# Patient Record
Sex: MALE | Race: WHITE | NOT HISPANIC OR LATINO | Employment: UNEMPLOYED | URBAN - NONMETROPOLITAN AREA
[De-identification: names, ages, dates, MRNs, and addresses within clinical notes are randomized per-mention and may not be internally consistent; named-entity substitution may affect disease eponyms.]

---

## 2020-09-08 ENCOUNTER — HOSPITAL ENCOUNTER (OUTPATIENT)
Dept: BONE DENSITY | Facility: HOSPITAL | Age: 70
Discharge: HOME | End: 2020-09-08
Admitting: INTERNAL MEDICINE

## 2020-09-08 DIAGNOSIS — Z91.89: ICD-10-CM

## 2020-09-08 PROCEDURE — 77080 DXA BONE DENSITY AXIAL: CPT

## 2020-09-08 PROCEDURE — 77080 DXA BONE DENSITY AXIAL: CPT | Performed by: RADIOLOGY

## 2023-02-21 ENCOUNTER — HOSPITAL ENCOUNTER (OUTPATIENT)
Dept: CT IMAGING | Facility: HOSPITAL | Age: 73
Discharge: HOME | End: 2023-02-21
Admitting: PHYSICIAN ASSISTANT
Payer: MEDICARE

## 2023-02-21 ENCOUNTER — TRANSCRIBE ORDERS (OUTPATIENT)
Dept: ADMINISTRATIVE | Facility: HOSPITAL | Age: 73
End: 2023-02-21
Payer: MEDICARE

## 2023-02-21 DIAGNOSIS — R31.0 GROSS HEMATURIA: ICD-10-CM

## 2023-02-21 DIAGNOSIS — R31.0 GROSS HEMATURIA: Primary | ICD-10-CM

## 2023-02-21 PROCEDURE — 74176 CT ABD & PELVIS W/O CONTRAST: CPT

## 2023-02-21 PROCEDURE — 74176 CT ABD & PELVIS W/O CONTRAST: CPT | Performed by: RADIOLOGY

## 2023-03-09 ENCOUNTER — HOSPITAL ENCOUNTER (OUTPATIENT)
Dept: GENERAL RADIOLOGY | Facility: HOSPITAL | Age: 73
Discharge: HOME | End: 2023-03-09
Payer: MEDICARE

## 2023-03-09 ENCOUNTER — OFFICE VISIT (OUTPATIENT)
Dept: UROLOGY | Facility: CLINIC | Age: 73
End: 2023-03-09
Payer: MEDICARE

## 2023-03-09 VITALS — HEIGHT: 66 IN | WEIGHT: 200 LBS | BODY MASS INDEX: 32.14 KG/M2

## 2023-03-09 DIAGNOSIS — N20.0 NEPHROLITHIASIS: ICD-10-CM

## 2023-03-09 DIAGNOSIS — N21.0 BLADDER CALCULI: ICD-10-CM

## 2023-03-09 DIAGNOSIS — K59.03 DRUG-INDUCED CONSTIPATION: ICD-10-CM

## 2023-03-09 DIAGNOSIS — R35.0 FREQUENCY OF URINATION: Primary | ICD-10-CM

## 2023-03-09 PROBLEM — E11.9 TYPE 2 DIABETES MELLITUS WITHOUT COMPLICATION: Status: ACTIVE | Noted: 2023-03-09

## 2023-03-09 PROBLEM — K59.09 CHRONIC CONSTIPATION: Status: ACTIVE | Noted: 2023-03-09

## 2023-03-09 PROBLEM — K80.20 CHOLELITHIASIS WITHOUT OBSTRUCTION: Status: ACTIVE | Noted: 2023-03-09

## 2023-03-09 PROBLEM — N40.0 BENIGN PROSTATIC HYPERPLASIA: Status: ACTIVE | Noted: 2023-03-09

## 2023-03-09 LAB
BILIRUB BLD-MCNC: NEGATIVE MG/DL
CLARITY, POC: CLEAR
COLOR UR: YELLOW
EXPIRATION DATE: ABNORMAL
GLUCOSE UR STRIP-MCNC: ABNORMAL MG/DL
KETONES UR QL: NEGATIVE
LEUKOCYTE EST, POC: NEGATIVE
Lab: ABNORMAL
NITRITE UR-MCNC: NEGATIVE MG/ML
PH UR: 5 [PH] (ref 5–8)
PROT UR STRIP-MCNC: ABNORMAL MG/DL
RBC # UR STRIP: NEGATIVE /UL
SP GR UR: 1.02 (ref 1–1.03)
UROBILINOGEN UR QL: NORMAL

## 2023-03-09 PROCEDURE — 1160F RVW MEDS BY RX/DR IN RCRD: CPT

## 2023-03-09 PROCEDURE — 81003 URINALYSIS AUTO W/O SCOPE: CPT

## 2023-03-09 PROCEDURE — 1159F MED LIST DOCD IN RCRD: CPT

## 2023-03-09 PROCEDURE — 74018 RADEX ABDOMEN 1 VIEW: CPT | Performed by: RADIOLOGY

## 2023-03-09 PROCEDURE — 99203 OFFICE O/P NEW LOW 30 MIN: CPT

## 2023-03-09 PROCEDURE — 74018 RADEX ABDOMEN 1 VIEW: CPT

## 2023-03-09 RX ORDER — MELATONIN
1 DAILY
COMMUNITY
Start: 2022-12-30

## 2023-03-09 RX ORDER — LISINOPRIL 20 MG/1
20 TABLET ORAL
COMMUNITY
Start: 2023-01-20

## 2023-03-09 RX ORDER — ROSUVASTATIN CALCIUM 10 MG/1
1 TABLET, COATED ORAL DAILY
COMMUNITY
Start: 2023-01-18

## 2023-03-09 RX ORDER — CARVEDILOL 3.12 MG/1
3.12 TABLET ORAL 2 TIMES DAILY WITH MEALS
COMMUNITY
Start: 2023-01-18 | End: 2023-03-09 | Stop reason: ALTCHOICE

## 2023-03-09 RX ORDER — LACTULOSE 10 G/15ML
20 SOLUTION ORAL 2 TIMES DAILY PRN
Qty: 237 ML | Refills: 1 | Status: SHIPPED | OUTPATIENT
Start: 2023-03-09

## 2023-03-09 RX ORDER — BUDESONIDE AND FORMOTEROL FUMARATE DIHYDRATE 160; 4.5 UG/1; UG/1
2 AEROSOL RESPIRATORY (INHALATION) 2 TIMES DAILY
COMMUNITY
Start: 2023-01-20

## 2023-03-09 RX ORDER — LATANOPROST 50 UG/ML
SOLUTION/ DROPS OPHTHALMIC
COMMUNITY
Start: 2023-01-20

## 2023-03-09 RX ORDER — MONTELUKAST SODIUM 10 MG/1
TABLET ORAL
COMMUNITY
Start: 2023-01-04

## 2023-03-09 RX ORDER — TAMSULOSIN HYDROCHLORIDE 0.4 MG/1
1 CAPSULE ORAL DAILY
Qty: 90 CAPSULE | Refills: 3 | Status: SHIPPED | OUTPATIENT
Start: 2023-03-09

## 2023-03-09 RX ORDER — MELOXICAM 15 MG/1
TABLET ORAL
COMMUNITY
Start: 2022-12-30

## 2023-03-09 RX ORDER — TIRZEPATIDE 2.5 MG/.5ML
INJECTION, SOLUTION SUBCUTANEOUS
COMMUNITY
Start: 2022-12-30

## 2023-03-09 RX ORDER — OMEPRAZOLE 20 MG/1
CAPSULE, DELAYED RELEASE ORAL
COMMUNITY
Start: 2022-12-05

## 2023-03-09 RX ORDER — LEVOCETIRIZINE DIHYDROCHLORIDE 5 MG/1
5 TABLET, FILM COATED ORAL
COMMUNITY
Start: 2022-12-27

## 2023-03-09 RX ORDER — TAMSULOSIN HYDROCHLORIDE 0.4 MG/1
CAPSULE ORAL
COMMUNITY
Start: 2023-02-21 | End: 2023-03-09 | Stop reason: SDUPTHER

## 2023-03-09 RX ORDER — ASPIRIN 81 MG/1
1 TABLET, COATED ORAL DAILY
COMMUNITY
Start: 2023-01-18

## 2023-03-09 NOTE — PROGRESS NOTES
"Chief Complaint:    Chief Complaint   Patient presents with   • Blood in Urine       Vital Signs:   Ht 167.6 cm (65.98\")   Wt 90.7 kg (200 lb)   BMI 32.30 kg/m²   Body mass index is 32.3 kg/m².      HPI:  Jamin Julio is a 72 y.o. male who presents today for initial evaluation     History of Present Illness  Mr. Julio presents the clinic today for initial evaluation of gross hematuria.  Patient has a past medical history significant for type 2 diabetes mellitus, BPH, hypertension, and nephrolithiasis.  States that he has passed multiple stones previously.  He has never had to have surgery for stone removal. He was really seen by primary care provider who had completed a culture that showed no significant growth.  They then ordered a CT scan that showed A Significant Amount of calcification throughout the urinary tract system.  The patient had multiple nonobstructing stones in the left renal collecting system with the largest measuring roughly 9 mm.  He had multiple nonobstructing stones in the right kidney with the largest measuring roughly 2 to 3 mm.  He did have a proximal right ureteral stone measuring 6 mm that was causing no significant obstruction at the time of the study.  He also had 2 left-sided bladder calculi measuring roughly 4 to 5 mm apiece. Denies any significant pain at this time.  I repeated KUB today in office for further evaluation of his right ureteral stone however it was not well identified on x-ray today.  He has significant right-sided stool burden with calculus identified in the left kidney and lower left pelvis.  UA today showed no blood or signs infection.  He does currently take Flomax daily for BPH.      Past Medical History:  Past Medical History:   Diagnosis Date   • Diabetes mellitus (HCC)    • Hypertension    • Kidney stone        Current Meds:  Current Outpatient Medications   Medication Sig Dispense Refill   • metoprolol tartrate (LOPRESSOR) 25 MG tablet Take  by mouth.     • " tamsulosin (FLOMAX) 0.4 MG capsule 24 hr capsule Take 1 capsule by mouth Daily. 90 capsule 3   • Aspirin Low Dose 81 MG EC tablet Take 1 tablet by mouth Daily.     • budesonide-formoterol (SYMBICORT) 160-4.5 MCG/ACT inhaler Inhale 2 puffs 2 (Two) Times a Day. Rinse mouth after each use     • cholecalciferol (VITAMIN D3) 25 MCG (1000 UT) tablet Take 1 tablet by mouth Daily.     • lactulose (CHRONULAC) 10 GM/15ML solution Take 30 mL by mouth 2 (Two) Times a Day As Needed (for constipation). 237 mL 1   • latanoprost (XALATAN) 0.005 % ophthalmic solution INSTILL 1 DROP IN BOTH EYES EVERY DAY IN THE EVENING     • levocetirizine (XYZAL) 5 MG tablet Take 1 tablet by mouth every night at bedtime.     • lisinopril (PRINIVIL,ZESTRIL) 20 MG tablet Take 1 tablet by mouth every night at bedtime.     • meloxicam (MOBIC) 15 MG tablet      • metFORMIN (GLUCOPHAGE) 500 MG tablet take 1 tablet by mouth twice daily with the morning and evening meal     • montelukast (SINGULAIR) 10 MG tablet      • Mounjaro 2.5 MG/0.5ML solution pen-injector      • omeprazole (priLOSEC) 20 MG capsule TAKE 1 CAPSULE BY MOUTH EVERY DAY 30 MINUTES TO 1 HOUR BEFORE A MEAL     • rosuvastatin (CRESTOR) 10 MG tablet Take 1 tablet by mouth Daily.       No current facility-administered medications for this visit.        Allergies:   No Known Allergies     Past Surgical History:  History reviewed. No pertinent surgical history.    Social History:  Social History     Socioeconomic History   • Marital status:    Tobacco Use   • Smoking status: Never     Passive exposure: Never   • Smokeless tobacco: Current     Types: Snuff   Vaping Use   • Vaping Use: Never used   Substance and Sexual Activity   • Alcohol use: Never   • Drug use: Never   • Sexual activity: Defer       Family History:  Family History   Problem Relation Age of Onset   • Prostate cancer Father        Review of Systems:  Review of Systems   Constitutional: Negative for fatigue, fever and  unexpected weight change.   Respiratory: Negative for chest tightness and shortness of breath.    Cardiovascular: Negative for chest pain.   Gastrointestinal: Negative for abdominal pain, constipation, diarrhea, nausea and vomiting.   Genitourinary: Positive for flank pain and hematuria. Negative for decreased urine volume, difficulty urinating, dysuria, enuresis, frequency, genital sores, penile discharge, penile pain, penile swelling, scrotal swelling, testicular pain and urgency.   Musculoskeletal: Positive for back pain.   Skin: Negative.  Negative for rash.   Psychiatric/Behavioral: Negative for confusion and suicidal ideas.       Physical Exam:  Physical Exam  Constitutional:       General: He is not in acute distress.     Appearance: Normal appearance.   HENT:      Head: Normocephalic and atraumatic.      Nose: Nose normal.      Mouth/Throat:      Mouth: Mucous membranes are moist.   Eyes:      Conjunctiva/sclera: Conjunctivae normal.   Cardiovascular:      Rate and Rhythm: Normal rate and regular rhythm.      Pulses: Normal pulses.      Heart sounds: Normal heart sounds.   Pulmonary:      Effort: Pulmonary effort is normal.      Breath sounds: Normal breath sounds.   Abdominal:      General: Bowel sounds are normal.      Palpations: Abdomen is soft.      Tenderness: There is no right CVA tenderness or left CVA tenderness.   Musculoskeletal:         General: Normal range of motion.      Cervical back: Normal range of motion.   Skin:     General: Skin is warm.   Neurological:      General: No focal deficit present.      Mental Status: He is alert and oriented to person, place, and time.   Psychiatric:         Mood and Affect: Mood normal.         Behavior: Behavior normal.         Thought Content: Thought content normal.         Judgment: Judgment normal.         IPSS Questionnaire (AUA-7):  IPSS Questionnaire (AUA-7):                  IPSS Questionnaire (AUA-7):  Over the past month…    1)  Incomplete  Emptying  How often have you had a sensation of not emptying your bladder?  0 - Not at all   2)  Frequency  How often have you had to urinate less than every two hours? 0 - Not at all   3)  Intermittency  How often have you found you stopped and started again several times when you urinated?  0 - Not at all   4) Urgency  How often have you found it difficult to postpone urination?  0 - Not at all   5) Weak Stream  How often have you had a weak urinary stream?  2 - Less than half the time   6) Straining  How often have you had to push or strain to begin urination?  0 - Not at all   7) Nocturia  How many times did you typically get up at night to urinate?  1 - 1 time   Total Score:  3   The International Prostate Symptom Score (IPSS) is used to screen, diagnose, track symptoms of benign prostatic hyperplasia (BPH).    0-7 pts (Mild Symptoms)  / 8-19 pts (Moderate) / 20-35 (Severe)    Quality of life due to urinary symptoms:  If you were to spend the rest of your life with your urinary condition the way it is now, how would you feel about that? 1-Pleased   Urine Leakage (Incontinence) 0-No Leakage       Recent Image (CT and/or KUB):   CT Abdomen and Pelvis: No results found for this or any previous visit.     CT Stone Protocol: Results for orders placed during the hospital encounter of 02/21/23    CT Abdomen Pelvis Stone Protocol    Narrative  EXAM:  CT Abdomen and Pelvis Without Intravenous Contrast    EXAM DATE:  2/21/2023 11:23 AM    CLINICAL HISTORY:  R31.0; R31.0-Gross hematuria    TECHNIQUE:  Axial computed tomography images of the abdomen and pelvis without  intravenous contrast.  Sagittal and coronal reformatted images were  created and reviewed.  This CT exam was performed using one or more of  the following dose reduction techniques:  automated exposure control,  adjustment of the mA and/or kV according to patient size, and/or use of  iterative reconstruction technique.    COMPARISON:  No relevant prior  studies available.    FINDINGS:  Lung bases:  Lung bases are clear.  Heart:  Cardiomegaly.    ABDOMEN:  Liver:  Unremarkable.  Gallbladder and bile ducts:  Marked distention of the gallbladder with  layering stones. Correlate with ultrasound.  No ductal dilation.  Pancreas:  Unremarkable.  No ductal dilation.  Spleen:  Unremarkable.  No splenomegaly.  Adrenals:  Unremarkable.  No mass.  Kidneys and ureters:  Bilateral nonobstructing left greater than right  kidney stones.  6 mm right UPJ stone causing no significant  hydronephrosis.  No additional ureteral stones identified. No ureteral  dilatation.  Stomach and bowel:  Moderate constipation is noted. No bowel  obstruction identified.  Sigmoid diverticulosis without evidence of  acute diverticulitis.  Duodenal diverticulum incidentally noted in a  characteristic location.    PELVIS:  Appendix:  Normal appendix.  Bladder:  Layering stones within the urinary bladder.  Reproductive:  Prostate enlargement with Central calcifications.    ABDOMEN and PELVIS:  Intraperitoneal space:  No pneumoperitoneum identified.  No ascites or  abscess.  Bones/joints:  Degenerative changes lumbar spine.  No acute bony  findings.  No dislocation.  Soft tissues:  Small FAT only containing umbilical hernia.  Vasculature:  Unremarkable.  No abdominal aortic aneurysm.  Lymph nodes:  Unremarkable.  No enlarged lymph nodes.    Impression  1.  Bilateral nonobstructing kidney stones, left greater than right.  2.  6 mm right UPJ stone causing no significant hydronephrosis.  3.  Layering stones within the urinary bladder dependent portion.  4.  Prostate enlargement.  5.  Marked gallbladder distention with layering stones. Correlate with  ultrasound and clinical history.  6.  Moderate constipation. Diverticulosis. No diverticulitis.  7.  Other incidental and nonacute findings detailed above.    This report was finalized on 2/21/2023 12:13 PM by Dr. Eric Stokes MD.     KUB: No results found for  this or any previous visit.       Labs:  Brief Urine Lab Results  (Last result in the past 365 days)      Color   Clarity   Blood   Leuk Est   Nitrite   Protein   CREAT   Urine HCG        03/09/23 1359 Yellow   Clear   Negative   Negative   Negative   3+               Office Visit on 03/09/2023   Component Date Value Ref Range Status   • Color 03/09/2023 Yellow  Yellow, Straw, Dark Yellow, Jaqueline Final   • Clarity, UA 03/09/2023 Clear  Clear Final   • Specific Gravity  03/09/2023 1.025  1.005 - 1.030 Final   • pH, Urine 03/09/2023 5.0  5.0 - 8.0 Final   • Leukocytes 03/09/2023 Negative  Negative Final   • Nitrite, UA 03/09/2023 Negative  Negative Final   • Protein, POC 03/09/2023 3+ (A)  Negative mg/dL Final   • Glucose, UA 03/09/2023 2+ (A)  Negative mg/dL Final   • Ketones, UA 03/09/2023 Negative  Negative Final   • Urobilinogen, UA 03/09/2023 Normal  Normal, 0.2 E.U./dL Final   • Bilirubin 03/09/2023 Negative  Negative Final   • Blood, UA 03/09/2023 Negative  Negative Final   • Lot Number 03/09/2023 n   Final   • Expiration Date 03/09/2023 n   Final        Procedure: None  Procedures     I have reviewed and agree with the above PMH, PSH, FMH, social history, medications, allergies, and labs.     Assessment/Plan:   Problem List Items Addressed This Visit        Genitourinary and Reproductive     Nephrolithiasis    Relevant Medications    tamsulosin (FLOMAX) 0.4 MG capsule 24 hr capsule    Other Relevant Orders    XR abdomen kub    Bladder calculi    Relevant Medications    tamsulosin (FLOMAX) 0.4 MG capsule 24 hr capsule   Other Visit Diagnoses     Frequency of urination    -  Primary    Relevant Orders    POC Urinalysis Dipstick, Automated (Completed)    Drug-induced constipation        Relevant Medications    lactulose (CHRONULAC) 10 GM/15ML solution          Health Maintenance:   Health Maintenance Due   Topic Date Due   • URINE MICROALBUMIN  Never done   • COLORECTAL CANCER SCREENING  Never done   • COVID-19  Vaccine (1) Never done   • Pneumococcal Vaccine 65+ (1 - PCV) Never done   • ZOSTER VACCINE (1 of 2) Never done   • TDAP/TD VACCINES (2 - Tdap) 10/12/2009   • HEPATITIS C SCREENING  Never done   • DIABETIC FOOT EXAM  Never done        Smoking Counseling: Never smoked.  Current user smokes tobacco.  Counseling at [facility] this time.    Urine Incontinence: Patient reports that he is not currently experiencing any symptoms of urinary incontinence.    Patient was given instructions and counseling regarding his condition or for health maintenance advice. Please see specific information pulled into the AVS if appropriate.    Patient Education:   Renal calculus - It was discussed with the patient the presence of multiple nonobstructing intrarenal calculi and one 6 mm right proximal ureteral stone with no obstruction.  He also had 2 bladder calculi. We discussed the various therapeutic options available including percutaneous nephrostolithotomy, ureteroscopy and extracorporeal shockwave  lithotripsy.  We discussed the risks of lithotripsy including the passage of stones leading to a 3% chance of Steinstrasse or a large string of stones in the distal ureter. In this incidence the patient was informed that a ureteroscopy is indicated for obstructing fragments.  Patient was informed of an extremely rare incidence of renal hematoma and the significance of this.  Patient was educated on percutaneous nephrostolithotomy and its use as well as the risks and benefits such as the need for postoperative hospitalization, and the risk of damage to the kidney and the remote risk of a nephrectomy.  We also discussed the use of ureteroscopy in the upper tracts and its decreased success rate to completely remove the stones likely causing stent placement leading to an additional procedure for removal.  We discussed the absolute relative indicators for intervention including the presence of sepsis and uncontrollable pain leading to need for  urgent intervention.  We discussed placement of a stent if indicated and the management of the stent as well.  Given that the patient has no current symptoms at this time and UA shows no blood I am recommending to increase Flomax to twice daily.  Discussed the risks and side effects of this.  Advised patient to decrease to once daily if he begins to experience lightheadedness, dizziness, blurry vision, shortness of breath, chest pain, or syncope.  Also educated patient to increase water intake to 2 to 3 L/day.  Advised patient to decrease the intake of food and drinks that contribute to stone formation such as but not limited to pop, tea, dairy products, red meat, or supplements.  Chronic constipation -on KUB today patient has significant constipation noted with internal renal and bladder calculi.  This time recommending a trial of lactulose as needed twice daily for constipation.  Advised him to increase water intake to 3 L/day.  Advised patient to take a stool softener over-the-counter daily.  I would like to follow-up with him in 1 month for reevaluation of symptoms and discussion of further care.  Patient verbalized understanding agrees.    Visit Diagnoses:    ICD-10-CM ICD-9-CM   1. Frequency of urination  R35.0 788.41   2. Nephrolithiasis  N20.0 592.0   3. Bladder calculi  N21.0 594.1   4. Drug-induced constipation  K59.03 564.09     E980.5       Meds Ordered During Visit:  New Medications Ordered This Visit   Medications   • lactulose (CHRONULAC) 10 GM/15ML solution     Sig: Take 30 mL by mouth 2 (Two) Times a Day As Needed (for constipation).     Dispense:  237 mL     Refill:  1   • tamsulosin (FLOMAX) 0.4 MG capsule 24 hr capsule     Sig: Take 1 capsule by mouth Daily.     Dispense:  90 capsule     Refill:  3       Follow Up Appointment: 1 month  No follow-ups on file.      This document has been electronically signed by Ag Bautista PA-C   March 9, 2023 18:55 EST    Part of this note may be an  electronic transcription/translation of spoken language to printed text using the Dragon Dictation System.

## 2023-04-13 ENCOUNTER — OFFICE VISIT (OUTPATIENT)
Dept: UROLOGY | Facility: CLINIC | Age: 73
End: 2023-04-13
Payer: MEDICARE

## 2023-04-13 VITALS
WEIGHT: 200 LBS | DIASTOLIC BLOOD PRESSURE: 83 MMHG | HEART RATE: 84 BPM | HEIGHT: 66 IN | SYSTOLIC BLOOD PRESSURE: 168 MMHG | BODY MASS INDEX: 32.14 KG/M2

## 2023-04-13 DIAGNOSIS — N40.1 BENIGN PROSTATIC HYPERPLASIA WITH URINARY FREQUENCY: ICD-10-CM

## 2023-04-13 DIAGNOSIS — K59.09 CHRONIC CONSTIPATION: Primary | ICD-10-CM

## 2023-04-13 DIAGNOSIS — R97.20 ELEVATED PROSTATE SPECIFIC ANTIGEN (PSA): ICD-10-CM

## 2023-04-13 DIAGNOSIS — R35.0 BENIGN PROSTATIC HYPERPLASIA WITH URINARY FREQUENCY: ICD-10-CM

## 2023-04-13 PROCEDURE — 51798 US URINE CAPACITY MEASURE: CPT

## 2023-04-13 PROCEDURE — 1159F MED LIST DOCD IN RCRD: CPT

## 2023-04-13 PROCEDURE — 99214 OFFICE O/P EST MOD 30 MIN: CPT

## 2023-04-13 PROCEDURE — 1160F RVW MEDS BY RX/DR IN RCRD: CPT

## 2023-04-13 NOTE — PROGRESS NOTES
"Chief Complaint:    Chief Complaint   Patient presents with   • Frequency of urination     1 month follow up       Vital Signs:   /83 (BP Location: Right arm, Patient Position: Sitting)   Pulse 84   Ht 167.6 cm (65.98\")   Wt 90.7 kg (200 lb)   BMI 32.30 kg/m²   Body mass index is 32.3 kg/m².      HPI:  Jamin Julio is a 72 y.o. male who presents today for follow up    History of Present Illness  Mr. Julio presents to the clinic for follow-up for nephrolithiasis and urinary frequency.  Patient has had a CT and KUB completed recently that showed significant stone burden throughout his renal collecting system.  He had also stones located inside the bladder.  Patient has passed multiple stones since last office visit.  States that he is doing much better.  I started the patient on trial of Flomax and Myrbetriq 25 mg once nightly.  On bladder scan today he only has roughly 55 mL in his bladder.  I did do a bladder ultrasound as well and I did not see any evidence of bladder stones.  He has tried over-the-counter medications for constipation with no improvement in symptoms.  He also had a recent PSA completed on 3/31/2023 by his primary care provider that came back high at roughly 5.5.  He denies any history of elevated PSA in the past.  Still has some frequency and urgency.  He gets up only roughly 2 times throughout the night.  Digital rectal exam reveals slight enlargement of prostate with no other abnormalities.      Past Medical History:  Past Medical History:   Diagnosis Date   • Diabetes mellitus    • Hypertension    • Kidney stone        Current Meds:  Current Outpatient Medications   Medication Sig Dispense Refill   • Aspirin Low Dose 81 MG EC tablet Take 1 tablet by mouth Daily.     • budesonide-formoterol (SYMBICORT) 160-4.5 MCG/ACT inhaler Inhale 2 puffs 2 (Two) Times a Day. Rinse mouth after each use     • cholecalciferol (VITAMIN D3) 25 MCG (1000 UT) tablet Take 1 tablet by mouth Daily.     • " lactulose (CHRONULAC) 10 GM/15ML solution Take 30 mL by mouth 2 (Two) Times a Day As Needed (for constipation). 237 mL 1   • latanoprost (XALATAN) 0.005 % ophthalmic solution INSTILL 1 DROP IN BOTH EYES EVERY DAY IN THE EVENING     • levocetirizine (XYZAL) 5 MG tablet Take 1 tablet by mouth every night at bedtime.     • lisinopril (PRINIVIL,ZESTRIL) 20 MG tablet Take 1 tablet by mouth every night at bedtime.     • meloxicam (MOBIC) 15 MG tablet      • metFORMIN (GLUCOPHAGE) 500 MG tablet take 1 tablet by mouth twice daily with the morning and evening meal     • metoprolol tartrate (LOPRESSOR) 25 MG tablet Take  by mouth.     • montelukast (SINGULAIR) 10 MG tablet      • Mounjaro 2.5 MG/0.5ML solution pen-injector      • omeprazole (priLOSEC) 20 MG capsule TAKE 1 CAPSULE BY MOUTH EVERY DAY 30 MINUTES TO 1 HOUR BEFORE A MEAL     • rosuvastatin (CRESTOR) 10 MG tablet Take 1 tablet by mouth Daily.     • tamsulosin (FLOMAX) 0.4 MG capsule 24 hr capsule Take 1 capsule by mouth Daily. 90 capsule 3   • linaclotide (LINZESS) 145 MCG capsule capsule Take 1 capsule by mouth Every Morning Before Breakfast. 30 capsule 2   • Mirabegron ER (Myrbetriq) 25 MG tablet sustained-release 24 hour 24 hr tablet Take 1 tablet by mouth Daily. 30 tablet 1     No current facility-administered medications for this visit.        Allergies:   No Known Allergies     Past Surgical History:  History reviewed. No pertinent surgical history.    Social History:  Social History     Socioeconomic History   • Marital status:    Tobacco Use   • Smoking status: Never     Passive exposure: Never   • Smokeless tobacco: Current     Types: Snuff   Vaping Use   • Vaping Use: Never used   Substance and Sexual Activity   • Alcohol use: Never   • Drug use: Never   • Sexual activity: Defer       Family History:  Family History   Problem Relation Age of Onset   • Prostate cancer Father        Review of Systems:  Review of Systems   Constitutional: Negative for  fatigue, fever and unexpected weight change.   Respiratory: Negative for chest tightness and shortness of breath.    Cardiovascular: Negative for chest pain.   Gastrointestinal: Negative for abdominal pain, constipation, diarrhea, nausea and vomiting.   Genitourinary: Positive for flank pain, frequency and urgency. Negative for decreased urine volume, difficulty urinating, dysuria, enuresis, genital sores, hematuria, penile discharge, penile pain, penile swelling, scrotal swelling and testicular pain.   Musculoskeletal: Positive for back pain.   Skin: Negative.  Negative for rash.   Psychiatric/Behavioral: Negative for confusion and suicidal ideas.       Physical Exam:  Physical Exam  Constitutional:       General: He is not in acute distress.     Appearance: Normal appearance.   HENT:      Head: Normocephalic and atraumatic.      Nose: Nose normal.      Mouth/Throat:      Mouth: Mucous membranes are moist.   Eyes:      Conjunctiva/sclera: Conjunctivae normal.   Cardiovascular:      Rate and Rhythm: Normal rate and regular rhythm.      Pulses: Normal pulses.      Heart sounds: Normal heart sounds.   Pulmonary:      Effort: Pulmonary effort is normal.      Breath sounds: Normal breath sounds.   Abdominal:      General: Bowel sounds are normal.      Palpations: Abdomen is soft.      Tenderness: There is no right CVA tenderness or left CVA tenderness.   Genitourinary:     Prostate: Normal.      Comments: Enlargement of prostate  Musculoskeletal:         General: Normal range of motion.      Cervical back: Normal range of motion.   Skin:     General: Skin is warm.   Neurological:      General: No focal deficit present.      Mental Status: He is alert and oriented to person, place, and time.   Psychiatric:         Mood and Affect: Mood normal.         Behavior: Behavior normal.         Thought Content: Thought content normal.         Judgment: Judgment normal.       Recent Image (CT and/or KUB):   CT Abdomen and Pelvis:  No results found for this or any previous visit.     CT Stone Protocol: Results for orders placed during the hospital encounter of 02/21/23    CT Abdomen Pelvis Stone Protocol    Narrative  EXAM:  CT Abdomen and Pelvis Without Intravenous Contrast    EXAM DATE:  2/21/2023 11:23 AM    CLINICAL HISTORY:  R31.0; R31.0-Gross hematuria    TECHNIQUE:  Axial computed tomography images of the abdomen and pelvis without  intravenous contrast.  Sagittal and coronal reformatted images were  created and reviewed.  This CT exam was performed using one or more of  the following dose reduction techniques:  automated exposure control,  adjustment of the mA and/or kV according to patient size, and/or use of  iterative reconstruction technique.    COMPARISON:  No relevant prior studies available.    FINDINGS:  Lung bases:  Lung bases are clear.  Heart:  Cardiomegaly.    ABDOMEN:  Liver:  Unremarkable.  Gallbladder and bile ducts:  Marked distention of the gallbladder with  layering stones. Correlate with ultrasound.  No ductal dilation.  Pancreas:  Unremarkable.  No ductal dilation.  Spleen:  Unremarkable.  No splenomegaly.  Adrenals:  Unremarkable.  No mass.  Kidneys and ureters:  Bilateral nonobstructing left greater than right  kidney stones.  6 mm right UPJ stone causing no significant  hydronephrosis.  No additional ureteral stones identified. No ureteral  dilatation.  Stomach and bowel:  Moderate constipation is noted. No bowel  obstruction identified.  Sigmoid diverticulosis without evidence of  acute diverticulitis.  Duodenal diverticulum incidentally noted in a  characteristic location.    PELVIS:  Appendix:  Normal appendix.  Bladder:  Layering stones within the urinary bladder.  Reproductive:  Prostate enlargement with Central calcifications.    ABDOMEN and PELVIS:  Intraperitoneal space:  No pneumoperitoneum identified.  No ascites or  abscess.  Bones/joints:  Degenerative changes lumbar spine.  No acute bony  findings.   No dislocation.  Soft tissues:  Small FAT only containing umbilical hernia.  Vasculature:  Unremarkable.  No abdominal aortic aneurysm.  Lymph nodes:  Unremarkable.  No enlarged lymph nodes.    Impression  1.  Bilateral nonobstructing kidney stones, left greater than right.  2.  6 mm right UPJ stone causing no significant hydronephrosis.  3.  Layering stones within the urinary bladder dependent portion.  4.  Prostate enlargement.  5.  Marked gallbladder distention with layering stones. Correlate with  ultrasound and clinical history.  6.  Moderate constipation. Diverticulosis. No diverticulitis.  7.  Other incidental and nonacute findings detailed above.    This report was finalized on 2/21/2023 12:13 PM by Dr. Eric Stokes MD.     KUB: Results for orders placed in visit on 03/09/23    XR abdomen kub    Narrative  EXAM:  XR Abdomen, 1 View    EXAM DATE:  3/9/2023 2:22 PM    CLINICAL HISTORY:  nephrolithiasis; N20.0-Calculus of kidney    TECHNIQUE:  Frontal supine view of the abdomen/pelvis.    COMPARISON:  CT performed 02/21/2023    FINDINGS:  GASTROINTESTINAL TRACT:  Abundant stool throughout the colon.  No  dilation.  ORGANS:  Known left renal calculi are again noted.  BONES/JOINTS:  Unremarkable.  VASCULATURE:  Pelvic calcifications, some of which are vascular and  others may be within the urinary bladder are again noted.    Impression  1.  Known left renal calculi are again noted.  2.  Pelvic calcifications, some of which are vascular and others may be  within the urinary bladder are again noted.  3.  Abundant stool throughout the colon.    This report was finalized on 3/10/2023 8:35 AM by Dr. Fernando Wise MD.       Labs:  Brief Urine Lab Results  (Last result in the past 365 days)      Color   Clarity   Blood   Leuk Est   Nitrite   Protein   CREAT   Urine HCG        03/09/23 1359 Yellow   Clear   Negative   Negative   Negative   3+               Office Visit on 03/09/2023   Component Date Value Ref Range  Status   • Color 03/09/2023 Yellow  Yellow, Straw, Dark Yellow, Jaqueline Final   • Clarity, UA 03/09/2023 Clear  Clear Final   • Specific Gravity  03/09/2023 1.025  1.005 - 1.030 Final   • pH, Urine 03/09/2023 5.0  5.0 - 8.0 Final   • Leukocytes 03/09/2023 Negative  Negative Final   • Nitrite, UA 03/09/2023 Negative  Negative Final   • Protein, POC 03/09/2023 3+ (A)  Negative mg/dL Final   • Glucose, UA 03/09/2023 2+ (A)  Negative mg/dL Final   • Ketones, UA 03/09/2023 Negative  Negative Final   • Urobilinogen, UA 03/09/2023 Normal  Normal, 0.2 E.U./dL Final   • Bilirubin 03/09/2023 Negative  Negative Final   • Blood, UA 03/09/2023 Negative  Negative Final   • Lot Number 03/09/2023 n   Final   • Expiration Date 03/09/2023 n   Final        Procedure: None  Procedures     I have reviewed and agree with the above PMH, PSH, FMH, social history, medications, allergies, and labs.      Assessment/Plan:   Problem List Items Addressed This Visit        Gastrointestinal Abdominal     Chronic constipation - Primary    Relevant Medications    linaclotide (LINZESS) 145 MCG capsule capsule       Genitourinary and Reproductive     Benign prostatic hyperplasia    Relevant Medications    Mirabegron ER (Myrbetriq) 25 MG tablet sustained-release 24 hour 24 hr tablet    Other Relevant Orders    PSA Total+% Free (Serial)    Elevated prostate specific antigen (PSA)    Relevant Orders    PSA Total+% Free (Serial)       Health Maintenance:   Health Maintenance Due   Topic Date Due   • URINE MICROALBUMIN  Never done   • COLORECTAL CANCER SCREENING  Never done   • COVID-19 Vaccine (1) Never done   • Pneumococcal Vaccine 65+ (1 - PCV) Never done   • ZOSTER VACCINE (1 of 2) Never done   • TDAP/TD VACCINES (2 - Tdap) 10/12/2009   • HEPATITIS C SCREENING  Never done   • DIABETIC FOOT EXAM  Never done        Smoking Counseling: Never smoked.  Current user smokeless tobacco.  Counseling given however not ready to quit at this time.    Urine  Incontinence: Patient reports that he is not currently experiencing any symptoms of urinary incontinence.    Patient was given instructions and counseling regarding his condition or for health maintenance advice. Please see specific information pulled into the AVS if appropriate.    Patient Education:   Chronic constipation -patient has a history of chronic constipation has tried over-the-counter medications with no improvement of symptoms.  I also sent in lactulose for him and he states that has not helped.  At this time I am recommending to start Linzess 145 mg once daily.  I will send this into his pharmacy to .  Advised him to continue to increase water intake to 2 to 3 L/day.  Nephrolithiasis/bladder stones -patient has multiple kidney stones throughout both renal collecting systems as well as bladder stones that was identified on CT and KUB.  Patient states he has passed several stones since last office visit.  On bladder scan today he has only roughly 55 mL in his bladder.  There are no stones identified on ultrasound of his bladder.  Recommending to continue Flomax.  Elevated PSA/Benign Prostate Hypertrophy (BPH): I discussed the pathophysiology of BPH and obstruction.  We discussed the static and dynamic effects of BPH as well as using 5 alpha reductase inhibitors versus alpha blockade.  We discussed the indications for transurethral surgery as well and/ or other therapeutic options available including all of the newer techniques.  Patient has had improvement in urinary symptoms with Flomax.  We will continue this once nightly.  I also like to continue Myrbetriq 25 mg nightly.  Advised the risk and benefits of this medication.  PSA testing -patient's most recent PSA taken at the end of March came back high at 5.5.  I recommended repeat PSA free and total in office. I discussed the pathophysiology of PSA testing indicating its use in the diagnosis and management of prostate cancer.  I discussed the  normal range being 0 to 4, but more appropriately being much closer to 0 to 2 in a normal male.  I discussed the fact that after a certain age it is against recommendation to use PSA testing especially in view of numerous comorbidities.  I discussed many of the things that can artificially raise PSA including put not limited to a recent infection, urinary tract infection, recent sexual intercourse, or even the type of movement such as manipulation of the prostate from riding a bicycle.  It was discussed that the most important use of PSA is the velocity measurement.  This refers to the change of PSA with time. I discussed that we look for greater than 20% rise over a year to help us make the prediction of prostate cancer.  I also discussed that in the case of prostate cancer indicating a radical prostatectomy, the PSA should be 0 and any rise indicates an early biochemical recurrence.  I will call patient with PSA results.  Otherwise I will see him back in 3 months for reevaluation.  Patient verbalized understanding agrees to plan of care.    Visit Diagnoses:    ICD-10-CM ICD-9-CM   1. Chronic constipation  K59.09 564.00   2. Elevated prostate specific antigen (PSA)  R97.20 790.93   3. Benign prostatic hyperplasia with urinary frequency  N40.1 600.01    R35.0 788.41       Meds Ordered During Visit:  New Medications Ordered This Visit   Medications   • linaclotide (LINZESS) 145 MCG capsule capsule     Sig: Take 1 capsule by mouth Every Morning Before Breakfast.     Dispense:  30 capsule     Refill:  2   • Mirabegron ER (Myrbetriq) 25 MG tablet sustained-release 24 hour 24 hr tablet     Sig: Take 1 tablet by mouth Daily.     Dispense:  30 tablet     Refill:  1       Follow Up Appointment: 3 months  No follow-ups on file.      This document has been electronically signed by Ag Bautista PA-C   April 14, 2023 09:50 EDT    Part of this note may be an electronic transcription/translation of spoken language to printed  text using the Dragon Dictation System.

## 2023-04-14 PROBLEM — R97.20 ELEVATED PROSTATE SPECIFIC ANTIGEN (PSA): Status: ACTIVE | Noted: 2023-04-14

## 2023-04-14 LAB
PSA FREE MFR SERPL: 11.3 %
PSA FREE SERPL-MCNC: 0.71 NG/ML
PSA SERPL-MCNC: 6.3 NG/ML (ref 0–4)

## 2023-04-17 ENCOUNTER — TELEPHONE (OUTPATIENT)
Dept: UROLOGY | Facility: CLINIC | Age: 73
End: 2023-04-17
Payer: MEDICARE

## 2023-04-17 NOTE — TELEPHONE ENCOUNTER
Called patient back about PSA results. I discuss MRI vs. Biopsy. Patient would like to carefully watch at this time. I will see him for her follow up visit in 3 months.

## 2024-01-19 ENCOUNTER — OFFICE VISIT (OUTPATIENT)
Dept: UROLOGY | Facility: CLINIC | Age: 74
End: 2024-01-19
Payer: MEDICARE

## 2024-01-19 VITALS
DIASTOLIC BLOOD PRESSURE: 78 MMHG | HEIGHT: 66 IN | SYSTOLIC BLOOD PRESSURE: 184 MMHG | BODY MASS INDEX: 25.3 KG/M2 | WEIGHT: 157.4 LBS | HEART RATE: 73 BPM

## 2024-01-19 DIAGNOSIS — N40.1 BENIGN PROSTATIC HYPERPLASIA WITH NOCTURIA: ICD-10-CM

## 2024-01-19 DIAGNOSIS — N20.0 NEPHROLITHIASIS: ICD-10-CM

## 2024-01-19 DIAGNOSIS — R97.20 ELEVATED PROSTATE SPECIFIC ANTIGEN (PSA): Primary | ICD-10-CM

## 2024-01-19 DIAGNOSIS — R35.1 BENIGN PROSTATIC HYPERPLASIA WITH NOCTURIA: ICD-10-CM

## 2024-01-19 PROCEDURE — 84154 ASSAY OF PSA FREE: CPT

## 2024-01-19 PROCEDURE — 84153 ASSAY OF PSA TOTAL: CPT

## 2024-01-19 RX ORDER — ALBUTEROL SULFATE 90 UG/1
2 AEROSOL, METERED RESPIRATORY (INHALATION) EVERY 4 HOURS PRN
COMMUNITY
Start: 2023-12-06

## 2024-01-19 RX ORDER — GLIMEPIRIDE 1 MG/1
1 TABLET ORAL
COMMUNITY
Start: 2023-12-28

## 2024-01-19 NOTE — PROGRESS NOTES
"Chief Complaint:    Chief Complaint   Patient presents with    Urinary Frequency       Vital Signs:   BP (!) 184/78 (BP Location: Right arm, Patient Position: Sitting)   Pulse 73   Ht 167.6 cm (65.98\")   Wt 71.4 kg (157 lb 6.4 oz)   BMI 25.42 kg/m²   Body mass index is 25.42 kg/m².      HPI:  Jamin Julio is a 73 y.o. male who presents today for follow up    History of Present Illness  Mr. Julio presents to the clinic for a 6-month follow-up for nephrolithiasis, BPH with nocturia, and elevated PSA.  Patient was started Myrbetriq 25 mg at last office visit has been doing well.  States he did pass 1 kidney stone since his last office visit but denies any problems since.  He currently has an IPSS score of 2.  He states he gets up roughly 1-2 times throughout the night but denies any straining to begin with urination, weak stream, urgency, intermittency, frequency, or sensation of incomplete bladder emptying.  He continues on Flomax once daily.  Did have a PSA taken roughly 10 months ago that came back high at roughly 6.3 with 11% free percentage.  He had a PSA followed up by his primary care provider a few months later that decreased to 5.9.  I recommend a repeat PSA in office today.  Will give the patient some more samples of Myrbetriq.      Past Medical History:  Past Medical History:   Diagnosis Date    Diabetes mellitus     Hypertension     Kidney stone        Current Meds:  Current Outpatient Medications   Medication Sig Dispense Refill    albuterol sulfate  (90 Base) MCG/ACT inhaler Inhale 2 puffs Every 4 (Four) Hours As Needed for Shortness of Air.      Aspirin Low Dose 81 MG EC tablet Take 1 tablet by mouth Daily.      budesonide-formoterol (SYMBICORT) 160-4.5 MCG/ACT inhaler Inhale 2 puffs 2 (Two) Times a Day. Rinse mouth after each use      carvedilol (COREG) 3.125 MG tablet Take 1 tablet by mouth 2 (Two) Times a Day With Meals.      cholecalciferol (VITAMIN D3) 25 MCG (1000 UT) tablet Take 1 " tablet by mouth Daily.      glimepiride (AMARYL) 1 MG tablet 1 tablet.      Januvia 100 MG tablet 1 tablet.      lactulose (CHRONULAC) 10 GM/15ML solution Take 30 mL by mouth 2 (Two) Times a Day As Needed (for constipation). 237 mL 1    latanoprost (XALATAN) 0.005 % ophthalmic solution INSTILL 1 DROP IN BOTH EYES EVERY DAY IN THE EVENING      levocetirizine (XYZAL) 5 MG tablet Take 1 tablet by mouth every night at bedtime.      linaclotide (LINZESS) 145 MCG capsule capsule Take 1 capsule by mouth Every Morning Before Breakfast. 30 capsule 2    lisinopril (PRINIVIL,ZESTRIL) 20 MG tablet Take 1 tablet by mouth every night at bedtime.      meloxicam (MOBIC) 15 MG tablet       metFORMIN (GLUCOPHAGE) 500 MG tablet take 1 tablet by mouth twice daily with the morning and evening meal      metoprolol tartrate (LOPRESSOR) 25 MG tablet Take  by mouth.      montelukast (SINGULAIR) 10 MG tablet       Mounjaro 2.5 MG/0.5ML solution pen-injector       omeprazole (priLOSEC) 20 MG capsule TAKE 1 CAPSULE BY MOUTH EVERY DAY 30 MINUTES TO 1 HOUR BEFORE A MEAL      rosuvastatin (CRESTOR) 10 MG tablet Take 1 tablet by mouth Daily.      tamsulosin (FLOMAX) 0.4 MG capsule 24 hr capsule Take 1 capsule by mouth Daily. 90 capsule 3    Mirabegron ER (MYRBETRIQ) 25 MG tablet sustained-release 24 hour 24 hr tablet Take 1 tablet by mouth Daily. 30 tablet 2     No current facility-administered medications for this visit.        Allergies:   No Known Allergies     Past Surgical History:  History reviewed. No pertinent surgical history.    Social History:  Social History     Socioeconomic History    Marital status:    Tobacco Use    Smoking status: Never     Passive exposure: Never    Smokeless tobacco: Current     Types: Snuff   Vaping Use    Vaping Use: Never used   Substance and Sexual Activity    Alcohol use: Never    Drug use: Never    Sexual activity: Defer       Family History:  Family History   Problem Relation Age of Onset     Prostate cancer Father        Review of Systems:  Review of Systems   Constitutional:  Negative for chills, fatigue and fever.   HENT: Negative.     Eyes: Negative.    Respiratory:  Positive for shortness of breath. Negative for cough and wheezing.    Cardiovascular:  Negative for leg swelling.   Gastrointestinal:  Negative for abdominal pain, nausea and vomiting.   Endocrine: Negative.    Genitourinary:  Negative for difficulty urinating, dysuria, frequency, penile discharge, penile pain, scrotal swelling, testicular pain and urgency.   Musculoskeletal:  Positive for back pain. Negative for joint swelling.   Skin: Negative.    Allergic/Immunologic: Negative.    Neurological:  Negative for dizziness, facial asymmetry and headaches.   Hematological: Negative.    Psychiatric/Behavioral:  Negative for confusion.        Physical Exam:  Physical Exam    IPSS Questionnaire (AUA-7):  IPSS Questionnaire (AUA-7):                  IPSS Questionnaire (AUA-7):  Over the past month…    1)  Incomplete Emptying  How often have you had a sensation of not emptying your bladder?  0 - Not at all   2)  Frequency  How often have you had to urinate less than every two hours? 0 - Not at all   3)  Intermittency  How often have you found you stopped and started again several times when you urinated?  0 - Not at all   4) Urgency  How often have you found it difficult to postpone urination?  0 - Not at all   5) Weak Stream  How often have you had a weak urinary stream?  0 - Not at all   6) Straining  How often have you had to push or strain to begin urination?  0 - Not at all   7) Nocturia  How many times did you typically get up at night to urinate?  2 - 2 times   Total Score:  2   The International Prostate Symptom Score (IPSS) is used to screen, diagnose, track symptoms of benign prostatic hyperplasia (BPH).    0-7 pts (Mild Symptoms)  / 8-19 pts (Moderate) / 20-35 (Severe)    Quality of life due to urinary symptoms:  If you were to spend  the rest of your life with your urinary condition the way it is now, how would you feel about that? 1-Pleased   Urine Leakage (Incontinence) 1-Mild (A few drops a day, no pad use)       Recent Image (CT and/or KUB):   CT Abdomen and Pelvis: No results found for this or any previous visit.     CT Stone Protocol: Results for orders placed during the hospital encounter of 02/21/23    CT Abdomen Pelvis Stone Protocol    Narrative  EXAM:  CT Abdomen and Pelvis Without Intravenous Contrast    EXAM DATE:  2/21/2023 11:23 AM    CLINICAL HISTORY:  R31.0; R31.0-Gross hematuria    TECHNIQUE:  Axial computed tomography images of the abdomen and pelvis without  intravenous contrast.  Sagittal and coronal reformatted images were  created and reviewed.  This CT exam was performed using one or more of  the following dose reduction techniques:  automated exposure control,  adjustment of the mA and/or kV according to patient size, and/or use of  iterative reconstruction technique.    COMPARISON:  No relevant prior studies available.    FINDINGS:  Lung bases:  Lung bases are clear.  Heart:  Cardiomegaly.    ABDOMEN:  Liver:  Unremarkable.  Gallbladder and bile ducts:  Marked distention of the gallbladder with  layering stones. Correlate with ultrasound.  No ductal dilation.  Pancreas:  Unremarkable.  No ductal dilation.  Spleen:  Unremarkable.  No splenomegaly.  Adrenals:  Unremarkable.  No mass.  Kidneys and ureters:  Bilateral nonobstructing left greater than right  kidney stones.  6 mm right UPJ stone causing no significant  hydronephrosis.  No additional ureteral stones identified. No ureteral  dilatation.  Stomach and bowel:  Moderate constipation is noted. No bowel  obstruction identified.  Sigmoid diverticulosis without evidence of  acute diverticulitis.  Duodenal diverticulum incidentally noted in a  characteristic location.    PELVIS:  Appendix:  Normal appendix.  Bladder:  Layering stones within the urinary  bladder.  Reproductive:  Prostate enlargement with Central calcifications.    ABDOMEN and PELVIS:  Intraperitoneal space:  No pneumoperitoneum identified.  No ascites or  abscess.  Bones/joints:  Degenerative changes lumbar spine.  No acute bony  findings.  No dislocation.  Soft tissues:  Small FAT only containing umbilical hernia.  Vasculature:  Unremarkable.  No abdominal aortic aneurysm.  Lymph nodes:  Unremarkable.  No enlarged lymph nodes.    Impression  1.  Bilateral nonobstructing kidney stones, left greater than right.  2.  6 mm right UPJ stone causing no significant hydronephrosis.  3.  Layering stones within the urinary bladder dependent portion.  4.  Prostate enlargement.  5.  Marked gallbladder distention with layering stones. Correlate with  ultrasound and clinical history.  6.  Moderate constipation. Diverticulosis. No diverticulitis.  7.  Other incidental and nonacute findings detailed above.    This report was finalized on 2/21/2023 12:13 PM by Dr. Eric Stokes MD.     KUB: Results for orders placed in visit on 03/09/23    XR abdomen kub    Narrative  EXAM:  XR Abdomen, 1 View    EXAM DATE:  3/9/2023 2:22 PM    CLINICAL HISTORY:  nephrolithiasis; N20.0-Calculus of kidney    TECHNIQUE:  Frontal supine view of the abdomen/pelvis.    COMPARISON:  CT performed 02/21/2023    FINDINGS:  GASTROINTESTINAL TRACT:  Abundant stool throughout the colon.  No  dilation.  ORGANS:  Known left renal calculi are again noted.  BONES/JOINTS:  Unremarkable.  VASCULATURE:  Pelvic calcifications, some of which are vascular and  others may be within the urinary bladder are again noted.    Impression  1.  Known left renal calculi are again noted.  2.  Pelvic calcifications, some of which are vascular and others may be  within the urinary bladder are again noted.  3.  Abundant stool throughout the colon.    This report was finalized on 3/10/2023 8:35 AM by Dr. Fernando Wise MD.       Labs:  Brief Urine Lab Results  (Last  result in the past 365 days)        Color   Clarity   Blood   Leuk Est   Nitrite   Protein   CREAT   Urine HCG        03/09/23 1359 Yellow   Clear   Negative   Negative   Negative   3+                 No visits with results within 3 Month(s) from this visit.   Latest known visit with results is:   Office Visit on 04/13/2023   Component Date Value Ref Range Status    PSA 04/13/2023 6.3 (H)  0.0 - 4.0 ng/mL Final    Comment: Roche ECLIA methodology.  According to the American Urological Association, Serum PSA should  decrease and remain at undetectable levels after radical  prostatectomy. The AUA defines biochemical recurrence as an initial  PSA value 0.2 ng/mL or greater followed by a subsequent confirmatory  PSA value 0.2 ng/mL or greater.  Values obtained with different assay methods or kits cannot be used  interchangeably. Results cannot be interpreted as absolute evidence  of the presence or absence of malignant disease.      PSA, Free 04/13/2023 0.71  N/A ng/mL Final    Roche ECLIA methodology.    PSA, Free % 04/13/2023 11.3  % Final    Comment: The table below lists the probability of prostate cancer for  men with non-suspicious SOM results and total PSA between  4 and 10 ng/mL, by patient age (Gosia et al, ABHINAV 1998,  279:1542).                    % Free PSA       50-64 yr        65-75 yr                    0.00-10.00%        56%             55%                   10.01-15.00%        24%             35%                   15.01-20.00%        17%             23%                   20.01-25.00%        10%             20%                        >25.00%         5%              9%  Please note:  Gosia et al did not make specific                recommendations regarding the use of                percent free PSA for any other population                of men.          Procedure: None  Procedures     I have reviewed and agree with the above PMH, PSH, FMH, social history, medications, allergies, and labs.      Assessment/Plan:   Problem List Items Addressed This Visit          Genitourinary and Reproductive     Nephrolithiasis    Benign prostatic hyperplasia    Relevant Medications    Mirabegron ER (MYRBETRIQ) 25 MG tablet sustained-release 24 hour 24 hr tablet    Elevated prostate specific antigen (PSA) - Primary    Relevant Orders    PSA Total+% Free (Serial)       Health Maintenance:   Health Maintenance Due   Topic Date Due    URINE MICROALBUMIN  Never done    BMI FOLLOWUP  Never done    COLORECTAL CANCER SCREENING  Never done    COVID-19 Vaccine (1) Never done    Pneumococcal Vaccine 65+ (1 of 2 - PCV) Never done    ZOSTER VACCINE (1 of 2) Never done    TDAP/TD VACCINES (2 - Tdap) 10/12/2009    HEPATITIS C SCREENING  Never done    DIABETIC FOOT EXAM  Never done    INFLUENZA VACCINE  08/01/2023    HEMOGLOBIN A1C  12/20/2023        Smoking Counseling:Never smoked.  Current user of smokeless tobacco.  Counseling given however not ready to quit at this time    Urine Incontinence: Patient reports that he is not currently experiencing any symptoms of urinary incontinence.    Patient was given instructions and counseling regarding his condition or for health maintenance advice. Please see specific information pulled into the AVS if appropriate.    Patient Education:   Elevated PSA/enlarged prostate: Discussed the pathophysiology of enlarged prostate and obstruction.  We discussed the static and dynamic effects of enlarged prostate as well as using 5 alpha reductase inhibitors versus alpha blockade.  We discussed the indications for transurethral surgery as well and/ or other therapeutic options available including all of the newer techniques.  Patient has been on Flomax and Myrbetriq with significant improvement in symptoms.  He is well pleased and wishes to continue both medications.  Will give the patient a few more samples of Myrbetriq in office today.  PSA testing - I am recommending a prostate specific antigen  blood test or PSA.  I discussed the pathophysiology of PSA testing indicating its use in the diagnosis and management of prostate cancer.  I discussed the normal range being 0 to 4, but more appropriately being much closer to 0 to 2 in a normal male.  I discussed the fact that after a certain age it is against recommendation to use PSA testing especially in view of numerous comorbidities.  I discussed many of the things that can artificially raise PSA including put not limited to a recent infection, urinary tract infection, recent sexual intercourse, or even the type of movement such as manipulation of the prostate from riding a bicycle.  It was discussed that the most important use of PSA is the velocity measurement.  This refers to the change of PSA with time. I discussed that we look for greater than 20% rise over a year to help us make the prediction of prostate cancer.  I also discussed that in the case of prostate cancer indicating a radical prostatectomy, the PSA should be 0 and any rise indicates an early biochemical recurrence.  I will call patient with PSA results once obtained.  Patient states that if PSA remains high he will proceed forward with an MRI of the abdomen pelvis with and without contrast.  Discussed the risk and benefits of this as well.  Patient is unable to form MRI we will proceed forward with a biopsy.  Discussed the risk and benefits of this procedure as well.  Patient verbalized understanding.  Nephrolithiasis -patient has bilateral nonobstructing kidney stones but is doing well.  He states he has passed 1 stone and denies any back or flank pain.  Will have him continue with Flomax daily.  Will have the patient continue to increase water intake 2 to 3 L/day.  Otherwise he is doing well and we will see him back pending PSA    Visit Diagnoses:    ICD-10-CM ICD-9-CM   1. Elevated prostate specific antigen (PSA)  R97.20 790.93   2. Nephrolithiasis  N20.0 592.0   3. Benign prostatic  hyperplasia with nocturia  N40.1 600.01    R35.1 788.43       Meds Ordered During Visit:  New Medications Ordered This Visit   Medications    Mirabegron ER (MYRBETRIQ) 25 MG tablet sustained-release 24 hour 24 hr tablet     Sig: Take 1 tablet by mouth Daily.     Dispense:  30 tablet     Refill:  2       Follow Up Appointment: Pending PSA  No follow-ups on file.      This document has been electronically signed by Ag Bautista PA-C   January 19, 2024 15:05 EST    Part of this note may be an electronic transcription/translation of spoken language to printed text using the Dragon Dictation System.

## 2024-01-22 LAB
PSA FREE MFR SERPL: 14.5 %
PSA FREE SERPL-MCNC: 0.9 NG/ML
PSA SERPL-MCNC: 6.2 NG/ML (ref 0–4)

## 2024-01-23 ENCOUNTER — TELEPHONE (OUTPATIENT)
Dept: UROLOGY | Facility: CLINIC | Age: 74
End: 2024-01-23
Payer: MEDICARE

## 2024-01-23 DIAGNOSIS — R97.20 ELEVATED PROSTATE SPECIFIC ANTIGEN (PSA): Primary | ICD-10-CM

## 2024-01-23 NOTE — TELEPHONE ENCOUNTER
Called patient to discuss PSA results.  Advised him PSA remains stable at this time.  I am recommending proceed forward with an MRI to rule out prostatic cancer.  Patient verbalized understanding.  Will schedule him appropriately

## 2024-01-31 ENCOUNTER — HOSPITAL ENCOUNTER (OUTPATIENT)
Dept: MRI IMAGING | Facility: HOSPITAL | Age: 74
Discharge: HOME | End: 2024-01-31
Payer: MEDICARE

## 2024-01-31 DIAGNOSIS — R97.20 ELEVATED PROSTATE SPECIFIC ANTIGEN (PSA): ICD-10-CM

## 2024-01-31 LAB — CREAT BLDA-MCNC: 1.2 MG/DL (ref 0.6–1.3)

## 2024-01-31 PROCEDURE — A9577 INJ MULTIHANCE: HCPCS

## 2024-01-31 PROCEDURE — 72197 MRI PELVIS W/O & W/DYE: CPT | Performed by: RADIOLOGY

## 2024-01-31 PROCEDURE — 0 GADOBENATE DIMEGLUMINE 529 MG/ML SOLUTION

## 2024-01-31 PROCEDURE — 72197 MRI PELVIS W/O & W/DYE: CPT

## 2024-01-31 PROCEDURE — 82565 ASSAY OF CREATININE: CPT

## 2024-01-31 RX ADMIN — GADOBENATE DIMEGLUMINE 14 ML: 529 INJECTION, SOLUTION INTRAVENOUS at 09:45

## 2024-02-01 ENCOUNTER — TELEPHONE (OUTPATIENT)
Dept: UROLOGY | Facility: CLINIC | Age: 74
End: 2024-02-01
Payer: MEDICARE

## 2024-02-01 NOTE — TELEPHONE ENCOUNTER
Called patient to discuss MRI results.  Advised him there was concerns of bladder calculi on MRI and recommended cystoscopy for evaluation.  Also advised him he did have a 8.3 mm transitional PI-RADS 4 lesion.  Educated patient this is high risk for prostate carcinoma and recommended to undergo biopsy.  Patient would like to think about undergoing a biopsy before being scheduled.  Did discuss the risks of delayed diagnosis for prostate cancer.  He verbalized understanding.  He like to follow-up in roughly 3 to 6 months.  Advised him return to clinic sooner or call with any questions or concerns.  He verbalized understanding

## 2024-02-23 ENCOUNTER — TELEPHONE (OUTPATIENT)
Dept: UROLOGY | Facility: CLINIC | Age: 74
End: 2024-02-23
Payer: MEDICARE

## 2024-02-23 DIAGNOSIS — N20.0 NEPHROLITHIASIS: ICD-10-CM

## 2024-02-23 RX ORDER — TAMSULOSIN HYDROCHLORIDE 0.4 MG/1
1 CAPSULE ORAL DAILY
Qty: 90 CAPSULE | Refills: 3 | Status: SHIPPED | OUTPATIENT
Start: 2024-02-23

## 2024-02-23 NOTE — TELEPHONE ENCOUNTER
[facility] pharmacy called requesting a refill for the pt's tamsulosin. I sent in for them.

## 2024-04-19 ENCOUNTER — OFFICE VISIT (OUTPATIENT)
Dept: UROLOGY | Facility: CLINIC | Age: 74
End: 2024-04-19
Payer: MEDICARE

## 2024-04-19 ENCOUNTER — HOSPITAL ENCOUNTER (OUTPATIENT)
Dept: GENERAL RADIOLOGY | Facility: HOSPITAL | Age: 74
Discharge: HOME | End: 2024-04-19
Payer: MEDICARE

## 2024-04-19 VITALS
WEIGHT: 159 LBS | HEIGHT: 66 IN | HEART RATE: 78 BPM | DIASTOLIC BLOOD PRESSURE: 70 MMHG | SYSTOLIC BLOOD PRESSURE: 161 MMHG | BODY MASS INDEX: 25.55 KG/M2

## 2024-04-19 DIAGNOSIS — N21.0 BLADDER CALCULI: ICD-10-CM

## 2024-04-19 DIAGNOSIS — N20.0 NEPHROLITHIASIS: Primary | ICD-10-CM

## 2024-04-19 DIAGNOSIS — R97.20 ELEVATED PROSTATE SPECIFIC ANTIGEN (PSA): ICD-10-CM

## 2024-04-19 DIAGNOSIS — N20.0 NEPHROLITHIASIS: ICD-10-CM

## 2024-04-19 PROCEDURE — 74018 RADEX ABDOMEN 1 VIEW: CPT

## 2024-04-19 RX ORDER — AMOXICILLIN AND CLAVULANATE POTASSIUM 875; 125 MG/1; MG/1
1 TABLET, FILM COATED ORAL EVERY 12 HOURS SCHEDULED
COMMUNITY
Start: 2024-04-09

## 2024-04-19 NOTE — PROGRESS NOTES
"Chief Complaint:    Chief Complaint   Patient presents with    Elevated PSA    bladder stone       Vital Signs:   /70 (BP Location: Right arm, Patient Position: Sitting)   Pulse 78   Ht 167.6 cm (65.98\")   Wt 72.1 kg (159 lb)   BMI 25.68 kg/m²   Body mass index is 25.68 kg/m².      HPI:  Jamin Julio is a 73 y.o. male who presents today for follow up    History of Present Illness  Mr. Julio presents to the clinic for follow-up for nephrolithiasis as well as elevated PSA.  He was last seen in office in January 2024 and had a PSA that came back elevated at roughly 6.3.  His free percentage was roughly 14% and correlated to a 17% risk for prostate carcinoma.  Patient has had previous MRI of the prostate with and without contrast that revealed a PI-RADS 4 lesion.  He was given appropriate counseling in the past however still does not wish to undergo biopsy at this time.  Patient reports that he has had intermittent left-sided back and flank pain over the past week.  He does endorse passing some kidney stones recently.  He reports that pain has improved but still colicky at times.  He also reports some intermittent gross hematuria.  Did repeat a KUB in office today that revealed bilateral kidney stones greater on left than right.  There was also concerns of a distal left ureteral calculus in the mid pelvic area.  There was known phleboliths that remained unchanged.      Past Medical History:  Past Medical History:   Diagnosis Date    Diabetes mellitus     Hypertension     Kidney stone        Current Meds:  Current Outpatient Medications   Medication Sig Dispense Refill    albuterol sulfate  (90 Base) MCG/ACT inhaler Inhale 2 puffs Every 4 (Four) Hours As Needed for Shortness of Air.      amoxicillin-clavulanate (AUGMENTIN) 875-125 MG per tablet Take 1 tablet by mouth Every 12 (Twelve) Hours.      Aspirin Low Dose 81 MG EC tablet Take 1 tablet by mouth Daily.      budesonide-formoterol (SYMBICORT) 160-4.5 " MCG/ACT inhaler Inhale 2 puffs 2 (Two) Times a Day. Rinse mouth after each use      carvedilol (COREG) 3.125 MG tablet Take 1 tablet by mouth 2 (Two) Times a Day With Meals.      cholecalciferol (VITAMIN D3) 25 MCG (1000 UT) tablet Take 1 tablet by mouth Daily.      glimepiride (AMARYL) 1 MG tablet 1 tablet.      Januvia 100 MG tablet 1 tablet.      lactulose (CHRONULAC) 10 GM/15ML solution Take 30 mL by mouth 2 (Two) Times a Day As Needed (for constipation). 237 mL 1    latanoprost (XALATAN) 0.005 % ophthalmic solution INSTILL 1 DROP IN BOTH EYES EVERY DAY IN THE EVENING      levocetirizine (XYZAL) 5 MG tablet Take 1 tablet by mouth every night at bedtime.      linaclotide (LINZESS) 145 MCG capsule capsule Take 1 capsule by mouth Every Morning Before Breakfast. 30 capsule 2    lisinopril (PRINIVIL,ZESTRIL) 20 MG tablet Take 1 tablet by mouth every night at bedtime.      meloxicam (MOBIC) 15 MG tablet       metFORMIN (GLUCOPHAGE) 500 MG tablet take 1 tablet by mouth twice daily with the morning and evening meal      metoprolol tartrate (LOPRESSOR) 25 MG tablet Take  by mouth.      Mirabegron ER (MYRBETRIQ) 25 MG tablet sustained-release 24 hour 24 hr tablet Take 1 tablet by mouth Daily. 30 tablet 2    montelukast (SINGULAIR) 10 MG tablet       Mounjaro 2.5 MG/0.5ML solution pen-injector       omeprazole (priLOSEC) 20 MG capsule TAKE 1 CAPSULE BY MOUTH EVERY DAY 30 MINUTES TO 1 HOUR BEFORE A MEAL      rosuvastatin (CRESTOR) 10 MG tablet Take 1 tablet by mouth Daily.      tamsulosin (FLOMAX) 0.4 MG capsule 24 hr capsule Take 1 capsule by mouth Daily. 90 capsule 3     No current facility-administered medications for this visit.        Allergies:   No Known Allergies     Past Surgical History:  History reviewed. No pertinent surgical history.    Social History:  Social History     Socioeconomic History    Marital status:    Tobacco Use    Smoking status: Never     Passive exposure: Never    Smokeless tobacco:  Current     Types: Snuff   Vaping Use    Vaping status: Never Used   Substance and Sexual Activity    Alcohol use: Never    Drug use: Never    Sexual activity: Defer       Family History:  Family History   Problem Relation Age of Onset    Prostate cancer Father        Review of Systems:  Review of Systems   Constitutional:  Negative for chills, fatigue and fever.   Respiratory:  Negative for cough, shortness of breath and wheezing.    Cardiovascular:  Negative for leg swelling.   Gastrointestinal:  Negative for abdominal pain, nausea and vomiting.   Genitourinary:  Positive for frequency, hematuria and urgency. Negative for difficulty urinating and dysuria.   Musculoskeletal:  Positive for back pain. Negative for joint swelling.   Neurological:  Negative for dizziness and headaches.   Psychiatric/Behavioral:  Negative for confusion.        Physical Exam:  Physical Exam  Constitutional:       General: He is not in acute distress.     Appearance: Normal appearance.   HENT:      Head: Normocephalic and atraumatic.      Nose: Nose normal.      Mouth/Throat:      Mouth: Mucous membranes are moist.   Eyes:      Conjunctiva/sclera: Conjunctivae normal.   Cardiovascular:      Rate and Rhythm: Normal rate and regular rhythm.      Pulses: Normal pulses.      Heart sounds: Normal heart sounds.   Pulmonary:      Effort: Pulmonary effort is normal.      Breath sounds: Normal breath sounds.   Abdominal:      General: Bowel sounds are normal.      Palpations: Abdomen is soft.   Musculoskeletal:         General: Normal range of motion.      Cervical back: Normal range of motion.   Skin:     General: Skin is warm.   Neurological:      General: No focal deficit present.      Mental Status: He is alert and oriented to person, place, and time.   Psychiatric:         Mood and Affect: Mood normal.         Behavior: Behavior normal.         Thought Content: Thought content normal.         Judgment: Judgment normal.                  Recent  Image (CT and/or KUB):   CT Abdomen and Pelvis: No results found for this or any previous visit.     CT Stone Protocol: Results for orders placed during the hospital encounter of 02/21/23    CT Abdomen Pelvis Stone Protocol    Narrative  EXAM:  CT Abdomen and Pelvis Without Intravenous Contrast    EXAM DATE:  2/21/2023 11:23 AM    CLINICAL HISTORY:  R31.0; R31.0-Gross hematuria    TECHNIQUE:  Axial computed tomography images of the abdomen and pelvis without  intravenous contrast.  Sagittal and coronal reformatted images were  created and reviewed.  This CT exam was performed using one or more of  the following dose reduction techniques:  automated exposure control,  adjustment of the mA and/or kV according to patient size, and/or use of  iterative reconstruction technique.    COMPARISON:  No relevant prior studies available.    FINDINGS:  Lung bases:  Lung bases are clear.  Heart:  Cardiomegaly.    ABDOMEN:  Liver:  Unremarkable.  Gallbladder and bile ducts:  Marked distention of the gallbladder with  layering stones. Correlate with ultrasound.  No ductal dilation.  Pancreas:  Unremarkable.  No ductal dilation.  Spleen:  Unremarkable.  No splenomegaly.  Adrenals:  Unremarkable.  No mass.  Kidneys and ureters:  Bilateral nonobstructing left greater than right  kidney stones.  6 mm right UPJ stone causing no significant  hydronephrosis.  No additional ureteral stones identified. No ureteral  dilatation.  Stomach and bowel:  Moderate constipation is noted. No bowel  obstruction identified.  Sigmoid diverticulosis without evidence of  acute diverticulitis.  Duodenal diverticulum incidentally noted in a  characteristic location.    PELVIS:  Appendix:  Normal appendix.  Bladder:  Layering stones within the urinary bladder.  Reproductive:  Prostate enlargement with Central calcifications.    ABDOMEN and PELVIS:  Intraperitoneal space:  No pneumoperitoneum identified.  No ascites or  abscess.  Bones/joints:  Degenerative  changes lumbar spine.  No acute bony  findings.  No dislocation.  Soft tissues:  Small FAT only containing umbilical hernia.  Vasculature:  Unremarkable.  No abdominal aortic aneurysm.  Lymph nodes:  Unremarkable.  No enlarged lymph nodes.    Impression  1.  Bilateral nonobstructing kidney stones, left greater than right.  2.  6 mm right UPJ stone causing no significant hydronephrosis.  3.  Layering stones within the urinary bladder dependent portion.  4.  Prostate enlargement.  5.  Marked gallbladder distention with layering stones. Correlate with  ultrasound and clinical history.  6.  Moderate constipation. Diverticulosis. No diverticulitis.  7.  Other incidental and nonacute findings detailed above.    This report was finalized on 2/21/2023 12:13 PM by Dr. Eric Stokes MD.     KUB: Results for orders placed in visit on 03/09/23    XR abdomen kub    Narrative  EXAM:  XR Abdomen, 1 View    EXAM DATE:  3/9/2023 2:22 PM    CLINICAL HISTORY:  nephrolithiasis; N20.0-Calculus of kidney    TECHNIQUE:  Frontal supine view of the abdomen/pelvis.    COMPARISON:  CT performed 02/21/2023    FINDINGS:  GASTROINTESTINAL TRACT:  Abundant stool throughout the colon.  No  dilation.  ORGANS:  Known left renal calculi are again noted.  BONES/JOINTS:  Unremarkable.  VASCULATURE:  Pelvic calcifications, some of which are vascular and  others may be within the urinary bladder are again noted.    Impression  1.  Known left renal calculi are again noted.  2.  Pelvic calcifications, some of which are vascular and others may be  within the urinary bladder are again noted.  3.  Abundant stool throughout the colon.    This report was finalized on 3/10/2023 8:35 AM by Dr. Fernando Wise MD.       Labs:  Brief Urine Lab Results       None          Hospital Outpatient Visit on 01/31/2024   Component Date Value Ref Range Status    Creatinine 01/31/2024 1.20  0.60 - 1.30 mg/dL Final    Serial Number: [id number]:  [id number]        Procedure:  None  Procedures     I have reviewed and agree with the above PMH, PSH, FMH, social history, medications, allergies, and labs.     Assessment/Plan:   Problem List Items Addressed This Visit       Nephrolithiasis - Primary    Relevant Orders    XR Abdomen KUB (Completed)    Case Request (Completed)    Basic Metabolic Panel    CBC (No Diff)    Bladder calculi    Elevated prostate specific antigen (PSA)       Health Maintenance:   Health Maintenance Due   Topic Date Due    URINE MICROALBUMIN  Never done    BMI FOLLOWUP  Never done    COLORECTAL CANCER SCREENING  Never done    Pneumococcal Vaccine 65+ (1 of 2 - PCV) Never done    ZOSTER VACCINE (1 of 2) Never done    RSV Vaccine - Adults (1 - 1-dose 60+ series) Never done    TDAP/TD VACCINES (2 - Tdap) 10/12/2009    HEPATITIS C SCREENING  Never done    DIABETIC FOOT EXAM  Never done    COVID-19 Vaccine (1 - 2023-24 season) Never done    HEMOGLOBIN A1C  12/20/2023    DIABETIC EYE EXAM  01/20/2024    ANNUAL WELLNESS VISIT  05/24/2024        Smoking Counseling: Never smoked.  Current user of smokeless tobacco.  Counseling given however not ready quit at this time.    Urine Incontinence: Patient reports that he is not currently experiencing any symptoms of urinary incontinence.    Patient was given instructions and counseling regarding his condition or for health maintenance advice. Please see specific information pulled into the AVS if appropriate.    Patient Education:   Nephrolithiasis/bladder calculi -discussed with the patient his most recent x-ray revealing bilateral intrarenal stones greater on left than right.  Also discussed the concerns for possible distal mid pelvic left ureteral calcification on x-ray today.  Patient also has a history of bladder calculi on MRI which could also be bladder stone as well.  Did discuss with the patient forms of surgical treatments which can include but are not limited to extracorporal shockwave lithotripsy, percutaneous  nephrolithotomy, and uteroscopy with holmium laser lithotripsy/bladder litholapaxy bladder stone extraction.  Discussed the risk and benefits of these procedures in detail with the patient.  Given current symptomology patient does wish to undergo a left ESWL and we will get him scheduled with Dr. Gauthier on 4/26/2024.  Also given concerns of a possible left ureteral calculus will have the patient undergo a uteroscopy at the same time for evaluation.  If any bladder stones are identified advised him we will complete a litholapaxy with bladder stone injection as well.  Discussed the risk and benefits of these procedures and he verbalized understanding.  Did advise him to continue with Flomax daily.  Educated patient to discontinue Myrbetriq roughly 72 hours prior to procedure.  Elevated PSA -patient has a past medical history for elevated PSA at roughly 6.2 with a PI-RADS 4 on MRI.  Did read discussed with the patient the use of prostate biopsy for diagnosis of prostate carcinoma.  Discussed the risk of delayed diagnosis which could lead to unwanted sequelae and metastatic disease.  Patient still wishes to hold off on an office biopsy at this time.  Advised him to call anytime and schedule an appointment for biopsy if he wishes.  Otherwise we will see him back post surgery by Dr. Gauthier this coming Friday.    Visit Diagnoses:    ICD-10-CM ICD-9-CM   1. Nephrolithiasis  N20.0 592.0   2. Bladder calculi  N21.0 594.1   3. Elevated prostate specific antigen (PSA)  R97.20 790.93       Meds Ordered During Visit:  No orders of the defined types were placed in this encounter.      Follow Up Appointment: Left ESWL and U scope on 4/26/2024  No follow-ups on file.      This document has been electronically signed by Ag Bautista PA-C   April 19, 2024 12:59 EDT    Part of this note may be an electronic transcription/translation of spoken language to printed text using the Dragon Dictation System.

## 2024-04-19 NOTE — H&P (VIEW-ONLY)
"Chief Complaint:    Chief Complaint   Patient presents with    Elevated PSA    bladder stone       Vital Signs:   /70 (BP Location: Right arm, Patient Position: Sitting)   Pulse 78   Ht 167.6 cm (65.98\")   Wt 72.1 kg (159 lb)   BMI 25.68 kg/m²   Body mass index is 25.68 kg/m².      HPI:  Jamin Julio is a 73 y.o. male who presents today for follow up    History of Present Illness  Mr. Julio presents to the clinic for follow-up for nephrolithiasis as well as elevated PSA.  He was last seen in office in January 2024 and had a PSA that came back elevated at roughly 6.3.  His free percentage was roughly 14% and correlated to a 17% risk for prostate carcinoma.  Patient has had previous MRI of the prostate with and without contrast that revealed a PI-RADS 4 lesion.  He was given appropriate counseling in the past however still does not wish to undergo biopsy at this time.  Patient reports that he has had intermittent left-sided back and flank pain over the past week.  He does endorse passing some kidney stones recently.  He reports that pain has improved but still colicky at times.  He also reports some intermittent gross hematuria.  Did repeat a KUB in office today that revealed bilateral kidney stones greater on left than right.  There was also concerns of a distal left ureteral calculus in the mid pelvic area.  There was known phleboliths that remained unchanged.      Past Medical History:  Past Medical History:   Diagnosis Date    Diabetes mellitus     Hypertension     Kidney stone        Current Meds:  Current Outpatient Medications   Medication Sig Dispense Refill    albuterol sulfate  (90 Base) MCG/ACT inhaler Inhale 2 puffs Every 4 (Four) Hours As Needed for Shortness of Air.      amoxicillin-clavulanate (AUGMENTIN) 875-125 MG per tablet Take 1 tablet by mouth Every 12 (Twelve) Hours.      Aspirin Low Dose 81 MG EC tablet Take 1 tablet by mouth Daily.      budesonide-formoterol (SYMBICORT) 160-4.5 " MCG/ACT inhaler Inhale 2 puffs 2 (Two) Times a Day. Rinse mouth after each use      carvedilol (COREG) 3.125 MG tablet Take 1 tablet by mouth 2 (Two) Times a Day With Meals.      cholecalciferol (VITAMIN D3) 25 MCG (1000 UT) tablet Take 1 tablet by mouth Daily.      glimepiride (AMARYL) 1 MG tablet 1 tablet.      Januvia 100 MG tablet 1 tablet.      lactulose (CHRONULAC) 10 GM/15ML solution Take 30 mL by mouth 2 (Two) Times a Day As Needed (for constipation). 237 mL 1    latanoprost (XALATAN) 0.005 % ophthalmic solution INSTILL 1 DROP IN BOTH EYES EVERY DAY IN THE EVENING      levocetirizine (XYZAL) 5 MG tablet Take 1 tablet by mouth every night at bedtime.      linaclotide (LINZESS) 145 MCG capsule capsule Take 1 capsule by mouth Every Morning Before Breakfast. 30 capsule 2    lisinopril (PRINIVIL,ZESTRIL) 20 MG tablet Take 1 tablet by mouth every night at bedtime.      meloxicam (MOBIC) 15 MG tablet       metFORMIN (GLUCOPHAGE) 500 MG tablet take 1 tablet by mouth twice daily with the morning and evening meal      metoprolol tartrate (LOPRESSOR) 25 MG tablet Take  by mouth.      Mirabegron ER (MYRBETRIQ) 25 MG tablet sustained-release 24 hour 24 hr tablet Take 1 tablet by mouth Daily. 30 tablet 2    montelukast (SINGULAIR) 10 MG tablet       Mounjaro 2.5 MG/0.5ML solution pen-injector       omeprazole (priLOSEC) 20 MG capsule TAKE 1 CAPSULE BY MOUTH EVERY DAY 30 MINUTES TO 1 HOUR BEFORE A MEAL      rosuvastatin (CRESTOR) 10 MG tablet Take 1 tablet by mouth Daily.      tamsulosin (FLOMAX) 0.4 MG capsule 24 hr capsule Take 1 capsule by mouth Daily. 90 capsule 3     No current facility-administered medications for this visit.        Allergies:   No Known Allergies     Past Surgical History:  History reviewed. No pertinent surgical history.    Social History:  Social History     Socioeconomic History    Marital status:    Tobacco Use    Smoking status: Never     Passive exposure: Never    Smokeless tobacco:  Current     Types: Snuff   Vaping Use    Vaping status: Never Used   Substance and Sexual Activity    Alcohol use: Never    Drug use: Never    Sexual activity: Defer       Family History:  Family History   Problem Relation Age of Onset    Prostate cancer Father        Review of Systems:  Review of Systems   Constitutional:  Negative for chills, fatigue and fever.   Respiratory:  Negative for cough, shortness of breath and wheezing.    Cardiovascular:  Negative for leg swelling.   Gastrointestinal:  Negative for abdominal pain, nausea and vomiting.   Genitourinary:  Positive for frequency, hematuria and urgency. Negative for difficulty urinating and dysuria.   Musculoskeletal:  Positive for back pain. Negative for joint swelling.   Neurological:  Negative for dizziness and headaches.   Psychiatric/Behavioral:  Negative for confusion.        Physical Exam:  Physical Exam  Constitutional:       General: He is not in acute distress.     Appearance: Normal appearance.   HENT:      Head: Normocephalic and atraumatic.      Nose: Nose normal.      Mouth/Throat:      Mouth: Mucous membranes are moist.   Eyes:      Conjunctiva/sclera: Conjunctivae normal.   Cardiovascular:      Rate and Rhythm: Normal rate and regular rhythm.      Pulses: Normal pulses.      Heart sounds: Normal heart sounds.   Pulmonary:      Effort: Pulmonary effort is normal.      Breath sounds: Normal breath sounds.   Abdominal:      General: Bowel sounds are normal.      Palpations: Abdomen is soft.   Musculoskeletal:         General: Normal range of motion.      Cervical back: Normal range of motion.   Skin:     General: Skin is warm.   Neurological:      General: No focal deficit present.      Mental Status: He is alert and oriented to person, place, and time.   Psychiatric:         Mood and Affect: Mood normal.         Behavior: Behavior normal.         Thought Content: Thought content normal.         Judgment: Judgment normal.                  Recent  Image (CT and/or KUB):   CT Abdomen and Pelvis: No results found for this or any previous visit.     CT Stone Protocol: Results for orders placed during the hospital encounter of 02/21/23    CT Abdomen Pelvis Stone Protocol    Narrative  EXAM:  CT Abdomen and Pelvis Without Intravenous Contrast    EXAM DATE:  2/21/2023 11:23 AM    CLINICAL HISTORY:  R31.0; R31.0-Gross hematuria    TECHNIQUE:  Axial computed tomography images of the abdomen and pelvis without  intravenous contrast.  Sagittal and coronal reformatted images were  created and reviewed.  This CT exam was performed using one or more of  the following dose reduction techniques:  automated exposure control,  adjustment of the mA and/or kV according to patient size, and/or use of  iterative reconstruction technique.    COMPARISON:  No relevant prior studies available.    FINDINGS:  Lung bases:  Lung bases are clear.  Heart:  Cardiomegaly.    ABDOMEN:  Liver:  Unremarkable.  Gallbladder and bile ducts:  Marked distention of the gallbladder with  layering stones. Correlate with ultrasound.  No ductal dilation.  Pancreas:  Unremarkable.  No ductal dilation.  Spleen:  Unremarkable.  No splenomegaly.  Adrenals:  Unremarkable.  No mass.  Kidneys and ureters:  Bilateral nonobstructing left greater than right  kidney stones.  6 mm right UPJ stone causing no significant  hydronephrosis.  No additional ureteral stones identified. No ureteral  dilatation.  Stomach and bowel:  Moderate constipation is noted. No bowel  obstruction identified.  Sigmoid diverticulosis without evidence of  acute diverticulitis.  Duodenal diverticulum incidentally noted in a  characteristic location.    PELVIS:  Appendix:  Normal appendix.  Bladder:  Layering stones within the urinary bladder.  Reproductive:  Prostate enlargement with Central calcifications.    ABDOMEN and PELVIS:  Intraperitoneal space:  No pneumoperitoneum identified.  No ascites or  abscess.  Bones/joints:  Degenerative  changes lumbar spine.  No acute bony  findings.  No dislocation.  Soft tissues:  Small FAT only containing umbilical hernia.  Vasculature:  Unremarkable.  No abdominal aortic aneurysm.  Lymph nodes:  Unremarkable.  No enlarged lymph nodes.    Impression  1.  Bilateral nonobstructing kidney stones, left greater than right.  2.  6 mm right UPJ stone causing no significant hydronephrosis.  3.  Layering stones within the urinary bladder dependent portion.  4.  Prostate enlargement.  5.  Marked gallbladder distention with layering stones. Correlate with  ultrasound and clinical history.  6.  Moderate constipation. Diverticulosis. No diverticulitis.  7.  Other incidental and nonacute findings detailed above.    This report was finalized on 2/21/2023 12:13 PM by Dr. Eric Stokes MD.     KUB: Results for orders placed in visit on 03/09/23    XR abdomen kub    Narrative  EXAM:  XR Abdomen, 1 View    EXAM DATE:  3/9/2023 2:22 PM    CLINICAL HISTORY:  nephrolithiasis; N20.0-Calculus of kidney    TECHNIQUE:  Frontal supine view of the abdomen/pelvis.    COMPARISON:  CT performed 02/21/2023    FINDINGS:  GASTROINTESTINAL TRACT:  Abundant stool throughout the colon.  No  dilation.  ORGANS:  Known left renal calculi are again noted.  BONES/JOINTS:  Unremarkable.  VASCULATURE:  Pelvic calcifications, some of which are vascular and  others may be within the urinary bladder are again noted.    Impression  1.  Known left renal calculi are again noted.  2.  Pelvic calcifications, some of which are vascular and others may be  within the urinary bladder are again noted.  3.  Abundant stool throughout the colon.    This report was finalized on 3/10/2023 8:35 AM by Dr. Fernando Wise MD.       Labs:  Brief Urine Lab Results       None          Hospital Outpatient Visit on 01/31/2024   Component Date Value Ref Range Status    Creatinine 01/31/2024 1.20  0.60 - 1.30 mg/dL Final    Serial Number: [id number]:  [id number]        Procedure:  None  Procedures     I have reviewed and agree with the above PMH, PSH, FMH, social history, medications, allergies, and labs.     Assessment/Plan:   Problem List Items Addressed This Visit       Nephrolithiasis - Primary    Relevant Orders    XR Abdomen KUB (Completed)    Case Request (Completed)    Basic Metabolic Panel    CBC (No Diff)    Bladder calculi    Elevated prostate specific antigen (PSA)       Health Maintenance:   Health Maintenance Due   Topic Date Due    URINE MICROALBUMIN  Never done    BMI FOLLOWUP  Never done    COLORECTAL CANCER SCREENING  Never done    Pneumococcal Vaccine 65+ (1 of 2 - PCV) Never done    ZOSTER VACCINE (1 of 2) Never done    RSV Vaccine - Adults (1 - 1-dose 60+ series) Never done    TDAP/TD VACCINES (2 - Tdap) 10/12/2009    HEPATITIS C SCREENING  Never done    DIABETIC FOOT EXAM  Never done    COVID-19 Vaccine (1 - 2023-24 season) Never done    HEMOGLOBIN A1C  12/20/2023    DIABETIC EYE EXAM  01/20/2024    ANNUAL WELLNESS VISIT  05/24/2024        Smoking Counseling: Never smoked.  Current user of smokeless tobacco.  Counseling given however not ready quit at this time.    Urine Incontinence: Patient reports that he is not currently experiencing any symptoms of urinary incontinence.    Patient was given instructions and counseling regarding his condition or for health maintenance advice. Please see specific information pulled into the AVS if appropriate.    Patient Education:   Nephrolithiasis/bladder calculi -discussed with the patient his most recent x-ray revealing bilateral intrarenal stones greater on left than right.  Also discussed the concerns for possible distal mid pelvic left ureteral calcification on x-ray today.  Patient also has a history of bladder calculi on MRI which could also be bladder stone as well.  Did discuss with the patient forms of surgical treatments which can include but are not limited to extracorporal shockwave lithotripsy, percutaneous  nephrolithotomy, and uteroscopy with holmium laser lithotripsy/bladder litholapaxy bladder stone extraction.  Discussed the risk and benefits of these procedures in detail with the patient.  Given current symptomology patient does wish to undergo a left ESWL and we will get him scheduled with Dr. Gauthier on 4/26/2024.  Also given concerns of a possible left ureteral calculus will have the patient undergo a uteroscopy at the same time for evaluation.  If any bladder stones are identified advised him we will complete a litholapaxy with bladder stone injection as well.  Discussed the risk and benefits of these procedures and he verbalized understanding.  Did advise him to continue with Flomax daily.  Educated patient to discontinue Myrbetriq roughly 72 hours prior to procedure.  Elevated PSA -patient has a past medical history for elevated PSA at roughly 6.2 with a PI-RADS 4 on MRI.  Did read discussed with the patient the use of prostate biopsy for diagnosis of prostate carcinoma.  Discussed the risk of delayed diagnosis which could lead to unwanted sequelae and metastatic disease.  Patient still wishes to hold off on an office biopsy at this time.  Advised him to call anytime and schedule an appointment for biopsy if he wishes.  Otherwise we will see him back post surgery by Dr. Gauthier this coming Friday.    Visit Diagnoses:    ICD-10-CM ICD-9-CM   1. Nephrolithiasis  N20.0 592.0   2. Bladder calculi  N21.0 594.1   3. Elevated prostate specific antigen (PSA)  R97.20 790.93       Meds Ordered During Visit:  No orders of the defined types were placed in this encounter.      Follow Up Appointment: Left ESWL and U scope on 4/26/2024  No follow-ups on file.      This document has been electronically signed by Ag Bautista PA-C   April 19, 2024 12:59 EDT    Part of this note may be an electronic transcription/translation of spoken language to printed text using the Dragon Dictation System.

## 2024-04-22 DIAGNOSIS — N21.0 BLADDER CALCULI: Primary | ICD-10-CM

## 2024-04-23 NOTE — DISCHARGE INSTRUCTIONS
Please discontinue all blood thinners and anticoagulants (except aspirin) prior to surgery as per your surgeon and cardiologist instructions.  Aspirin may be continued up to the day prior to surgery.    HOLD all diabetic medications the morning of surgery as order by physician.    Please follow instructions on use of prep cloths provided by nurse. Return instruction sheet to pre-op nurse on day of surgery.    Arrival time for surgery on 4/26/24  will be given to you by Dr. Gauthier's Office.    A RESPONSIBLE PERSON MUST REMAIN IN THE WAITING ROOM DURING YOUR PROCEDURE AND A RESPONSIBLE  MUST BE AVAILABLE UPON YOUR DISCHARGE.    General Instructions:  Do NOT eat or drink after midnight 4/25/24 which includes water, mints, or gum.  You may brush your teeth. Dental appliances that are removable must be taken out day of surgery.  Do NOT smoke, chew tobacco, or drink alcohol within 24 hours prior to surgery.  Bring medications in original bottles, any inhalers and if applicable your C-PAP/BI-PAP machine  Bring any papers given to you in the doctor’s office  Wear clean, comfortable clothes and socks  Do NOT wear contact lenses or make-up or dark nail polish.  Bring a case for your glasses if applicable.  Bring crutches or walker if applicable  Leave all other valuables and jewelry at home  If you were given a blood bank armband, continue to wear it until discharged.    Preventing a Surgical Site Infection:  Shower the night before surgery (unless instructed otherwise) using a fresh bar of anti-bacterial soap (such as Dial) and clean washcloth.  Dry with a clean towel and dress in clean clothing.  For 2 to 3 days before surgery, avoid shaving with a razor near where you will have surgery because the razor can irritate skin and make it easier to develop an infection.  Ask your surgeon if you will be receiving antibiotics prior to surgery.  Make sure you, your family, and all healthcare providers clean their hands with  soap and water or an alcohol-based hand  before caring for you or your wound.  If at all possible, quit smoking as many days before surgery as you can.    Day of Surgery:  Upon arrival, a pre-op nurse and anesthesiologist will review your health history, obtain vital signs, and answer questions you may have.  The only belongings needed at this time will be your home medications and if applicable you C-PAP/BI-PAP machine.  If you are staying overnight, your family can leave the rest of your belongings in the car and bring them to your room later.  A pre-op nurse will start an IV and you may receive medication in preparation for surgery.  Due to patient privacy and limited space, only one member of your family will be able to accompany you in the pre-op area.  While you are in surgery your family should notify the waiting room  if they leave the waiting room area and provide a contact number.  Please be aware that surgery does come with discomfort.  We want to make every effort to control your discomfort so please discuss any uncontrolled symptoms with your nurse.  Your doctor will most likely have prescribed pain medications.  If you are going home after surgery you will receive individualized written care instructions before being discharged.  A responsible adult must drive you to and from the hospital on the day of surgery and stay with you for 24 hours.  If you are staying overnight following surgery, you will be transported to your hospital room following the recovery period.

## 2024-04-24 ENCOUNTER — PRE-ADMISSION TESTING (OUTPATIENT)
Dept: PREADMISSION TESTING | Facility: HOSPITAL | Age: 74
DRG: 854 | End: 2024-04-24
Payer: MEDICARE

## 2024-04-24 LAB
QT INTERVAL: 396 MS
QTC INTERVAL: 418 MS

## 2024-04-24 PROCEDURE — 85027 COMPLETE CBC AUTOMATED: CPT

## 2024-04-24 PROCEDURE — 93010 ELECTROCARDIOGRAM REPORT: CPT | Performed by: INTERNAL MEDICINE

## 2024-04-24 PROCEDURE — 80048 BASIC METABOLIC PNL TOTAL CA: CPT

## 2024-04-24 PROCEDURE — 93005 ELECTROCARDIOGRAM TRACING: CPT

## 2024-04-26 ENCOUNTER — ANESTHESIA EVENT (OUTPATIENT)
Dept: PERIOP | Facility: HOSPITAL | Age: 74
End: 2024-04-26
Payer: MEDICARE

## 2024-04-26 ENCOUNTER — HOSPITAL ENCOUNTER (OUTPATIENT)
Facility: HOSPITAL | Age: 74
Setting detail: HOSPITAL OUTPATIENT SURGERY
Discharge: HOME | DRG: 854 | End: 2024-04-26
Attending: UROLOGY | Admitting: UROLOGY
Payer: MEDICARE

## 2024-04-26 ENCOUNTER — ANESTHESIA (OUTPATIENT)
Dept: PERIOP | Facility: HOSPITAL | Age: 74
End: 2024-04-26
Payer: MEDICARE

## 2024-04-26 ENCOUNTER — APPOINTMENT (OUTPATIENT)
Dept: CT IMAGING | Facility: HOSPITAL | Age: 74
DRG: 854 | End: 2024-04-26
Payer: MEDICARE

## 2024-04-26 ENCOUNTER — APPOINTMENT (OUTPATIENT)
Dept: GENERAL RADIOLOGY | Facility: HOSPITAL | Age: 74
DRG: 854 | End: 2024-04-26
Payer: MEDICARE

## 2024-04-26 VITALS
TEMPERATURE: 97.8 F | RESPIRATION RATE: 18 BRPM | HEIGHT: 66 IN | BODY MASS INDEX: 25.39 KG/M2 | HEART RATE: 81 BPM | OXYGEN SATURATION: 95 % | SYSTOLIC BLOOD PRESSURE: 167 MMHG | DIASTOLIC BLOOD PRESSURE: 87 MMHG | WEIGHT: 158 LBS

## 2024-04-26 DIAGNOSIS — N20.0 NEPHROLITHIASIS: ICD-10-CM

## 2024-04-26 LAB — GLUCOSE BLDC GLUCOMTR-MCNC: 151 MG/DL (ref 70–130)

## 2024-04-26 PROCEDURE — 25010000002 FENTANYL CITRATE (PF) 50 MCG/ML SOLUTION: Performed by: NURSE ANESTHETIST, CERTIFIED REGISTERED

## 2024-04-26 PROCEDURE — 74176 CT ABD & PELVIS W/O CONTRAST: CPT | Performed by: RADIOLOGY

## 2024-04-26 PROCEDURE — 25010000002 GLYCOPYRROLATE 0.4 MG/2ML SOLUTION: Performed by: NURSE ANESTHETIST, CERTIFIED REGISTERED

## 2024-04-26 PROCEDURE — 0TC78ZZ EXTIRPATION OF MATTER FROM LEFT URETER, VIA NATURAL OR ARTIFICIAL OPENING ENDOSCOPIC: ICD-10-PCS | Performed by: UROLOGY

## 2024-04-26 PROCEDURE — 82948 REAGENT STRIP/BLOOD GLUCOSE: CPT

## 2024-04-26 PROCEDURE — 0T778DZ DILATION OF LEFT URETER WITH INTRALUMINAL DEVICE, VIA NATURAL OR ARTIFICIAL OPENING ENDOSCOPIC: ICD-10-PCS | Performed by: UROLOGY

## 2024-04-26 PROCEDURE — C2617 STENT, NON-COR, TEM W/O DEL: HCPCS | Performed by: UROLOGY

## 2024-04-26 PROCEDURE — 25810000003 LACTATED RINGERS PER 1000 ML: Performed by: ANESTHESIOLOGY

## 2024-04-26 PROCEDURE — 82365 CALCULUS SPECTROSCOPY: CPT | Performed by: UROLOGY

## 2024-04-26 PROCEDURE — C1769 GUIDE WIRE: HCPCS | Performed by: UROLOGY

## 2024-04-26 PROCEDURE — 52352 CYSTOURETERO W/STONE REMOVE: CPT | Performed by: UROLOGY

## 2024-04-26 PROCEDURE — 25010000002 ONDANSETRON PER 1 MG: Performed by: NURSE ANESTHETIST, CERTIFIED REGISTERED

## 2024-04-26 PROCEDURE — 52317 REMOVE BLADDER STONE: CPT | Performed by: UROLOGY

## 2024-04-26 PROCEDURE — 25010000002 PROPOFOL 200 MG/20ML EMULSION: Performed by: NURSE ANESTHETIST, CERTIFIED REGISTERED

## 2024-04-26 PROCEDURE — 25810000003 LACTATED RINGERS PER 1000 ML: Performed by: NURSE ANESTHETIST, CERTIFIED REGISTERED

## 2024-04-26 PROCEDURE — 50590 FRAGMENTING OF KIDNEY STONE: CPT | Performed by: UROLOGY

## 2024-04-26 PROCEDURE — 25010000002 GENTAMICIN PER 80 MG: Performed by: UROLOGY

## 2024-04-26 PROCEDURE — 74176 CT ABD & PELVIS W/O CONTRAST: CPT

## 2024-04-26 PROCEDURE — 74420 UROGRAPHY RTRGR +-KUB: CPT | Performed by: UROLOGY

## 2024-04-26 DEVICE — URETERAL STENT
Type: IMPLANTABLE DEVICE | Site: URETER | Status: FUNCTIONAL
Brand: POLARIS™ ULTRA

## 2024-04-26 RX ORDER — FENTANYL CITRATE 50 UG/ML
INJECTION, SOLUTION INTRAMUSCULAR; INTRAVENOUS AS NEEDED
Status: DISCONTINUED | OUTPATIENT
Start: 2024-04-26 | End: 2024-04-26 | Stop reason: SURG

## 2024-04-26 RX ORDER — SODIUM CHLORIDE 0.9 % (FLUSH) 0.9 %
10 SYRINGE (ML) INJECTION AS NEEDED
Status: DISCONTINUED | OUTPATIENT
Start: 2024-04-26 | End: 2024-04-26 | Stop reason: HOSPADM

## 2024-04-26 RX ORDER — PROPOFOL 10 MG/ML
INJECTION, EMULSION INTRAVENOUS AS NEEDED
Status: DISCONTINUED | OUTPATIENT
Start: 2024-04-26 | End: 2024-04-26 | Stop reason: SURG

## 2024-04-26 RX ORDER — FENTANYL CITRATE 50 UG/ML
50 INJECTION, SOLUTION INTRAMUSCULAR; INTRAVENOUS
Status: DISCONTINUED | OUTPATIENT
Start: 2024-04-26 | End: 2024-04-26 | Stop reason: HOSPADM

## 2024-04-26 RX ORDER — LIDOCAINE HYDROCHLORIDE 20 MG/ML
JELLY TOPICAL AS NEEDED
Status: DISCONTINUED | OUTPATIENT
Start: 2024-04-26 | End: 2024-04-26 | Stop reason: HOSPADM

## 2024-04-26 RX ORDER — HYDROCODONE BITARTRATE AND ACETAMINOPHEN 10; 325 MG/1; MG/1
1 TABLET ORAL EVERY 6 HOURS PRN
Qty: 20 TABLET | Refills: 0 | Status: SHIPPED | OUTPATIENT
Start: 2024-04-26

## 2024-04-26 RX ORDER — SODIUM CHLORIDE, SODIUM LACTATE, POTASSIUM CHLORIDE, CALCIUM CHLORIDE 600; 310; 30; 20 MG/100ML; MG/100ML; MG/100ML; MG/100ML
100 INJECTION, SOLUTION INTRAVENOUS ONCE AS NEEDED
Status: DISCONTINUED | OUTPATIENT
Start: 2024-04-26 | End: 2024-04-26 | Stop reason: HOSPADM

## 2024-04-26 RX ORDER — SODIUM CHLORIDE, SODIUM LACTATE, POTASSIUM CHLORIDE, CALCIUM CHLORIDE 600; 310; 30; 20 MG/100ML; MG/100ML; MG/100ML; MG/100ML
125 INJECTION, SOLUTION INTRAVENOUS ONCE
Status: COMPLETED | OUTPATIENT
Start: 2024-04-26 | End: 2024-04-26

## 2024-04-26 RX ORDER — MIDAZOLAM HYDROCHLORIDE 1 MG/ML
0.5 INJECTION INTRAMUSCULAR; INTRAVENOUS
Status: DISCONTINUED | OUTPATIENT
Start: 2024-04-26 | End: 2024-04-26 | Stop reason: HOSPADM

## 2024-04-26 RX ORDER — MAGNESIUM HYDROXIDE 1200 MG/15ML
LIQUID ORAL AS NEEDED
Status: DISCONTINUED | OUTPATIENT
Start: 2024-04-26 | End: 2024-04-26 | Stop reason: HOSPADM

## 2024-04-26 RX ORDER — GLYCOPYRROLATE 0.2 MG/ML
INJECTION INTRAMUSCULAR; INTRAVENOUS AS NEEDED
Status: DISCONTINUED | OUTPATIENT
Start: 2024-04-26 | End: 2024-04-26 | Stop reason: SURG

## 2024-04-26 RX ORDER — ONDANSETRON 2 MG/ML
4 INJECTION INTRAMUSCULAR; INTRAVENOUS AS NEEDED
Status: DISCONTINUED | OUTPATIENT
Start: 2024-04-26 | End: 2024-04-26 | Stop reason: HOSPADM

## 2024-04-26 RX ORDER — PHENYLEPHRINE HCL IN 0.9% NACL 1 MG/10 ML
SYRINGE (ML) INTRAVENOUS AS NEEDED
Status: DISCONTINUED | OUTPATIENT
Start: 2024-04-26 | End: 2024-04-26 | Stop reason: SURG

## 2024-04-26 RX ORDER — SODIUM CHLORIDE 0.9 % (FLUSH) 0.9 %
10 SYRINGE (ML) INJECTION EVERY 12 HOURS SCHEDULED
Status: DISCONTINUED | OUTPATIENT
Start: 2024-04-26 | End: 2024-04-26 | Stop reason: HOSPADM

## 2024-04-26 RX ORDER — SODIUM CHLORIDE 9 MG/ML
40 INJECTION, SOLUTION INTRAVENOUS AS NEEDED
Status: DISCONTINUED | OUTPATIENT
Start: 2024-04-26 | End: 2024-04-26 | Stop reason: HOSPADM

## 2024-04-26 RX ORDER — FAMOTIDINE 10 MG/ML
INJECTION, SOLUTION INTRAVENOUS AS NEEDED
Status: DISCONTINUED | OUTPATIENT
Start: 2024-04-26 | End: 2024-04-26 | Stop reason: SURG

## 2024-04-26 RX ORDER — LIDOCAINE HYDROCHLORIDE 20 MG/ML
INJECTION, SOLUTION EPIDURAL; INFILTRATION; INTRACAUDAL; PERINEURAL AS NEEDED
Status: DISCONTINUED | OUTPATIENT
Start: 2024-04-26 | End: 2024-04-26 | Stop reason: SURG

## 2024-04-26 RX ORDER — SODIUM CHLORIDE, SODIUM LACTATE, POTASSIUM CHLORIDE, CALCIUM CHLORIDE 600; 310; 30; 20 MG/100ML; MG/100ML; MG/100ML; MG/100ML
INJECTION, SOLUTION INTRAVENOUS CONTINUOUS PRN
Status: DISCONTINUED | OUTPATIENT
Start: 2024-04-26 | End: 2024-04-26 | Stop reason: SURG

## 2024-04-26 RX ORDER — GENTAMICIN SULFATE 80 MG/100ML
80 INJECTION, SOLUTION INTRAVENOUS ONCE
Status: COMPLETED | OUTPATIENT
Start: 2024-04-26 | End: 2024-04-26

## 2024-04-26 RX ORDER — IPRATROPIUM BROMIDE AND ALBUTEROL SULFATE 2.5; .5 MG/3ML; MG/3ML
3 SOLUTION RESPIRATORY (INHALATION) ONCE AS NEEDED
Status: DISCONTINUED | OUTPATIENT
Start: 2024-04-26 | End: 2024-04-26 | Stop reason: HOSPADM

## 2024-04-26 RX ORDER — OXYCODONE HYDROCHLORIDE AND ACETAMINOPHEN 5; 325 MG/1; MG/1
1 TABLET ORAL ONCE AS NEEDED
Status: COMPLETED | OUTPATIENT
Start: 2024-04-26 | End: 2024-04-26

## 2024-04-26 RX ORDER — ONDANSETRON 2 MG/ML
INJECTION INTRAMUSCULAR; INTRAVENOUS AS NEEDED
Status: DISCONTINUED | OUTPATIENT
Start: 2024-04-26 | End: 2024-04-26 | Stop reason: SURG

## 2024-04-26 RX ADMIN — PROPOFOL 75 MG: 10 INJECTION, EMULSION INTRAVENOUS at 10:52

## 2024-04-26 RX ADMIN — Medication 100 MCG: at 11:10

## 2024-04-26 RX ADMIN — GENTAMICIN SULFATE 80 MG: 80 INJECTION, SOLUTION INTRAVENOUS at 10:47

## 2024-04-26 RX ADMIN — FENTANYL CITRATE 50 MCG: 50 INJECTION INTRAMUSCULAR; INTRAVENOUS at 10:52

## 2024-04-26 RX ADMIN — Medication 100 MCG: at 11:12

## 2024-04-26 RX ADMIN — FENTANYL CITRATE 50 MCG: 50 INJECTION, SOLUTION INTRAMUSCULAR; INTRAVENOUS at 11:52

## 2024-04-26 RX ADMIN — SODIUM CHLORIDE, POTASSIUM CHLORIDE, SODIUM LACTATE AND CALCIUM CHLORIDE: 600; 310; 30; 20 INJECTION, SOLUTION INTRAVENOUS at 10:47

## 2024-04-26 RX ADMIN — Medication 100 MCG: at 11:06

## 2024-04-26 RX ADMIN — LIDOCAINE HYDROCHLORIDE 60 MG: 20 INJECTION, SOLUTION EPIDURAL; INFILTRATION; INTRACAUDAL; PERINEURAL at 10:52

## 2024-04-26 RX ADMIN — Medication 100 MCG: at 11:01

## 2024-04-26 RX ADMIN — ONDANSETRON 4 MG: 2 INJECTION INTRAMUSCULAR; INTRAVENOUS at 10:53

## 2024-04-26 RX ADMIN — FENTANYL CITRATE 50 MCG: 50 INJECTION INTRAMUSCULAR; INTRAVENOUS at 11:05

## 2024-04-26 RX ADMIN — FAMOTIDINE 20 MG: 10 INJECTION, SOLUTION INTRAVENOUS at 10:47

## 2024-04-26 RX ADMIN — FENTANYL CITRATE 50 MCG: 50 INJECTION, SOLUTION INTRAMUSCULAR; INTRAVENOUS at 11:47

## 2024-04-26 RX ADMIN — SODIUM CHLORIDE, POTASSIUM CHLORIDE, SODIUM LACTATE AND CALCIUM CHLORIDE 125 ML/HR: 600; 310; 30; 20 INJECTION, SOLUTION INTRAVENOUS at 09:38

## 2024-04-26 RX ADMIN — OXYCODONE HYDROCHLORIDE AND ACETAMINOPHEN 1 TABLET: 5; 325 TABLET ORAL at 12:17

## 2024-04-26 RX ADMIN — Medication 100 MCG: at 11:04

## 2024-04-26 RX ADMIN — GLYCOPYRROLATE 0.2 MG: 0.4 INJECTION INTRAMUSCULAR; INTRAVENOUS at 10:55

## 2024-04-26 NOTE — ANESTHESIA PROCEDURE NOTES
Airway  Urgency: elective    Date/Time: 4/26/2024 10:52 AM  Airway not difficult    General Information and Staff    Patient location during procedure: OR  Anesthesiologist: Silviano Blake MD  CRNA/CAA: Evelia Berry CRNA    Indications and Patient Condition  Indications for airway management: airway protection    Preoxygenated: yes  Mask difficulty assessment: 0 - not attempted    Final Airway Details  Final airway type: supraglottic airway      Successful airway: classic  Size 4     Number of attempts at approach: 1  Assessment: lips, teeth, and gum same as pre-op    Additional Comments  LMA placed with no trauma noted. Patient tolerated well. Good seal. Secured.

## 2024-04-26 NOTE — OP NOTE
Jamin Julio  4/26/2024    Pre-op Diagnosis:   Nephrolithiasis [N20.0]  Bladder calculus  Distal ureteral calculus  Post-op Diagnosis:     Post-Op Diagnosis Codes:     * Nephrolithiasis [N20.0]    Procedure/CPT® Codes:  23-year-old white male who has a PI-RADS 4 prostate lesion and who was been recommended to have an ultrasound and biopsy presented with a left-sided painful renal stone massive hydronephrosis secondary to distal left ureteral stone stone and a bladder calculus he now presents for treatment following an informed consent brought the procedure suite.  Regards to the left renal stone ESWL-the patient is a candidate for extracorporeal shockwave lithotripsy.  We discussed the type of stone and the complications associated with the procedure including, but not limited to, pain in the flank, hematoma, spontaneous renal hemorrhage, inadequate fragmentation of stones, the need for passage of the stones, the need for concomitant additional procedures in the range of 24%, the risk of a distal fragment in the range of 3% requiring ureteroscopic removal, and the fact that sometimes a stent is indicated based on the size and the density of the stone as determined on the CAT scan.  Additionally, we discussed percutaneous nephrostolithotomy.  Including the mini PERC.  With its attendant risks of anesthesia bleeding infection and the fact that is a invasive procedure with the remote possibility of a nephrectomy.  We also discussed the use of ureteroscopy which is a rigid or flexible instrument placed up into the kidney to break up stones with the laser beam and very likely a postop stent and a high likelihood of additional concomitant procedures.  Following an informed consent, he was brought to the operative suite and underwent induction of general endotracheal anesthetic.  The stone was localized at F2 and a total of 3000 shockwaves was administered without complication.  Then did a careful cystoscopy with a 21  Portuguese cystoscope identified the 1.5 cm bladder stone used the 1000 µm laser fiber broke at numerous pieces removing all pieces I then used the Joseph 4.5 ureteroscope advanced up the left orifice identified a large greater than 10 mm stone broke it numerous small pieces and extracted each piece sequentially no complications were encountered I placed a 5 x 26 double-J stent in perfect position visually and fluoroscopically and I will plan to leave it for about 10 days.    Procedure(s):  LEFT ESWL  LEFT URETEROSCOPY   LASER LITHOTRIPSY  Stone basket extraction of fragments  Cystolitholopaxy less than 2.5 cm  Ureteral stent placement  Fluoroscopic monitoring less than 30 minutes    Surgeon(s):  Clayton Gauthier MD    Anesthesia: see anesthesia record    Staff:   Circulator: La Nena Garcia RN  Scrub Person: Katerina Martínez  Assistant: Tigist Lugo LPN    Estimated Blood Loss: none  Urine Voided: * No values recorded between 4/26/2024 10:47 AM and 4/26/2024 11:36 AM *    Specimens:                ID Type Source Tests Collected by Time   1 :  Calculus Urinary Bladder STONE ANALYSIS Clayton Gauthier MD 4/26/2024 1109   2 :  Calculus Kidney, Left STONE ANALYSIS Clayton Gauthier MD 4/26/2024 1122         Drains: 5 x 26 double-J stent    Findings: Excellent fragmentation of kidney stone total removal of ureteral stone and bladder stone    Blood: N/A    Complications: None    Grafts and Implants: None    Clayton Gauthier MD     Date: 4/26/2024  Time: 11:39 EDT

## 2024-04-26 NOTE — ANESTHESIA PREPROCEDURE EVALUATION
Anesthesia Evaluation     Patient summary reviewed and Nursing notes reviewed   no history of anesthetic complications:   NPO Solid Status: > 8 hours             Airway   Mallampati: II  TM distance: >3 FB  Neck ROM: full  Dental - normal exam     Pulmonary     breath sounds clear to auscultation  (+) ,sleep apnea  Cardiovascular   Exercise tolerance: good (4-7 METS)    ECG reviewed  Rhythm: regular  Rate: normal    (+) hypertension, hyperlipidemia      Neuro/Psych- negative ROS  GI/Hepatic/Renal/Endo    (+) GERD, renal disease- stones, diabetes mellitus    Musculoskeletal (-) negative ROS    Abdominal     Abdomen: soft.   Substance History - negative use     OB/GYN          Other - negative ROS       ROS/Med Hx Other:   Normal sinus rhythm with sinus arrhythmia  Septal infarct , age undetermined  Abnormal ECG  No previous ECGs available  Confirmed by Antonio Holley ([id number]) on 4/24/2024 8:07:36 PM                     Anesthesia Plan    ASA 3     general     intravenous induction     Anesthetic plan, risks, benefits, and alternatives have been provided, discussed and informed consent has been obtained with: patient.  Pre-procedure education provided  Use of blood products discussed with  Consented to blood products.    Plan discussed with CRNA.    CODE STATUS:

## 2024-04-26 NOTE — ANESTHESIA POSTPROCEDURE EVALUATION
Patient: Jamin Julio    Procedure Summary       Date: 04/26/24 Room / Location: [facility] OR 09 /  [facility] OR    Anesthesia Start: 1047 Anesthesia Stop: 1136    Procedures:       LEFT ESWL (Left)      LEFT URETEROSCOPY LASER LITHOTRIPSY (Left)      CYSTOSCOPY LITHOLAPAXY BLADDER STONE EXTRACTION Diagnosis:       Nephrolithiasis      (Nephrolithiasis [N20.0])    Surgeons: Clayton Gauthier MD Provider: Silviano Blake MD    Anesthesia Type: general ASA Status: 3            Anesthesia Type: general    Vitals  Vitals Value Taken Time   /89 04/26/24 1208   Temp 97 °F (36.1 °C) 04/26/24 1138   Pulse 84 04/26/24 1208   Resp 16 04/26/24 1208   SpO2 94 % 04/26/24 1208           Post Anesthesia Care and Evaluation    Patient location during evaluation: PACU  Patient participation: complete - patient participated  Level of consciousness: awake  Pain score: 2  Pain management: adequate    Airway patency: patent  Anesthetic complications: No anesthetic complications  PONV Status: controlled  Cardiovascular status: acceptable and blood pressure returned to baseline  Respiratory status: acceptable and room air  Hydration status: acceptable    Comments: Patient comfortable with discharge at this time.

## 2024-04-27 ENCOUNTER — HOSPITAL ENCOUNTER (INPATIENT)
Facility: HOSPITAL | Age: 74
LOS: 4 days | Discharge: HOME HEALTH | DRG: 854 | End: 2024-05-01
Attending: EMERGENCY MEDICINE | Admitting: HOSPITALIST
Payer: MEDICARE

## 2024-04-27 ENCOUNTER — APPOINTMENT (OUTPATIENT)
Dept: CT IMAGING | Facility: HOSPITAL | Age: 74
DRG: 854 | End: 2024-04-27
Payer: MEDICARE

## 2024-04-27 ENCOUNTER — APPOINTMENT (OUTPATIENT)
Dept: GENERAL RADIOLOGY | Facility: HOSPITAL | Age: 74
DRG: 854 | End: 2024-04-27
Payer: MEDICARE

## 2024-04-27 DIAGNOSIS — E11.9 TYPE 2 DIABETES MELLITUS WITHOUT COMPLICATION, WITHOUT LONG-TERM CURRENT USE OF INSULIN: ICD-10-CM

## 2024-04-27 DIAGNOSIS — N12 PYELONEPHRITIS: Primary | ICD-10-CM

## 2024-04-27 LAB
ABO GROUP BLD: NORMAL
ABO GROUP BLD: NORMAL
ALBUMIN SERPL-MCNC: 4 G/DL (ref 3.5–5.2)
ALBUMIN/GLOB SERPL: 1.2 G/DL
ALP SERPL-CCNC: 85 U/L (ref 39–117)
ALT SERPL W P-5'-P-CCNC: 23 U/L (ref 1–41)
ANION GAP SERPL CALCULATED.3IONS-SCNC: 13.9 MMOL/L (ref 5–15)
APTT PPP: 35.2 SECONDS (ref 26.5–34.5)
AST SERPL-CCNC: 22 U/L (ref 1–40)
BACTERIA UR QL AUTO: ABNORMAL /HPF
BASOPHILS # BLD AUTO: 0.02 10*3/MM3 (ref 0–0.2)
BASOPHILS NFR BLD AUTO: 0.1 % (ref 0–1.5)
BILIRUB SERPL-MCNC: 0.3 MG/DL (ref 0–1.2)
BILIRUB UR QL STRIP: NEGATIVE
BLD GP AB SCN SERPL QL: NEGATIVE
BUN SERPL-MCNC: 25 MG/DL (ref 8–23)
BUN/CREAT SERPL: 15.9 (ref 7–25)
CALCIUM SPEC-SCNC: 9.3 MG/DL (ref 8.6–10.5)
CHLORIDE SERPL-SCNC: 100 MMOL/L (ref 98–107)
CLARITY UR: ABNORMAL
CO2 SERPL-SCNC: 22.1 MMOL/L (ref 22–29)
COLOR UR: YELLOW
CREAT SERPL-MCNC: 1.57 MG/DL (ref 0.76–1.27)
CRP SERPL-MCNC: 5.14 MG/DL (ref 0–0.5)
D-LACTATE SERPL-SCNC: 1.4 MMOL/L (ref 0.5–2)
D-LACTATE SERPL-SCNC: 2.8 MMOL/L (ref 0.5–2)
DEPRECATED RDW RBC AUTO: 43.9 FL (ref 37–54)
EGFRCR SERPLBLD CKD-EPI 2021: 46 ML/MIN/1.73
EOSINOPHIL # BLD AUTO: 0.08 10*3/MM3 (ref 0–0.4)
EOSINOPHIL NFR BLD AUTO: 0.5 % (ref 0.3–6.2)
ERYTHROCYTE [DISTWIDTH] IN BLOOD BY AUTOMATED COUNT: 13.6 % (ref 12.3–15.4)
ERYTHROCYTE [SEDIMENTATION RATE] IN BLOOD: 32 MM/HR (ref 0–20)
FLUAV RNA RESP QL NAA+PROBE: NOT DETECTED
FLUBV RNA RESP QL NAA+PROBE: NOT DETECTED
GEN 5 2HR TROPONIN T REFLEX: 24 NG/L
GLOBULIN UR ELPH-MCNC: 3.3 GM/DL
GLUCOSE BLDC GLUCOMTR-MCNC: 208 MG/DL (ref 70–130)
GLUCOSE BLDC GLUCOMTR-MCNC: 284 MG/DL (ref 70–130)
GLUCOSE SERPL-MCNC: 299 MG/DL (ref 65–99)
GLUCOSE UR STRIP-MCNC: ABNORMAL MG/DL
HBA1C MFR BLD: 7.1 % (ref 4.8–5.6)
HCT VFR BLD AUTO: 35.7 % (ref 37.5–51)
HGB BLD-MCNC: 11.7 G/DL (ref 13–17.7)
HGB UR QL STRIP.AUTO: ABNORMAL
HOLD SPECIMEN: NORMAL
HOLD SPECIMEN: NORMAL
HYALINE CASTS UR QL AUTO: ABNORMAL /LPF
IMM GRANULOCYTES # BLD AUTO: 0.06 10*3/MM3 (ref 0–0.05)
IMM GRANULOCYTES NFR BLD AUTO: 0.4 % (ref 0–0.5)
INR PPP: 1 (ref 0.9–1.1)
KETONES UR QL STRIP: NEGATIVE
LEUKOCYTE ESTERASE UR QL STRIP.AUTO: ABNORMAL
LYMPHOCYTES # BLD AUTO: 0.54 10*3/MM3 (ref 0.7–3.1)
LYMPHOCYTES NFR BLD AUTO: 3.5 % (ref 19.6–45.3)
MAGNESIUM SERPL-MCNC: 1.7 MG/DL (ref 1.6–2.4)
MCH RBC QN AUTO: 28.8 PG (ref 26.6–33)
MCHC RBC AUTO-ENTMCNC: 32.8 G/DL (ref 31.5–35.7)
MCV RBC AUTO: 87.9 FL (ref 79–97)
MONOCYTES # BLD AUTO: 1.4 10*3/MM3 (ref 0.1–0.9)
MONOCYTES NFR BLD AUTO: 9 % (ref 5–12)
NEUTROPHILS NFR BLD AUTO: 13.44 10*3/MM3 (ref 1.7–7)
NEUTROPHILS NFR BLD AUTO: 86.5 % (ref 42.7–76)
NITRITE UR QL STRIP: POSITIVE
NRBC BLD AUTO-RTO: 0 /100 WBC (ref 0–0.2)
NT-PROBNP SERPL-MCNC: 291.2 PG/ML (ref 0–900)
PH UR STRIP.AUTO: 6.5 [PH] (ref 5–8)
PLATELET # BLD AUTO: 146 10*3/MM3 (ref 140–450)
PMV BLD AUTO: 11.4 FL (ref 6–12)
POTASSIUM SERPL-SCNC: 4.2 MMOL/L (ref 3.5–5.2)
PROCALCITONIN SERPL-MCNC: 0.58 NG/ML (ref 0–0.25)
PROT SERPL-MCNC: 7.3 G/DL (ref 6–8.5)
PROT UR QL STRIP: ABNORMAL
PROTHROMBIN TIME: 13.7 SECONDS (ref 12.1–14.7)
RBC # BLD AUTO: 4.06 10*6/MM3 (ref 4.14–5.8)
RBC # UR STRIP: ABNORMAL /HPF
REF LAB TEST METHOD: ABNORMAL
RH BLD: POSITIVE
RH BLD: POSITIVE
SARS-COV-2 RNA RESP QL NAA+PROBE: NOT DETECTED
SODIUM SERPL-SCNC: 136 MMOL/L (ref 136–145)
SP GR UR STRIP: >1.03 (ref 1–1.03)
SQUAMOUS #/AREA URNS HPF: ABNORMAL /HPF
T&S EXPIRATION DATE: NORMAL
T4 FREE SERPL-MCNC: 1.37 NG/DL (ref 0.93–1.7)
TROPONIN T DELTA: -2 NG/L
TROPONIN T SERPL HS-MCNC: 25 NG/L
TROPONIN T SERPL HS-MCNC: 26 NG/L
TSH SERPL DL<=0.05 MIU/L-ACNC: 0.92 UIU/ML (ref 0.27–4.2)
UROBILINOGEN UR QL STRIP: ABNORMAL
WBC # UR STRIP: ABNORMAL /HPF
WBC NRBC COR # BLD AUTO: 15.54 10*3/MM3 (ref 3.4–10.8)
WHOLE BLOOD HOLD COAG: NORMAL
WHOLE BLOOD HOLD SPECIMEN: NORMAL

## 2024-04-27 PROCEDURE — 93010 ELECTROCARDIOGRAM REPORT: CPT | Performed by: INTERNAL MEDICINE

## 2024-04-27 PROCEDURE — 70498 CT ANGIOGRAPHY NECK: CPT

## 2024-04-27 PROCEDURE — 93005 ELECTROCARDIOGRAM TRACING: CPT | Performed by: EMERGENCY MEDICINE

## 2024-04-27 PROCEDURE — 83735 ASSAY OF MAGNESIUM: CPT | Performed by: EMERGENCY MEDICINE

## 2024-04-27 PROCEDURE — 0042T CT CEREBRAL PERFUSION W WO CONTRAST: CPT | Performed by: RADIOLOGY

## 2024-04-27 PROCEDURE — 74176 CT ABD & PELVIS W/O CONTRAST: CPT | Performed by: RADIOLOGY

## 2024-04-27 PROCEDURE — 85652 RBC SED RATE AUTOMATED: CPT | Performed by: EMERGENCY MEDICINE

## 2024-04-27 PROCEDURE — 71250 CT THORAX DX C-: CPT

## 2024-04-27 PROCEDURE — 87040 BLOOD CULTURE FOR BACTERIA: CPT | Performed by: EMERGENCY MEDICINE

## 2024-04-27 PROCEDURE — 70450 CT HEAD/BRAIN W/O DYE: CPT

## 2024-04-27 PROCEDURE — 25010000002 PIPERACILLIN SOD-TAZOBACTAM PER 1 G: Performed by: EMERGENCY MEDICINE

## 2024-04-27 PROCEDURE — 25010000002 VANCOMYCIN 5 G RECONSTITUTED SOLUTION: Performed by: EMERGENCY MEDICINE

## 2024-04-27 PROCEDURE — 84439 ASSAY OF FREE THYROXINE: CPT | Performed by: EMERGENCY MEDICINE

## 2024-04-27 PROCEDURE — 84443 ASSAY THYROID STIM HORMONE: CPT | Performed by: EMERGENCY MEDICINE

## 2024-04-27 PROCEDURE — 25510000001 IOPAMIDOL PER 1 ML: Performed by: EMERGENCY MEDICINE

## 2024-04-27 PROCEDURE — 25010000002 PIPERACILLIN SOD-TAZOBACTAM PER 1 G: Performed by: HOSPITALIST

## 2024-04-27 PROCEDURE — 70496 CT ANGIOGRAPHY HEAD: CPT

## 2024-04-27 PROCEDURE — 86901 BLOOD TYPING SEROLOGIC RH(D): CPT

## 2024-04-27 PROCEDURE — 74176 CT ABD & PELVIS W/O CONTRAST: CPT

## 2024-04-27 PROCEDURE — 0042T HC CT CEREBRAL PERFUSION W/WO CONTRAST: CPT

## 2024-04-27 PROCEDURE — 25810000003 SODIUM CHLORIDE 0.9 % SOLUTION: Performed by: EMERGENCY MEDICINE

## 2024-04-27 PROCEDURE — 86901 BLOOD TYPING SEROLOGIC RH(D): CPT | Performed by: EMERGENCY MEDICINE

## 2024-04-27 PROCEDURE — 25810000003 SODIUM CHLORIDE 0.9 % SOLUTION: Performed by: HOSPITALIST

## 2024-04-27 PROCEDURE — 86140 C-REACTIVE PROTEIN: CPT | Performed by: EMERGENCY MEDICINE

## 2024-04-27 PROCEDURE — 82565 ASSAY OF CREATININE: CPT

## 2024-04-27 PROCEDURE — 85730 THROMBOPLASTIN TIME PARTIAL: CPT | Performed by: EMERGENCY MEDICINE

## 2024-04-27 PROCEDURE — 83605 ASSAY OF LACTIC ACID: CPT | Performed by: EMERGENCY MEDICINE

## 2024-04-27 PROCEDURE — 87636 SARSCOV2 & INF A&B AMP PRB: CPT | Performed by: EMERGENCY MEDICINE

## 2024-04-27 PROCEDURE — 70498 CT ANGIOGRAPHY NECK: CPT | Performed by: RADIOLOGY

## 2024-04-27 PROCEDURE — 83880 ASSAY OF NATRIURETIC PEPTIDE: CPT | Performed by: EMERGENCY MEDICINE

## 2024-04-27 PROCEDURE — 87077 CULTURE AEROBIC IDENTIFY: CPT | Performed by: EMERGENCY MEDICINE

## 2024-04-27 PROCEDURE — 86900 BLOOD TYPING SEROLOGIC ABO: CPT

## 2024-04-27 PROCEDURE — 63710000001 INSULIN LISPRO (HUMAN) PER 5 UNITS: Performed by: HOSPITALIST

## 2024-04-27 PROCEDURE — 82948 REAGENT STRIP/BLOOD GLUCOSE: CPT

## 2024-04-27 PROCEDURE — 86900 BLOOD TYPING SEROLOGIC ABO: CPT | Performed by: EMERGENCY MEDICINE

## 2024-04-27 PROCEDURE — 71250 CT THORAX DX C-: CPT | Performed by: RADIOLOGY

## 2024-04-27 PROCEDURE — 84484 ASSAY OF TROPONIN QUANT: CPT | Performed by: EMERGENCY MEDICINE

## 2024-04-27 PROCEDURE — 99285 EMERGENCY DEPT VISIT HI MDM: CPT

## 2024-04-27 PROCEDURE — 87186 SC STD MICRODIL/AGAR DIL: CPT | Performed by: EMERGENCY MEDICINE

## 2024-04-27 PROCEDURE — 87086 URINE CULTURE/COLONY COUNT: CPT | Performed by: EMERGENCY MEDICINE

## 2024-04-27 PROCEDURE — 36415 COLL VENOUS BLD VENIPUNCTURE: CPT | Performed by: EMERGENCY MEDICINE

## 2024-04-27 PROCEDURE — 99223 1ST HOSP IP/OBS HIGH 75: CPT | Performed by: HOSPITALIST

## 2024-04-27 PROCEDURE — 84145 PROCALCITONIN (PCT): CPT | Performed by: EMERGENCY MEDICINE

## 2024-04-27 PROCEDURE — 85025 COMPLETE CBC W/AUTO DIFF WBC: CPT | Performed by: EMERGENCY MEDICINE

## 2024-04-27 PROCEDURE — 86850 RBC ANTIBODY SCREEN: CPT | Performed by: EMERGENCY MEDICINE

## 2024-04-27 PROCEDURE — 71045 X-RAY EXAM CHEST 1 VIEW: CPT

## 2024-04-27 PROCEDURE — 81001 URINALYSIS AUTO W/SCOPE: CPT | Performed by: EMERGENCY MEDICINE

## 2024-04-27 PROCEDURE — 99222 1ST HOSP IP/OBS MODERATE 55: CPT | Performed by: STUDENT IN AN ORGANIZED HEALTH CARE EDUCATION/TRAINING PROGRAM

## 2024-04-27 PROCEDURE — 87154 CUL TYP ID BLD PTHGN 6+ TRGT: CPT | Performed by: EMERGENCY MEDICINE

## 2024-04-27 PROCEDURE — 83036 HEMOGLOBIN GLYCOSYLATED A1C: CPT | Performed by: HOSPITALIST

## 2024-04-27 PROCEDURE — 85610 PROTHROMBIN TIME: CPT | Performed by: EMERGENCY MEDICINE

## 2024-04-27 PROCEDURE — 71045 X-RAY EXAM CHEST 1 VIEW: CPT | Performed by: RADIOLOGY

## 2024-04-27 PROCEDURE — 80053 COMPREHEN METABOLIC PANEL: CPT | Performed by: EMERGENCY MEDICINE

## 2024-04-27 RX ORDER — BISACODYL 5 MG/1
5 TABLET, DELAYED RELEASE ORAL DAILY PRN
Status: DISCONTINUED | OUTPATIENT
Start: 2024-04-27 | End: 2024-05-01 | Stop reason: HOSPADM

## 2024-04-27 RX ORDER — SODIUM CHLORIDE 9 MG/ML
40 INJECTION, SOLUTION INTRAVENOUS AS NEEDED
Status: DISCONTINUED | OUTPATIENT
Start: 2024-04-27 | End: 2024-05-01 | Stop reason: HOSPADM

## 2024-04-27 RX ORDER — BISACODYL 10 MG
10 SUPPOSITORY, RECTAL RECTAL DAILY PRN
Status: DISCONTINUED | OUTPATIENT
Start: 2024-04-27 | End: 2024-05-01 | Stop reason: HOSPADM

## 2024-04-27 RX ORDER — SODIUM CHLORIDE 9 MG/ML
100 INJECTION, SOLUTION INTRAVENOUS CONTINUOUS
Status: DISCONTINUED | OUTPATIENT
Start: 2024-04-27 | End: 2024-05-01

## 2024-04-27 RX ORDER — AMOXICILLIN 250 MG
2 CAPSULE ORAL 2 TIMES DAILY PRN
Status: DISCONTINUED | OUTPATIENT
Start: 2024-04-27 | End: 2024-05-01 | Stop reason: HOSPADM

## 2024-04-27 RX ORDER — INSULIN LISPRO 100 [IU]/ML
2-9 INJECTION, SOLUTION INTRAVENOUS; SUBCUTANEOUS
Status: DISCONTINUED | OUTPATIENT
Start: 2024-04-27 | End: 2024-05-01 | Stop reason: HOSPADM

## 2024-04-27 RX ORDER — DEXTROSE MONOHYDRATE 25 G/50ML
25 INJECTION, SOLUTION INTRAVENOUS
Status: DISCONTINUED | OUTPATIENT
Start: 2024-04-27 | End: 2024-05-01 | Stop reason: HOSPADM

## 2024-04-27 RX ORDER — SODIUM CHLORIDE 0.9 % (FLUSH) 0.9 %
10 SYRINGE (ML) INJECTION AS NEEDED
Status: DISCONTINUED | OUTPATIENT
Start: 2024-04-27 | End: 2024-05-01 | Stop reason: HOSPADM

## 2024-04-27 RX ORDER — GLUCAGON 1 MG/ML
1 KIT INJECTION
Status: DISCONTINUED | OUTPATIENT
Start: 2024-04-27 | End: 2024-05-01 | Stop reason: HOSPADM

## 2024-04-27 RX ORDER — VANCOMYCIN/0.9 % SOD CHLORIDE 1.5G/250ML
20 PLASTIC BAG, INJECTION (ML) INTRAVENOUS ONCE
Qty: 500 ML | Refills: 0 | Status: COMPLETED | OUTPATIENT
Start: 2024-04-27 | End: 2024-04-27

## 2024-04-27 RX ORDER — SODIUM CHLORIDE 0.9 % (FLUSH) 0.9 %
10 SYRINGE (ML) INJECTION EVERY 12 HOURS SCHEDULED
Status: DISCONTINUED | OUTPATIENT
Start: 2024-04-27 | End: 2024-05-01 | Stop reason: HOSPADM

## 2024-04-27 RX ORDER — POLYETHYLENE GLYCOL 3350 17 G/17G
17 POWDER, FOR SOLUTION ORAL DAILY PRN
Status: DISCONTINUED | OUTPATIENT
Start: 2024-04-27 | End: 2024-05-01 | Stop reason: HOSPADM

## 2024-04-27 RX ORDER — ACETAMINOPHEN 500 MG
1000 TABLET ORAL ONCE
Status: COMPLETED | OUTPATIENT
Start: 2024-04-27 | End: 2024-04-27

## 2024-04-27 RX ORDER — NICOTINE POLACRILEX 4 MG
15 LOZENGE BUCCAL
Status: DISCONTINUED | OUTPATIENT
Start: 2024-04-27 | End: 2024-05-01 | Stop reason: HOSPADM

## 2024-04-27 RX ADMIN — SODIUM CHLORIDE 100 ML/HR: 9 INJECTION, SOLUTION INTRAVENOUS at 23:35

## 2024-04-27 RX ADMIN — PIPERACILLIN SODIUM AND TAZOBACTAM SODIUM 4.5 G: 4; .5 INJECTION, POWDER, LYOPHILIZED, FOR SOLUTION INTRAVENOUS at 17:52

## 2024-04-27 RX ADMIN — INSULIN LISPRO 4 UNITS: 100 INJECTION, SOLUTION INTRAVENOUS; SUBCUTANEOUS at 23:36

## 2024-04-27 RX ADMIN — SODIUM CHLORIDE 1000 ML: 9 INJECTION, SOLUTION INTRAVENOUS at 17:52

## 2024-04-27 RX ADMIN — PIPERACILLIN AND TAZOBACTAM 3.38 G: 3; .375 INJECTION, POWDER, LYOPHILIZED, FOR SOLUTION INTRAVENOUS at 23:35

## 2024-04-27 RX ADMIN — VANCOMYCIN HYDROCHLORIDE 1500 MG: 5 INJECTION, POWDER, LYOPHILIZED, FOR SOLUTION INTRAVENOUS at 19:43

## 2024-04-27 RX ADMIN — Medication 10 ML: at 23:35

## 2024-04-27 RX ADMIN — IOPAMIDOL 70 ML: 755 INJECTION, SOLUTION INTRAVENOUS at 16:30

## 2024-04-27 RX ADMIN — IOPAMIDOL 70 ML: 755 INJECTION, SOLUTION INTRAVENOUS at 16:29

## 2024-04-27 RX ADMIN — ACETAMINOPHEN 1000 MG: 500 TABLET ORAL at 20:37

## 2024-04-27 NOTE — CONSULTS
TeleNeurology Progress Note     Subjective     This patient was seen in follow-up for: encephalopathy   Present for the encounter were: self, patient, patient's wife, patient's son, Televideo  RN    HPI:     Jamin Julio is a 73-year-old male with a past medical history of diabetes mellitus type 2, hypertension, kidney stones s/p recent lithotripsy 2024, and black lung who presented to [facility] Emergency Department on 2024 with acute encephalopathy.  The patient's wife and son are present for the encounter and provide the history since the patient is encephalopathic.  Per report, the patient was in his usual state of health until he underwent lithotripsy on 2024.  Per report, there was no complication in the procedure.  Following the procedure, he had vomiting and chills.  Patient's wife denies sweats.  The patient did have a recent sinus infection last week.  He complained of urinary frequency and urgency.  No report of neck pain or headache.  CT head personally reviewed and negative for acute infarct.  CT angiogram head and neck personally reviewed negative for large vessel occlusion.  Temperature 102.9.  WBC 15.5.  Creatinine 1.57.  Heart rate 98.    PMH: DMT2, HTN, kidney stones, black lung  PSH: Lithotripsy (2024), cyst removal  FH: Mother  due to emphysema from, father  due to older age  SH: Patient uses tobacco dip, no alcohol use, no drug use    Objective      Temp:  [102.9 °F (39.4 °C)] 102.9 °F (39.4 °C)  Heart Rate:  [110] 110  Resp:  [14] 14  BP: (145)/(62) 145/62            Objective    Limited due to Televideo encounter  Physical Exam:  General Appearance: Alert, appears confused     Neurological Examination:   Mental status: Alert.  Oriented to person only.  Follows intermittent commands.   Cranial Nerves: Visual fields intact. Extraocular movements intact with no nystagmus. Midline gaze. Face symmetric.    Sensory: Normal sensory exam to light  touch.  Motor:  Absent pronator drift.  Strength: Antigravity in all extremities      Labs:    Lab Results   Component Value Date    WBC 15.54 (H) 04/27/2024    HGB 11.7 (L) 04/27/2024    HCT 35.7 (L) 04/27/2024    MCV 87.9 04/27/2024     04/27/2024     Lab Results   Component Value Date    GLUCOSE 299 (H) 04/27/2024    BUN 25 (H) 04/27/2024    CREATININE 1.57 (H) 04/27/2024    BCR 15.9 04/27/2024    CO2 22.1 04/27/2024    CALCIUM 9.3 04/27/2024    ALBUMIN 4.0 04/27/2024    AST 22 04/27/2024    ALT 23 04/27/2024       Results from last 7 days   Lab Units 04/27/24  1617 04/24/24  1403   SODIUM mmol/L 136 141   POTASSIUM mmol/L 4.2 4.6   CHLORIDE mmol/L 100 104   CO2 mmol/L 22.1 28.1   BUN mg/dL 25* 24*   CREATININE mg/dL 1.57* 1.77*   CALCIUM mg/dL 9.3 9.7   BILIRUBIN mg/dL 0.3  --    ALK PHOS U/L 85  --    ALT (SGPT) U/L 23  --    AST (SGOT) U/L 22  --    GLUCOSE mg/dL 299* 143*         Results Review:      All images and reports were personally reviewed and I agree with the interpretations except as noted below.      CT Perfusion 4/27/2024  Impression: Artifact versus ischemia parameters in the left frontal lobe. No core infarct identified.     I personally reviewed the images and I see left frontal lobe mismatch.  I also reviewed the CT angiogram head and neck and I do not see a left KATHIE occlusion, therefore I favor artifact.    CT Angiogram Head and Neck 4/27/2024  Impression: Essentially unremarkable CTA of the brain with no large vessel occlusion.       CT Head Without Contrast Stroke Protocol 4/27/2024  Impression: No CT evidence of acute intracranial abnormality. Moderate chronic small vessel ischemic disease and mild age-related cerebral atrophy noted. CT Abdomen Pelvis Stone Protocol            Assessment/Plan     Assessment:    Jamin Julio is a 73-year-old male with a past medical history of diabetes mellitus type 2, hypertension, kidney stones s/p recent lithotripsy 4/26/2024, and black lung who  presented to [facility] Emergency Department on 4/27/2024 with acute encephalopathy.  CT head personally reviewed and negative for acute infarct.  Patient presented outside window for IV thrombolytics and also nonfocal exam.  CT angiogram head and neck personally reviewed negative for large vessel occlusion.  Temperature 102.9.  WBC 15.5.  Creatinine 1.57.  Heart rate 98.    # Acute encephalopathy suspect secondary to infection  -Recommend full infectious workup to include chest x-ray, blood culture, and urinalysis  -If the above infectious workup is negative, would recommend lumbar puncture  -Recommend contact patient's treating surgeon as able  -Recommend MRI brain without contrast to rule out underlying stroke  -If blood cultures are positive, would recommend TTE to rule out endocarditis  -Antibiotics per primary team  -Will defer further workup to primary team  -Please do not hesitate to reconsult TeleNeurology if MRI brain abnormal        Patient education: call 911 or present to emergency department with any stroke symptom, including unilateral face, arm, or leg weakness, numbness, or paresthesias, unilateral facial droop, speech deficits, dizziness with nausea, vomiting, nystagmus, and incoordination, visual deficits, or severe onset headache.    Thank you for this consult. TeleNeurology will be available as needed. Please call with questions.     Noemi Mcdaniels MD  [facility]  04/27/24  16:56 EDT

## 2024-04-27 NOTE — ED NOTES
Waiting for IV Zosyn to finish due to only having one line and placed the antibiotics on pump.  Will hang vancomycin once zosyn is finished.

## 2024-04-28 ENCOUNTER — APPOINTMENT (OUTPATIENT)
Dept: CARDIOLOGY | Facility: HOSPITAL | Age: 74
DRG: 854 | End: 2024-04-28
Payer: MEDICARE

## 2024-04-28 LAB
ALBUMIN SERPL-MCNC: 3.9 G/DL (ref 3.5–5.2)
ALBUMIN/GLOB SERPL: 1.3 G/DL
ALP SERPL-CCNC: 82 U/L (ref 39–117)
ALT SERPL W P-5'-P-CCNC: 20 U/L (ref 1–41)
ANION GAP SERPL CALCULATED.3IONS-SCNC: 12.3 MMOL/L (ref 5–15)
AST SERPL-CCNC: 19 U/L (ref 1–40)
BACTERIA BLD CULT: ABNORMAL
BACTERIA ID TEST ISLT QL CULT: ABNORMAL
BASOPHILS # BLD AUTO: 0.02 10*3/MM3 (ref 0–0.2)
BASOPHILS NFR BLD AUTO: 0.1 % (ref 0–1.5)
BH CV ECHO MEAS - AO MAX PG: 7.3 MMHG
BH CV ECHO MEAS - AO MEAN PG: 3 MMHG
BH CV ECHO MEAS - AO ROOT DIAM: 3.2 CM
BH CV ECHO MEAS - AO V2 MAX: 135 CM/SEC
BH CV ECHO MEAS - AO V2 VTI: 28.7 CM
BH CV ECHO MEAS - AVA(I,D): 2.5 CM2
BH CV ECHO MEAS - EDV(CUBED): 89.9 ML
BH CV ECHO MEAS - EDV(MOD-SP2): 48 ML
BH CV ECHO MEAS - EDV(MOD-SP4): 70.8 ML
BH CV ECHO MEAS - EF(MOD-BP): 60.9 %
BH CV ECHO MEAS - EF(MOD-SP2): 56 %
BH CV ECHO MEAS - EF(MOD-SP4): 63.8 %
BH CV ECHO MEAS - EF_3D-VOL: 61 %
BH CV ECHO MEAS - ESV(CUBED): 14.5 ML
BH CV ECHO MEAS - ESV(MOD-SP2): 21.1 ML
BH CV ECHO MEAS - ESV(MOD-SP4): 25.6 ML
BH CV ECHO MEAS - FS: 45.5 %
BH CV ECHO MEAS - IVS/LVPW: 0.82 CM
BH CV ECHO MEAS - IVSD: 0.73 CM
BH CV ECHO MEAS - LA DIMENSION: 3.8 CM
BH CV ECHO MEAS - LAT PEAK E' VEL: 8.5 CM/SEC
BH CV ECHO MEAS - LV DIASTOLIC VOL/BSA (35-75): 39 CM2
BH CV ECHO MEAS - LV MASS(C)D: 114.6 GRAMS
BH CV ECHO MEAS - LV MAX PG: 2.6 MMHG
BH CV ECHO MEAS - LV MEAN PG: 1 MMHG
BH CV ECHO MEAS - LV SYSTOLIC VOL/BSA (12-30): 14.1 CM2
BH CV ECHO MEAS - LV V1 MAX: 80.5 CM/SEC
BH CV ECHO MEAS - LV V1 VTI: 17.3 CM
BH CV ECHO MEAS - LVIDD: 4.5 CM
BH CV ECHO MEAS - LVIDS: 2.44 CM
BH CV ECHO MEAS - LVOT AREA: 4.2 CM2
BH CV ECHO MEAS - LVOT DIAM: 2.3 CM
BH CV ECHO MEAS - LVPWD: 0.89 CM
BH CV ECHO MEAS - MED PEAK E' VEL: 6.7 CM/SEC
BH CV ECHO MEAS - MV A MAX VEL: 68.9 CM/SEC
BH CV ECHO MEAS - MV DEC SLOPE: 199 CM/SEC2
BH CV ECHO MEAS - MV DEC TIME: 0.25 SEC
BH CV ECHO MEAS - MV E MAX VEL: 49.2 CM/SEC
BH CV ECHO MEAS - MV E/A: 0.71
BH CV ECHO MEAS - PA ACC TIME: 0.08 SEC
BH CV ECHO MEAS - SV(LVOT): 71.9 ML
BH CV ECHO MEAS - SV(MOD-SP2): 26.9 ML
BH CV ECHO MEAS - SV(MOD-SP4): 45.2 ML
BH CV ECHO MEAS - SVI(LVOT): 39.6 ML/M2
BH CV ECHO MEAS - SVI(MOD-SP2): 14.8 ML/M2
BH CV ECHO MEAS - SVI(MOD-SP4): 24.9 ML/M2
BH CV ECHO MEAS - TAPSE (>1.6): 1.71 CM
BH CV ECHO MEAS - TR MAX PG: 26.4 MMHG
BH CV ECHO MEAS - TR MAX VEL: 257 CM/SEC
BH CV ECHO MEASUREMENTS AVERAGE E/E' RATIO: 6.47
BH CV XLRA - RV BASE: 3.5 CM
BH CV XLRA - RV LENGTH: 5.7 CM
BH CV XLRA - RV MID: 2.7 CM
BH CV XLRA - TDI S': 9.9 CM/SEC
BILIRUB SERPL-MCNC: 0.5 MG/DL (ref 0–1.2)
BOTTLE TYPE: ABNORMAL
BUN SERPL-MCNC: 19 MG/DL (ref 8–23)
BUN/CREAT SERPL: 11.7 (ref 7–25)
CALCIUM SPEC-SCNC: 9 MG/DL (ref 8.6–10.5)
CHLORIDE SERPL-SCNC: 105 MMOL/L (ref 98–107)
CO2 SERPL-SCNC: 25.7 MMOL/L (ref 22–29)
CREAT SERPL-MCNC: 1.62 MG/DL (ref 0.76–1.27)
CRP SERPL-MCNC: 15 MG/DL (ref 0–0.5)
DEPRECATED RDW RBC AUTO: 44.8 FL (ref 37–54)
EGFRCR SERPLBLD CKD-EPI 2021: 44.3 ML/MIN/1.73
EOSINOPHIL # BLD AUTO: 0.03 10*3/MM3 (ref 0–0.4)
EOSINOPHIL NFR BLD AUTO: 0.2 % (ref 0.3–6.2)
ERYTHROCYTE [DISTWIDTH] IN BLOOD BY AUTOMATED COUNT: 14 % (ref 12.3–15.4)
GLOBULIN UR ELPH-MCNC: 3 GM/DL
GLUCOSE BLDC GLUCOMTR-MCNC: 120 MG/DL (ref 70–130)
GLUCOSE BLDC GLUCOMTR-MCNC: 121 MG/DL (ref 70–130)
GLUCOSE BLDC GLUCOMTR-MCNC: 190 MG/DL (ref 70–130)
GLUCOSE BLDC GLUCOMTR-MCNC: 205 MG/DL (ref 70–130)
GLUCOSE SERPL-MCNC: 127 MG/DL (ref 65–99)
HCT VFR BLD AUTO: 34.7 % (ref 37.5–51)
HGB BLD-MCNC: 11 G/DL (ref 13–17.7)
IMM GRANULOCYTES # BLD AUTO: 0.08 10*3/MM3 (ref 0–0.05)
IMM GRANULOCYTES NFR BLD AUTO: 0.5 % (ref 0–0.5)
LEFT ATRIUM VOLUME INDEX: 29.1 ML/M2
LYMPHOCYTES # BLD AUTO: 0.93 10*3/MM3 (ref 0.7–3.1)
LYMPHOCYTES NFR BLD AUTO: 5.9 % (ref 19.6–45.3)
MCH RBC QN AUTO: 27.8 PG (ref 26.6–33)
MCHC RBC AUTO-ENTMCNC: 31.7 G/DL (ref 31.5–35.7)
MCV RBC AUTO: 87.8 FL (ref 79–97)
MONOCYTES # BLD AUTO: 1.35 10*3/MM3 (ref 0.1–0.9)
MONOCYTES NFR BLD AUTO: 8.5 % (ref 5–12)
NEUTROPHILS NFR BLD AUTO: 13.46 10*3/MM3 (ref 1.7–7)
NEUTROPHILS NFR BLD AUTO: 84.8 % (ref 42.7–76)
NRBC BLD AUTO-RTO: 0 /100 WBC (ref 0–0.2)
PLATELET # BLD AUTO: 143 10*3/MM3 (ref 140–450)
PMV BLD AUTO: 11.5 FL (ref 6–12)
POTASSIUM SERPL-SCNC: 4.1 MMOL/L (ref 3.5–5.2)
PROT SERPL-MCNC: 6.9 G/DL (ref 6–8.5)
QT INTERVAL: 344 MS
QTC INTERVAL: 463 MS
RBC # BLD AUTO: 3.95 10*6/MM3 (ref 4.14–5.8)
SODIUM SERPL-SCNC: 143 MMOL/L (ref 136–145)
STJ: 2.6 CM
WBC NRBC COR # BLD AUTO: 15.87 10*3/MM3 (ref 3.4–10.8)

## 2024-04-28 PROCEDURE — 93356 MYOCRD STRAIN IMG SPCKL TRCK: CPT | Performed by: INTERNAL MEDICINE

## 2024-04-28 PROCEDURE — 85025 COMPLETE CBC W/AUTO DIFF WBC: CPT | Performed by: HOSPITALIST

## 2024-04-28 PROCEDURE — 25010000002 PIPERACILLIN SOD-TAZOBACTAM PER 1 G: Performed by: HOSPITALIST

## 2024-04-28 PROCEDURE — 86140 C-REACTIVE PROTEIN: CPT | Performed by: HOSPITALIST

## 2024-04-28 PROCEDURE — 82948 REAGENT STRIP/BLOOD GLUCOSE: CPT

## 2024-04-28 PROCEDURE — 99222 1ST HOSP IP/OBS MODERATE 55: CPT | Performed by: NURSE PRACTITIONER

## 2024-04-28 PROCEDURE — 93306 TTE W/DOPPLER COMPLETE: CPT | Performed by: INTERNAL MEDICINE

## 2024-04-28 PROCEDURE — 87040 BLOOD CULTURE FOR BACTERIA: CPT | Performed by: NURSE PRACTITIONER

## 2024-04-28 PROCEDURE — 80053 COMPREHEN METABOLIC PANEL: CPT | Performed by: HOSPITALIST

## 2024-04-28 PROCEDURE — 25810000003 SODIUM CHLORIDE 0.9 % SOLUTION: Performed by: HOSPITALIST

## 2024-04-28 PROCEDURE — 93306 TTE W/DOPPLER COMPLETE: CPT

## 2024-04-28 PROCEDURE — 63710000001 INSULIN LISPRO (HUMAN) PER 5 UNITS: Performed by: HOSPITALIST

## 2024-04-28 PROCEDURE — 25010000002 MEROPENEM PER 100 MG

## 2024-04-28 PROCEDURE — 93356 MYOCRD STRAIN IMG SPCKL TRCK: CPT

## 2024-04-28 PROCEDURE — 99233 SBSQ HOSP IP/OBS HIGH 50: CPT | Performed by: STUDENT IN AN ORGANIZED HEALTH CARE EDUCATION/TRAINING PROGRAM

## 2024-04-28 RX ORDER — FINASTERIDE 5 MG/1
5 TABLET, FILM COATED ORAL DAILY
COMMUNITY

## 2024-04-28 RX ORDER — MELOXICAM 7.5 MG/1
15 TABLET ORAL DAILY
Status: CANCELLED | OUTPATIENT
Start: 2024-04-28

## 2024-04-28 RX ORDER — ACETAMINOPHEN 650 MG/1
650 SUPPOSITORY RECTAL EVERY 4 HOURS PRN
Status: DISCONTINUED | OUTPATIENT
Start: 2024-04-28 | End: 2024-05-01 | Stop reason: HOSPADM

## 2024-04-28 RX ORDER — ACETAMINOPHEN 325 MG/1
650 TABLET ORAL EVERY 6 HOURS PRN
Status: DISCONTINUED | OUTPATIENT
Start: 2024-04-28 | End: 2024-05-01 | Stop reason: HOSPADM

## 2024-04-28 RX ORDER — HYDROCODONE BITARTRATE AND ACETAMINOPHEN 10; 325 MG/1; MG/1
1 TABLET ORAL EVERY 6 HOURS PRN
Status: CANCELLED | OUTPATIENT
Start: 2024-04-28

## 2024-04-28 RX ORDER — LISINOPRIL 10 MG/1
20 TABLET ORAL DAILY
Status: CANCELLED | OUTPATIENT
Start: 2024-04-28

## 2024-04-28 RX ORDER — OXYCODONE HYDROCHLORIDE 5 MG/1
5 TABLET ORAL ONCE
Status: COMPLETED | OUTPATIENT
Start: 2024-04-28 | End: 2024-04-28

## 2024-04-28 RX ORDER — MELATONIN
1000 DAILY
COMMUNITY

## 2024-04-28 RX ORDER — GLIPIZIDE 5 MG/1
2.5 TABLET ORAL
Status: CANCELLED | OUTPATIENT
Start: 2024-04-29

## 2024-04-28 RX ADMIN — ACETAMINOPHEN 650 MG: 650 SUPPOSITORY RECTAL at 04:36

## 2024-04-28 RX ADMIN — MEROPENEM 1000 MG: 1 INJECTION, POWDER, FOR SOLUTION INTRAVENOUS at 17:26

## 2024-04-28 RX ADMIN — SODIUM CHLORIDE 100 ML/HR: 9 INJECTION, SOLUTION INTRAVENOUS at 09:37

## 2024-04-28 RX ADMIN — INSULIN LISPRO 2 UNITS: 100 INJECTION, SOLUTION INTRAVENOUS; SUBCUTANEOUS at 09:31

## 2024-04-28 RX ADMIN — OXYCODONE 5 MG: 5 TABLET ORAL at 21:18

## 2024-04-28 RX ADMIN — MEROPENEM 1000 MG: 1 INJECTION, POWDER, FOR SOLUTION INTRAVENOUS at 12:02

## 2024-04-28 RX ADMIN — ACETAMINOPHEN 650 MG: 325 TABLET ORAL at 20:39

## 2024-04-28 RX ADMIN — ACETAMINOPHEN 650 MG: 325 TABLET ORAL at 13:04

## 2024-04-28 RX ADMIN — INSULIN LISPRO 2 UNITS: 100 INJECTION, SOLUTION INTRAVENOUS; SUBCUTANEOUS at 17:26

## 2024-04-28 RX ADMIN — PIPERACILLIN AND TAZOBACTAM 3.38 G: 3; .375 INJECTION, POWDER, LYOPHILIZED, FOR SOLUTION INTRAVENOUS at 09:32

## 2024-04-28 RX ADMIN — Medication 10 ML: at 20:45

## 2024-04-28 RX ADMIN — SODIUM CHLORIDE 100 ML/HR: 9 INJECTION, SOLUTION INTRAVENOUS at 19:29

## 2024-04-28 NOTE — PLAN OF CARE
Goal Outcome Evaluation:              Outcome Evaluation: Pt resting in bed at this time. No s/s of distress noted. Family at bedside. No complaints of pain. Cont IV fluids and IV abx. Will continue POC.

## 2024-04-28 NOTE — PROGRESS NOTES
[facility]     Patient Identification:  Name:  Jamin Julio  Age:  73 y.o.  Sex:  male  :  1950  MRN:  [id number]  Visit Number:  [id number]  ROOM: [address] [place] [address]     Primary Care Provider:  Christophe Jamil PA-C     Date of Admission: 2024    Length of stay in inpatient status:  1    Subjective     Chief Compliant:    Chief Complaint   Patient presents with    Altered Mental Status       Improving from overnight, awake and alert this am with no focal neurologic deficits. Family reports near baseline. Mild hematuria        Objective       Vital Signs:  Temp:  [97.7 °F (36.5 °C)-102.9 °F (39.4 °C)] 98.3 °F (36.8 °C)  Heart Rate:  [] 99  Resp:  [17-18] 18  BP: (125-186)/(57-82) 125/57  SpO2:  [96 %-99 %] 99 %  on  Flow (L/min):  [1] 1;   Device (Oxygen Therapy): nasal cannula  Body mass index is 24.28 kg/m².      ----------------------------------------------------------------------------------------------------------------------  Physical Exam  Vitals and nursing note reviewed.   Constitutional:       General: He is not in acute distress.  HENT:      Head: Normocephalic and atraumatic.   Eyes:      General: No scleral icterus.     Extraocular Movements: Extraocular movements intact.   Cardiovascular:      Rate and Rhythm: Normal rate.      Pulses: Normal pulses.   Pulmonary:      Effort: Pulmonary effort is normal. No respiratory distress.   Abdominal:      Palpations: Abdomen is soft.      Tenderness: There is no abdominal tenderness. There is no guarding.   Musculoskeletal:      Right lower leg: No edema.      Left lower leg: No edema.   Neurological:      General: No focal deficit present.      Mental Status: He is alert. Mental status is at baseline.   Psychiatric:         Mood and Affect: Mood normal.         Behavior: Behavior normal.        ----------------------------------------------------------------------------------------------------------------------          Assessment & Plan      #ESBL bacteremia, urologic source following procedure  #Severe sepsis secondary to above  #RUI w/obstructing uretal stones s/p stent placement w/plan to remove Thursday    -IVF  -Abx changed to meropenem, ID consult  -Urology consult if still inpatient Thursday  -Await bcx sensitivities prior to dc    - - Chronic - -    #T2DM: FSBG qac/qhs w/SSI       Code Status and Medical Interventions:   Ordered at: 04/27/24 2127     Code Status (Patient has no pulse and is not breathing):    CPR (Attempt to Resuscitate)     Medical Interventions (Patient has pulse or is breathing):    Full Support         Disposition: cont inpatient pend above    I have reviewed any copied/forwarded text or data, verified its accuracy, and updated as necessary above.    Navin Carlos MD  [facility]  04/28/24  17:10 EDT

## 2024-04-28 NOTE — CONSULTS
INFECTIOUS DISEASE CONSULTATION REPORT        Patient Identification:  Name:  Jamin Julio  Age:  73 y.o.  Sex:  male  :  1950  MRN:  [id number]   Visit Number:  [id number]  Primary Care Physician:  Christophe Jamil PA-C        Referring Provider: Dr. Carlos    Reason for consult: ESBL bacteremia       LOS: 1 day        Subjective       Subjective     History of present illness:      Thank you Dr. Carlos for allowing us to participate in the care of your patient.  As you well know, Mr. Jamin Julio is a 73 y.o. male with past medical history significant for asthma, black lung, arthritis, type 2 diabetes, hypertension, hyperlipidemia, GERD, JUANCARLOS and kidney stones who recently underwent cystoscopy with lithotripsy of left obstructing ureteral stones and left ureteral stent placement., who presented to [facility] on 2024 for confusion, incontinence, fever.  WBC 15.87.  CRP 15.00.  Urinalysis from 2024 positive culture currently in progress.  Blood cultures from 2024 2 out of 2 sets positive for ESBL E. coli.  Lactic acid 2.8 on admission.  Chest x-ray from 2024 small nodular opacities scattered throughout the lungs.  CT of the chest reports constellation of findings compatible with sarcoidosis, small pericardial effusion, CT of the abdomen pelvis, mild to moderate left hydronephrosis with double-J ureteral stent in place, defect in the left renal pelvis is likely due to a small clot from the recent intervention rather than neoplasm, mildly distended gallbladder with numerous calculi.      Infectious Disease consultation was requested for antimicrobial management.      ---------------------------------------------------------------------------------------------------------------------     Review Of Systems:    Constitutional: Positive fever, no chills and night sweats. No appetite change or unexpected weight change. No fatigue.  Eyes: no eye  drainage, itching or redness.  HEENT: no mouth sores, dysphagia or nose bleed.  Respiratory: no for shortness of breath, cough or production of sputum.  Cardiovascular: no chest pain, no palpitations, no orthopnea.  Gastrointestinal: no nausea, vomiting or diarrhea. No abdominal pain, hematemesis or rectal bleeding.  Genitourinary: no dysuria or polyuria.  Hematologic/lymphatic: no lymph node abnormalities, no easy bruising or easy bleeding.  Musculoskeletal: no muscle or joint pain.  Skin: No rash and no itching.  Neurological: no loss of consciousness, no seizure, no headache.  Behavioral/Psych: no depression or suicidal ideation.  Endocrine: no hot flashes.  Immunologic: negative.    ---------------------------------------------------------------------------------------------------------------------     Past Medical History    Past Medical History:   Diagnosis Date    Arthritis     Asthma     Black lung disease     Diabetes mellitus     Elevated cholesterol     GERD (gastroesophageal reflux disease)     Hypertension     Kidney stone     Sleep apnea     no c-pap       Past Surgical History    Past Surgical History:   Procedure Laterality Date    CATARACT EXTRACTION WITH INTRAOCULAR LENS IMPLANT Bilateral     COLONOSCOPY      CYST REMOVAL Right     hip       Family History    Family History   Problem Relation Age of Onset    Prostate cancer Father        Social History    Social History     Tobacco Use    Smoking status: Never     Passive exposure: Never    Smokeless tobacco: Current     Types: Snuff   Vaping Use    Vaping status: Never Used   Substance Use Topics    Alcohol use: Never    Drug use: Never       Allergies    Patient has no known allergies.  ---------------------------------------------------------------------------------------------------------------------     Home Medications:    Prior to Admission Medications       Prescriptions Last Dose Informant Patient Reported? Taking?    albuterol sulfate HFA  108 (90 Base) MCG/ACT inhaler   Yes No    Inhale 2 puffs Every 4 (Four) Hours As Needed for Shortness of Air.    Aspirin Low Dose 81 MG EC tablet   Yes No    Take 1 tablet by mouth Daily.    budesonide-formoterol (SYMBICORT) 160-4.5 MCG/ACT inhaler   Yes No    Inhale 2 puffs 2 (Two) Times a Day. Rinse mouth after each use    carvedilol (COREG) 3.125 MG tablet   Yes No    Take 1 tablet by mouth 2 (Two) Times a Day With Meals.    glimepiride (AMARYL) 1 MG tablet   Yes No    1 tablet.    HYDROcodone-acetaminophen (NORCO)  MG per tablet   No No    Take 1 tablet by mouth Every 6 (Six) Hours As Needed for Moderate Pain (Pain).    Januvia 100 MG tablet   Yes No    1 tablet.    lactulose (CHRONULAC) 10 GM/15ML solution   No No    Take 30 mL by mouth 2 (Two) Times a Day As Needed (for constipation).    latanoprost (XALATAN) 0.005 % ophthalmic solution   Yes No    INSTILL 1 DROP IN BOTH EYES EVERY DAY IN THE EVENING    levocetirizine (XYZAL) 5 MG tablet   Yes No    Take 1 tablet by mouth every night at bedtime.    linaclotide (LINZESS) 145 MCG capsule capsule   No No    Take 1 capsule by mouth Every Morning Before Breakfast.    lisinopril (PRINIVIL,ZESTRIL) 20 MG tablet   Yes No    Take 1 tablet by mouth every night at bedtime.    meloxicam (MOBIC) 15 MG tablet   Yes No    metFORMIN (GLUCOPHAGE) 500 MG tablet   Yes No    take 1 tablet by mouth twice daily with the morning and evening meal    metoprolol tartrate (LOPRESSOR) 25 MG tablet   Yes No    Take  by mouth.    Mirabegron ER (MYRBETRIQ) 25 MG tablet sustained-release 24 hour 24 hr tablet   No No    Take 1 tablet by mouth Daily.    montelukast (SINGULAIR) 10 MG tablet   Yes No    omeprazole (priLOSEC) 20 MG capsule   Yes No    TAKE 1 CAPSULE BY MOUTH EVERY DAY 30 MINUTES TO 1 HOUR BEFORE A MEAL    rosuvastatin (CRESTOR) 10 MG tablet   Yes No    Take 1 tablet by mouth Daily.    tamsulosin (FLOMAX) 0.4 MG capsule 24 hr capsule   No No    Take 1 capsule by mouth Daily.           ---------------------------------------------------------------------------------------------------------------------    Objective       Objective     Hospital Scheduled Meds:  insulin lispro, 2-9 Units, Subcutaneous, 4x Daily AC & at Bedtime  meropenem, 1,000 mg, Intravenous, Q12H  sodium chloride, 10 mL, Intravenous, Q12H      sodium chloride, 100 mL/hr, Last Rate: 100 mL/hr (04/28/24 0937)      ---------------------------------------------------------------------------------------------------------------------   Vital Signs:  Temp:  [97.7 °F (36.5 °C)-102.9 °F (39.4 °C)] 100.3 °F (37.9 °C)  Heart Rate:  [] 81  Resp:  [14-18] 18  BP: (133-199)/(60-82) 133/60  Mean Arterial Pressure (Non-Invasive) for the past 24 hrs (Last 3 readings):   Noninvasive MAP (mmHg)   04/28/24 1135 80   04/28/24 0635 95   04/27/24 2032 104     SpO2 Percentage    04/27/24 2032 04/28/24 0635 04/28/24 1135   SpO2: 97% 96% 99%     SpO2:  [93 %-99 %] 99 %  on   ;   Device (Oxygen Therapy): room air    Body mass index is 24.28 kg/m².  Wt Readings from Last 3 Encounters:   04/28/24 70.3 kg (155 lb)   04/26/24 71.7 kg (158 lb)   04/19/24 72.1 kg (159 lb)     ---------------------------------------------------------------------------------------------------------------------     Physical Exam:    Constitutional: Elderly  male.  Currently on on room air with no apparent distress.    HENT:  Head: Normocephalic and atraumatic.  Mouth:  Moist mucous membranes.    Eyes:  Conjunctivae and EOM are normal.  No scleral icterus.  Neck:  Neck supple.  No JVD present.    Cardiovascular:  Normal rate, regular rhythm and normal heart sounds with no murmur. No edema.  Pulmonary/Chest:  No respiratory distress, no wheezes, no crackles, with normal breath sounds and good air movement.  Abdominal:  Soft.  Bowel sounds are normal.  No distension and no tenderness.   Musculoskeletal:  No edema, no tenderness, and no deformity.  No swelling or  "redness of joints.  Neurological:  Alert and oriented to person, place only.  Skin:  Skin is warm and dry.  No rash noted.  No pallor.   Psychiatric:  Normal mood and affect.  Behavior is normal.    ---------------------------------------------------------------------------------------------------------------------    Results from last 7 days   Lab Units 04/27/24 1955 04/27/24 1703 04/27/24 1617   HSTROP T ng/L 24* 26* 25*     Results from last 7 days   Lab Units 04/27/24  1703   PROBNP pg/mL 291.2         Results from last 7 days   Lab Units 04/28/24 0203 04/27/24 1955 04/27/24 1703 04/27/24 1617 04/24/24  1403   CRP mg/dL 15.00*  --  5.14*  --   --    LACTATE mmol/L  --  1.4 2.8*  --   --    WBC 10*3/mm3 15.87*  --   --  15.54* 8.92   HEMOGLOBIN g/dL 11.0*  --   --  11.7* 11.0*   HEMATOCRIT % 34.7*  --   --  35.7* 34.2*   MCV fL 87.8  --   --  87.9 89.5   MCHC g/dL 31.7  --   --  32.8 32.2   PLATELETS 10*3/mm3 143  --   --  146 168   INR   --   --   --  1.00  --      Results from last 7 days   Lab Units 04/28/24 0203 04/27/24 1703 04/27/24 1617 04/24/24  1403   SODIUM mmol/L 143  --  136 141   POTASSIUM mmol/L 4.1  --  4.2 4.6   MAGNESIUM mg/dL  --  1.7  --   --    CHLORIDE mmol/L 105  --  100 104   CO2 mmol/L 25.7  --  22.1 28.1   BUN mg/dL 19  --  25* 24*   CREATININE mg/dL 1.62*  --  1.57* 1.77*   CALCIUM mg/dL 9.0  --  9.3 9.7   GLUCOSE mg/dL 127*  --  299* 143*   ALBUMIN g/dL 3.9  --  4.0  --    BILIRUBIN mg/dL 0.5  --  0.3  --    ALK PHOS U/L 82  --  85  --    AST (SGOT) U/L 19  --  22  --    ALT (SGPT) U/L 20  --  23  --    Estimated Creatinine Clearance: 39.8 mL/min (A) (by C-G formula based on SCr of 1.62 mg/dL (H)).  No results found for: \"AMMONIA\"    Hemoglobin A1C   Date/Time Value Ref Range Status   04/27/2024 1703 7.10 (H) 4.80 - 5.60 % Final     Glucose   Date/Time Value Ref Range Status   04/28/2024 1205 120 70 - 130 mg/dL Final   04/28/2024 0641 190 (H) 70 - 130 mg/dL Final   04/27/2024 " "2327 208 (H) 70 - 130 mg/dL Final   04/27/2024 1603 284 (H) 70 - 130 mg/dL Final   04/26/2024 0934 151 (H) 70 - 130 mg/dL Final     Lab Results   Component Value Date    HGBA1C 7.10 (H) 04/27/2024     Lab Results   Component Value Date    TSH 0.918 04/27/2024    FREET4 1.37 04/27/2024       Blood Culture   Date Value Ref Range Status   04/27/2024 Abnormal Stain (C)  Preliminary   04/27/2024 Abnormal Stain (C)  Preliminary     Urine Culture   Date Value Ref Range Status   04/27/2024 Culture in progress  Preliminary     No results found for: \"WOUNDCX\"  No results found for: \"STOOLCX\"  No results found for: \"RESPCX\"  Pain Management Panel           No data to display              I have personally reviewed the above laboratory results.   ---------------------------------------------------------------------------------------------------------------------  Imaging Results (Last 7 Days)       Procedure Component Value Units Date/Time    CT Chest Without Contrast Diagnostic [[id number]] Collected: 04/27/24 1758     Updated: 04/27/24 1813    Narrative:      CLINICAL HISTORY: COPD suspected, abdominal pain.     COMPARISON: CT of the abdomen and pelvis on 4/26/2024.     TECHNIQUE: Noncontrast CT of the chest, abdomen and pelvis was obtained.   Multiplanar reformats were generated Limited exposure control,  adjustment of the mA and/or KV according to patient size or use of  iterative reconstruction technique was utilized.     FINDINGS:     CT CHEST:     Pulmonary arteries: normal.      Heart and pericardium: Normal heart size.  Pericardial effusion.   Extensive coronary calcifications..     Aorta: normal.     Esophagus: normal.     Lungs and airways: Upper lobe predominant nodules and areas of  consolidation along the bronchovascular bundles with mild volume loss  and scarring.  Subsegmental dependent bibasilar atelectasis is noted.   Central airways are patent.     Pleura: normal.     Lymph nodes: Mediastinal and hilar lymph " nodes are calcified.     Musculoskeletal and chest wall: no acute abnormality.     Other: n/a        CT ABDOMEN AND PELVIS:     Hepatobiliary: Gallbladder is moderately distended and contains numerous  calculi.  The liver is normal.     Pancreas: normal.     Spleen: Calcified granulomata are scattered to the otherwise normal  spleen.     Adrenals: normal.      Kidneys, ureters, bladder: Since the prior study, the patient has  undergone placement of a double-J ureteral stent in the left renal  collecting system, with the proximal loop in the upper pole calyx and  the distal loop in the urinary bladder.  The stone seen on the prior  study are not as well-seen on this exam and may have been removed or not  visible due to the contrast media in the renal collecting system.   Filling defects in the renal pelvis may be due to small clot from a  recent intervention.  Correlate with the intra-procedural retrograde  urogram.  The right kidney and ureter are normal.  Urinary bladder is  mildly trabeculated.     Reproductive: Ag gland is mildly enlarged at 5.6 cm in diameter and  is partially calcified.     GI tract/Bowel: Sigmoid diverticulosis without findings for acute  diverticulitis.  No bowel obstruction.  No pneumatosis or portal venous  gas..     Appendix: normal.     Peritoneum: normal, no free air or fluid.     Vascular: Aortoiliac atherosclerosis.     Lymph nodes: normal.     Musculoskeletal and abdominal wall: Multilevel degenerative changes in  the lumbar spine       Impression:         CT CHEST:  CONSTELLATION OF FINDINGS COMPATIBLE WITH SARCOIDOSIS.     SMALL PERICARDIAL EFFUSION.     CT ABDOMEN AND PELVIS:  MILD TO MODERATE LEFT HYDRONEPHROSIS WITH DOUBLE-J URETERAL STENT IN  PLACE.     DEFECT IN THE LEFT RENAL PELVIS IS LIKELY DUE TO A SMALL CLOT FROM THE  RECENT INTERVENTION RATHER THAN A NEOPLASM.  CORRELATE WITH THE PREVIOUS  INTRAOPERATIVE UROGRAM.     MILDLY DISTENDED GALLBLADDER WITH NUMEROUS CALCULI.   NO ADDITIONAL  FINDINGS TO SUGGEST ACUTE CHOLECYSTITIS.     This report was finalized on 4/27/2024 6:10 PM by Trish Chang MD.       CT Abdomen Pelvis Without Contrast [[id number]] Collected: 04/27/24 1758     Updated: 04/27/24 1813    Narrative:      CLINICAL HISTORY: COPD suspected, abdominal pain.     COMPARISON: CT of the abdomen and pelvis on 4/26/2024.     TECHNIQUE: Noncontrast CT of the chest, abdomen and pelvis was obtained.   Multiplanar reformats were generated Limited exposure control,  adjustment of the mA and/or KV according to patient size or use of  iterative reconstruction technique was utilized.     FINDINGS:     CT CHEST:     Pulmonary arteries: normal.      Heart and pericardium: Normal heart size.  Pericardial effusion.   Extensive coronary calcifications..     Aorta: normal.     Esophagus: normal.     Lungs and airways: Upper lobe predominant nodules and areas of  consolidation along the bronchovascular bundles with mild volume loss  and scarring.  Subsegmental dependent bibasilar atelectasis is noted.   Central airways are patent.     Pleura: normal.     Lymph nodes: Mediastinal and hilar lymph nodes are calcified.     Musculoskeletal and chest wall: no acute abnormality.     Other: n/a        CT ABDOMEN AND PELVIS:     Hepatobiliary: Gallbladder is moderately distended and contains numerous  calculi.  The liver is normal.     Pancreas: normal.     Spleen: Calcified granulomata are scattered to the otherwise normal  spleen.     Adrenals: normal.      Kidneys, ureters, bladder: Since the prior study, the patient has  undergone placement of a double-J ureteral stent in the left renal  collecting system, with the proximal loop in the upper pole calyx and  the distal loop in the urinary bladder.  The stone seen on the prior  study are not as well-seen on this exam and may have been removed or not  visible due to the contrast media in the renal collecting system.   Filling defects in the renal  pelvis may be due to small clot from a  recent intervention.  Correlate with the intra-procedural retrograde  urogram.  The right kidney and ureter are normal.  Urinary bladder is  mildly trabeculated.     Reproductive: Ag gland is mildly enlarged at 5.6 cm in diameter and  is partially calcified.     GI tract/Bowel: Sigmoid diverticulosis without findings for acute  diverticulitis.  No bowel obstruction.  No pneumatosis or portal venous  gas..     Appendix: normal.     Peritoneum: normal, no free air or fluid.     Vascular: Aortoiliac atherosclerosis.     Lymph nodes: normal.     Musculoskeletal and abdominal wall: Multilevel degenerative changes in  the lumbar spine       Impression:         CT CHEST:  CONSTELLATION OF FINDINGS COMPATIBLE WITH SARCOIDOSIS.     SMALL PERICARDIAL EFFUSION.     CT ABDOMEN AND PELVIS:  MILD TO MODERATE LEFT HYDRONEPHROSIS WITH DOUBLE-J URETERAL STENT IN  PLACE.     DEFECT IN THE LEFT RENAL PELVIS IS LIKELY DUE TO A SMALL CLOT FROM THE  RECENT INTERVENTION RATHER THAN A NEOPLASM.  CORRELATE WITH THE PREVIOUS  INTRAOPERATIVE UROGRAM.     MILDLY DISTENDED GALLBLADDER WITH NUMEROUS CALCULI.  NO ADDITIONAL  FINDINGS TO SUGGEST ACUTE CHOLECYSTITIS.     This report was finalized on 4/27/2024 6:10 PM by Trish Chang MD.       XR Chest 1 View [[id number]] Collected: 04/27/24 1756     Updated: 04/27/24 1759    Narrative:      CLINICAL HISTORY: Altered mental status.     COMPARISON: CT chest 04/27/2024.     TECHNIQUE: Single portable upright AP view of the chest was obtained.     FINDINGS: Small nodular opacities are scattered the lungs.  No pleural  effusion or pneumothorax is seen.  Heart size is mildly enlarged.  No  acute osseous or upper abdominal abnormality is seen.       Impression:         SMALL NODULAR OPACITIES SCATTERED THROUGH THE LUNGS. CT HAS ALREADY BEEN  PERFORMED.     CARDIOMEGALY.           This report was finalized on 4/27/2024 5:57 PM by Trish Chang MD.       CT  CEREBRAL PERFUSION WITH & WITHOUT CONTRAST [[id number]] Collected: 04/27/24 1646     Updated: 04/27/24 1648    Narrative:      EXAMINATION: CT CEREBRAL PERFUSION W WO CONTRAST-      CLINICAL INDICATION:Neuro deficit, acute, stroke suspected     COMPARISON: CT, CTA same day     TECHNIQUE: Axial computed tomography images of the brain without and  with intravenous contrast using cerebral perfusion protocol.   Post-processing parametric maps were created using [email or web address] software and  reviewed.  Sagittal and coronal reformatted images were created and  reviewed.  This CT exam was performed using one or more of the following  dose reduction techniques:  automated exposure control, adjustment of  the mA and/or kV according to patient size, and/or use of iterative  reconstruction technique.     FINDINGS:     Arterial input function is optimal.      PERFUSION PARAMETERS:  Ischemic tissue volume (Tmax > 6 sec.): 4 mL.  Infarct core volume (CBF < 30%): 0  mL.  Mismatch (penumbra) volume: 0  mL.  Mismatch ratio: Not applicable.  Location/territory: Left frontal lobe.     COLLATERAL CIRCULATION PARAMETERS:  Volume poor collateral perfusion (Tmax > 10 sec.): 0  mL.  Hypoperfusion index (Tmax >10 sec./Tmax > 6 sec.): 0   CBV Index (rCBV in Tmax > 6 sec. region): 0.6     OTHER:  No additional remarkable findings.       Impression:      Artifact versus ischemia parameters in the left frontal lobe. No core  infarct identified.        This report was finalized on 4/27/2024 4:46 PM by Dr. Eric Stokes MD.       CT Angiogram Head w AI Analysis of LVO [[id number]] Collected: 04/27/24 1641     Updated: 04/27/24 1645    Narrative:      EXAM:    CT Angiography Head With Intravenous Contrast     EXAM DATE:    4/27/2024 4:08 PM     CLINICAL HISTORY:    Stroke, follow up     TECHNIQUE:    Axial computed tomographic angiography images of the head with  intravenous contrast. LVO analysis was performed with [email or web address] [email or web address] software.   This CT exam was  performed using one or more of the following dose  reduction techniques:  automated exposure control, adjustment of the mA  and/or kV according to patient size, and/or use of iterative  reconstruction technique.    MIP reconstructed images were created and reviewed.     COMPARISON:    No relevant prior studies available.     FINDINGS:    Right internal carotid artery:  Moderate calcifications intracranial  right ICA segments without significant stenosis or occlusion.  No  aneurysm.    Right anterior cerebral artery:  Unremarkable as visualized.  No  occlusion or significant stenosis.  No aneurysm.    Right middle cerebral artery:  Unremarkable as visualized.  No  occlusion or significant stenosis.  No aneurysm.    Right posterior cerebral artery:  Unremarkable as visualized.  No  occlusion or significant stenosis.  No aneurysm.    Right vertebral artery:  Right vertebral artery dominance incidentally  noted.       Left internal carotid artery:  Moderate calcifications of the left  intracranial ICA segments without significant stenosis or occlusion.  No  aneurysm.    Left anterior cerebral artery:  Unremarkable as visualized.  No  occlusion or significant stenosis.  No aneurysm.    Left middle cerebral artery:  Unremarkable as visualized.  No  occlusion or significant stenosis.  No aneurysm.    Left posterior cerebral artery:  Unremarkable as visualized.  No  occlusion or significant stenosis.  No aneurysm.    Left vertebral artery:  Unremarkable as visualized.       Basilar artery:  Unremarkable as visualized.  No occlusion or  significant stenosis.  No aneurysm.       Impression:        Essentially unremarkable CTA of the brain with no large vessel  occlusion.        This report was finalized on 4/27/2024 4:42 PM by Dr. Eric Stokes MD.       CT Angiogram Neck [[id number]] Collected: 04/27/24 1639     Updated: 04/27/24 1643    Narrative:      EXAM:    CT Angiography Neck With Intravenous Contrast     EXAM DATE:     4/27/2024 4:08 PM     CLINICAL HISTORY:    Stroke, follow up     TECHNIQUE:    Axial computed tomographic angiography images of the neck with  intravenous contrast.  This CT exam was performed using one or more of  the following dose reduction techniques:  automated exposure control,  adjustment of the mA and/or kV according to patient size, and/or use of  iterative reconstruction technique.    MIP reconstructed images were created and reviewed.     COMPARISON:    None.     FINDINGS:      VASCULATURE:    Right common carotid artery:  Unremarkable as visualized.  No  occlusion or significant stenosis.  No dissection.    Right internal carotid artery:  Tortuosity right ICA. No significant  stenosis or occlusion.  Mild plaque proximal right ICA without  significant stenosis or occlusion.    Right external carotid artery:  Unremarkable as visualized.  No  occlusion.    Right vertebral artery:  Right vertebral artery dominant system.  No  occlusion or significant stenosis.  No dissection.       Left common carotid artery:  Unremarkable as visualized.  No occlusion  or significant stenosis.  No dissection.    Left internal carotid artery:  Mild calcified plaque proximal left ICA  without significant stenosis or occlusion.    Left external carotid artery:  Unremarkable as visualized.  No  occlusion.    Left vertebral artery:  Unremarkable as visualized.  No occlusion or  significant stenosis.  No dissection.    Aorta:  Four-vessel arch configuration incidentally noted.      NECK:    Bones/joints:  Degenerative changes cervical spine with multilevel  stenosis.    Soft tissues:  Unremarkable as visualized.    Lung apices:  Bilateral upper lobe airspace disease most consistent  with pneumonia with nodular type opacities also present.      CAROTID STENOSIS REFERENCE USING NASCET CRITERIA:    % ICA stenosis = (1 - narrowest ICA diameter/diameter of distal  cervical ICA) x 100.    Mild - <50% stenosis.    Moderate - 50-69%  stenosis.    Severe - 70-94% stenosis.    Near occlusion - 95-99% stenosis.    Occluded - 100% stenosis.       Impression:         1. Minimal plaque both carotid systems. No significant stenosis or  occlusion.  2. Vertebral arteries appear within normal limits with right vertebral  artery dominance noted.  3. Bilateral upper lobe airspace disease most consistent with pneumonia  with nodular type opacities also present.  4. Other nonacute findings above.        This report was finalized on 4/27/2024 4:41 PM by Dr. Eric Stokes MD.       CT Head Without Contrast Stroke Protocol [[id number]] Collected: 04/27/24 1621     Updated: 04/27/24 1624    Narrative:      EXAM:    CT Head Without Intravenous Contrast     EXAM DATE:    4/27/2024 4:08 PM     CLINICAL HISTORY:    Neuro deficit, acute stroke suspected     TECHNIQUE:    Axial computed tomography images of the head/brain without intravenous  contrast.  Sagittal and coronal reformatted images were created and  reviewed.  This CT exam was performed using one or more of the following  dose reduction techniques:  automated exposure control, adjustment of  the mA and/or kV according to patient size, and/or use of iterative  reconstruction technique.     COMPARISON:    No relevant prior studies available.     FINDINGS:    Brain and extra-axial spaces:  Mild cerebral atrophy.  Moderate  chronic small vessel ischemic disease.  No hemorrhage.    Bones/joints:  Unremarkable as visualized.  No acute fracture.    Soft tissues:  Unremarkable as visualized.    Sinuses:  Mild chronic paranasal sinus disease.    Mastoid air cells:  Unremarkable as visualized.  No mastoid effusion.       Impression:      1.  No CT evidence of acute intracranial abnormality.  2.  Moderate chronic small vessel ischemic disease and mild age-related  cerebral atrophy noted.  3.  Mild chronic paranasal sinus disease.        This report was finalized on 4/27/2024 4:21 PM by Dr. Eric Stokes MD.              I have personally reviewed the above radiology results.   ---------------------------------------------------------------------------------------------------------------------      Pertinent Infectious Disease Results          Assessment & Plan      Assessment        ESBL bacteremia  Complicated UTI      Plan      Patient presented to [facility] Emergency Department on 4/27/2024 for confusion, incontinence, fever.  WBC 15.87.  CRP 15.00.  Urinalysis from 4/27/2024 positive culture currently in progress.  Blood cultures from 4/27/2024 2 out of 2 sets positive for ESBL E. coli.  Lactic acid 2.8 on admission.  Chest x-ray from 4/27/2024 small nodular opacities scattered throughout the lungs.  CT of the chest reports constellation of findings compatible with sarcoidosis, small pericardial effusion, CT of the abdomen pelvis, mild to moderate left hydronephrosis with double-J ureteral stent in place, defect in the left renal pelvis is likely due to a small clot from the recent intervention rather than neoplasm, mildly distended gallbladder with numerous calculi.    Based on culture preliminary results we will continue meropenem pharmacy to dose monotherapy for treatment of ESBL bacteremia secondary to urinary source.  Patient will likely require prolonged antibiotic therapy in the setting of stent placement.  We will continue to follow closely and adjust antibiotic therapy as needed.      ANTIMICROBIAL THERAPY    meropenem (MERREM) 1000 mg IVPB in 100 mL NS (VTB)       Again, thank you Dr. Carlos for allowing us to participate in the care of your patient and please feel free to call for any questions you may have.        Code Status:     Code Status and Medical Interventions:   Ordered at: 04/27/24 2127     Code Status (Patient has no pulse and is not breathing):    CPR (Attempt to Resuscitate)     Medical Interventions (Patient has pulse or is breathing):    Full Support         Teresita Alcaraz  APRN  04/28/24  13:36 EDT

## 2024-04-28 NOTE — PROGRESS NOTES
Pharmacy was consulted to dose zosyn and vancomycin for a skin and soft tissue infection and an urinary tract infection. Based on an estimated CrCl of 42mL/min, an extended infusion zosyn dose of 3.375g q8hr and vancomycin 1g q24hr has been ordered following the initial 1500mg vancomycin loading dose. A vancomycin trough will be obtained and the dose will be adjusted as needed to maintain a therapeutic AUC concentration of 400-600mg/L.hr. Thank you for the consult. Pharmacy will continue to follow.     Thank you,   Liz Marin, PharmD  21:39 EDT

## 2024-04-28 NOTE — H&P
Hospitalist History and Physical        Patient Identification  Name: Jamin Julio  Age/Sex: 73 y.o. male  :  1950        MRN: [id number]  Visit Number: [id number]  Admit Date: 2024   PCP: Christophe Jamil PA-C          Chief complaint confusion, fever    History of Present Illness:  Patient is a 73 y.o. male with history of asthma, black lung, arthritis, type II DM, HTN, HLD, GERD, JUANCARLOS and kidney stones who underwent cystoscopy with lithotripsy of obstructing left ureteral stones and left ureteral stent placement. He did well post-op initially and was even feeling well this morning. However, later today he became more confused, urinated on himself and was found to have a fever. He was brought to the ED and initial concerns were for stroke. CT head and CT angiograms head and neck showed no evidence of stroke or LVO. CT cerebral perfusion scan commented on artifact vs ischemia parameters in the left frontal lobe. Neurology reportedly weighed in on patient and felt that confusion was more due to underlying infectious process rather than stroke. Patient was febrile up to 102.9 F in the ED and tachycardic up to 116. He was initially hypertensive as well but this has improved without intervention. Labs showed mild troponin elevation with flat trend, normal BNP, BUN 25, Cr 1.57 (down from 1.77 3 days ago but overall up from 1.20 on ). Glucose elevated at 299. LFTs normal. CRP 5.14, lactate 2.8, procal 0.58, WBC 15.54. hemoglobin stable at 11.7. UA showed positive nitrite, mod leuk esterase, large blood, TNTC RBC and WBC, and 1+ bacteria. Urine and blood cultures are pending. CT chest showed constellation of findings compatible with sarcoid, small pericardial effusion, mild to moderate left hydronephrosis (improved from 24 scan that showed marked left sided hydro and ureteral dilatation secondary to multiple obstructing distal left ureteral stones) with double-J ureteral stent in place, defect  in left renal pelvis likely due to small clot from recent intervention, and mildly distended gallbladder with numerous stones but no other signs of acute cholecystitis (this was mentioned on Ct from 4/26/24 as well). Patient has been admitted for further management of severe sepsis and acute encephalopathy secondary to pyelonephritis.     Review of Systems  Review of Systems   Unable to perform ROS: Mental status change       History  Past Medical History:   Diagnosis Date    Arthritis     Asthma     Black lung disease     Diabetes mellitus     Elevated cholesterol     GERD (gastroesophageal reflux disease)     Hypertension     Kidney stone     Sleep apnea     no c-pap     Past Surgical History:   Procedure Laterality Date    CATARACT EXTRACTION WITH INTRAOCULAR LENS IMPLANT Bilateral     COLONOSCOPY      CYST REMOVAL Right     hip     Family History   Problem Relation Age of Onset    Prostate cancer Father      Social History     Tobacco Use    Smoking status: Never     Passive exposure: Never    Smokeless tobacco: Current     Types: Snuff   Vaping Use    Vaping status: Never Used   Substance Use Topics    Alcohol use: Never    Drug use: Never     Medications Prior to Admission   Medication Sig Dispense Refill Last Dose    albuterol sulfate  (90 Base) MCG/ACT inhaler Inhale 2 puffs Every 4 (Four) Hours As Needed for Shortness of Air.       Aspirin Low Dose 81 MG EC tablet Take 1 tablet by mouth Daily.       budesonide-formoterol (SYMBICORT) 160-4.5 MCG/ACT inhaler Inhale 2 puffs 2 (Two) Times a Day. Rinse mouth after each use       carvedilol (COREG) 3.125 MG tablet Take 1 tablet by mouth 2 (Two) Times a Day With Meals.       glimepiride (AMARYL) 1 MG tablet 1 tablet.       HYDROcodone-acetaminophen (NORCO)  MG per tablet Take 1 tablet by mouth Every 6 (Six) Hours As Needed for Moderate Pain (Pain). 20 tablet 0     Januvia 100 MG tablet 1 tablet.       lactulose (CHRONULAC) 10 GM/15ML solution Take 30  mL by mouth 2 (Two) Times a Day As Needed (for constipation). 237 mL 1     latanoprost (XALATAN) 0.005 % ophthalmic solution INSTILL 1 DROP IN BOTH EYES EVERY DAY IN THE EVENING       levocetirizine (XYZAL) 5 MG tablet Take 1 tablet by mouth every night at bedtime.       linaclotide (LINZESS) 145 MCG capsule capsule Take 1 capsule by mouth Every Morning Before Breakfast. 30 capsule 2     lisinopril (PRINIVIL,ZESTRIL) 20 MG tablet Take 1 tablet by mouth every night at bedtime.       meloxicam (MOBIC) 15 MG tablet        metFORMIN (GLUCOPHAGE) 500 MG tablet take 1 tablet by mouth twice daily with the morning and evening meal       metoprolol tartrate (LOPRESSOR) 25 MG tablet Take  by mouth.       Mirabegron ER (MYRBETRIQ) 25 MG tablet sustained-release 24 hour 24 hr tablet Take 1 tablet by mouth Daily. 30 tablet 2     montelukast (SINGULAIR) 10 MG tablet        omeprazole (priLOSEC) 20 MG capsule TAKE 1 CAPSULE BY MOUTH EVERY DAY 30 MINUTES TO 1 HOUR BEFORE A MEAL       rosuvastatin (CRESTOR) 10 MG tablet Take 1 tablet by mouth Daily.       tamsulosin (FLOMAX) 0.4 MG capsule 24 hr capsule Take 1 capsule by mouth Daily. 90 capsule 3      Allergies:  Patient has no known allergies.    Objective     Vital Signs  Temp:  [102.9 °F (39.4 °C)] 102.9 °F (39.4 °C)  Heart Rate:  [106-116] 116  Resp:  [14] 14  BP: (140-199)/(62-82) 164/77  Body mass index is 25.51 kg/m².    Physical Exam:  Physical Exam  Constitutional:       Comments: Awake but confused, acutely ill in appearance   HENT:      Head: Normocephalic and atraumatic.      Right Ear: External ear normal.      Left Ear: External ear normal.      Nose: Nose normal.      Mouth/Throat:      Mouth: Mucous membranes are moist.      Pharynx: Oropharynx is clear.   Eyes:      Extraocular Movements: Extraocular movements intact.      Conjunctiva/sclera: Conjunctivae normal.      Pupils: Pupils are equal, round, and reactive to light.   Cardiovascular:      Rate and Rhythm:  Regular rhythm. Tachycardia present.      Pulses: Normal pulses.      Heart sounds: Normal heart sounds. No murmur heard.  Pulmonary:      Effort: Pulmonary effort is normal. No respiratory distress.      Breath sounds: Normal breath sounds. No wheezing or rales.   Abdominal:      General: Abdomen is flat. Bowel sounds are normal. There is no distension.      Palpations: Abdomen is soft.      Tenderness: There is no abdominal tenderness. There is left CVA tenderness.   Musculoskeletal:         General: Normal range of motion.      Cervical back: Neck supple. No tenderness.      Right lower leg: No edema.      Left lower leg: No edema.   Lymphadenopathy:      Cervical: No cervical adenopathy.   Skin:     General: Skin is warm and dry.      Capillary Refill: Capillary refill takes less than 2 seconds.      Coloration: Skin is not jaundiced.      Findings: No bruising or lesion.   Neurological:      General: No focal deficit present.      Comments: Awake but confused, oriented to self and place but not year or context   Psychiatric:      Comments: Unable to assess           Results Review:       Lab Results:  Results from last 7 days   Lab Units 04/27/24  1617 04/24/24  1403   WBC 10*3/mm3 15.54* 8.92   HEMOGLOBIN g/dL 11.7* 11.0*   PLATELETS 10*3/mm3 146 168     Results from last 7 days   Lab Units 04/27/24  1703   CRP mg/dL 5.14*     Results from last 7 days   Lab Units 04/27/24  1617 04/24/24  1403   SODIUM mmol/L 136 141   POTASSIUM mmol/L 4.2 4.6   CHLORIDE mmol/L 100 104   CO2 mmol/L 22.1 28.1   BUN mg/dL 25* 24*   CREATININE mg/dL 1.57* 1.77*   CALCIUM mg/dL 9.3 9.7   GLUCOSE mg/dL 299* 143*     Results from last 7 days   Lab Units 04/27/24  1703   MAGNESIUM mg/dL 1.7     Hemoglobin A1C   Date Value Ref Range Status   04/27/2024 7.10 (H) 4.80 - 5.60 % Final     Results from last 7 days   Lab Units 04/27/24  1617   BILIRUBIN mg/dL 0.3   ALK PHOS U/L 85   AST (SGOT) U/L 22   ALT (SGPT) U/L 23     Results from last  7 days   Lab Units 04/27/24 1955 04/27/24  1703 04/27/24  1617   HSTROP T ng/L 24* 26* 25*         Results from last 7 days   Lab Units 04/27/24  1617   INR  1.00           I have reviewed the patient's laboratory results.    Imaging:  Imaging Results (Last 72 Hours)       Procedure Component Value Units Date/Time    CT Chest Without Contrast Diagnostic [[id number]] Collected: 04/27/24 1758     Updated: 04/27/24 1813    Narrative:      CLINICAL HISTORY: COPD suspected, abdominal pain.     COMPARISON: CT of the abdomen and pelvis on 4/26/2024.     TECHNIQUE: Noncontrast CT of the chest, abdomen and pelvis was obtained.   Multiplanar reformats were generated Limited exposure control,  adjustment of the mA and/or KV according to patient size or use of  iterative reconstruction technique was utilized.     FINDINGS:     CT CHEST:     Pulmonary arteries: normal.      Heart and pericardium: Normal heart size.  Pericardial effusion.   Extensive coronary calcifications..     Aorta: normal.     Esophagus: normal.     Lungs and airways: Upper lobe predominant nodules and areas of  consolidation along the bronchovascular bundles with mild volume loss  and scarring.  Subsegmental dependent bibasilar atelectasis is noted.   Central airways are patent.     Pleura: normal.     Lymph nodes: Mediastinal and hilar lymph nodes are calcified.     Musculoskeletal and chest wall: no acute abnormality.     Other: n/a        CT ABDOMEN AND PELVIS:     Hepatobiliary: Gallbladder is moderately distended and contains numerous  calculi.  The liver is normal.     Pancreas: normal.     Spleen: Calcified granulomata are scattered to the otherwise normal  spleen.     Adrenals: normal.      Kidneys, ureters, bladder: Since the prior study, the patient has  undergone placement of a double-J ureteral stent in the left renal  collecting system, with the proximal loop in the upper pole calyx and  the distal loop in the urinary bladder.  The stone seen  on the prior  study are not as well-seen on this exam and may have been removed or not  visible due to the contrast media in the renal collecting system.   Filling defects in the renal pelvis may be due to small clot from a  recent intervention.  Correlate with the intra-procedural retrograde  urogram.  The right kidney and ureter are normal.  Urinary bladder is  mildly trabeculated.     Reproductive: Ag gland is mildly enlarged at 5.6 cm in diameter and  is partially calcified.     GI tract/Bowel: Sigmoid diverticulosis without findings for acute  diverticulitis.  No bowel obstruction.  No pneumatosis or portal venous  gas..     Appendix: normal.     Peritoneum: normal, no free air or fluid.     Vascular: Aortoiliac atherosclerosis.     Lymph nodes: normal.     Musculoskeletal and abdominal wall: Multilevel degenerative changes in  the lumbar spine       Impression:         CT CHEST:  CONSTELLATION OF FINDINGS COMPATIBLE WITH SARCOIDOSIS.     SMALL PERICARDIAL EFFUSION.     CT ABDOMEN AND PELVIS:  MILD TO MODERATE LEFT HYDRONEPHROSIS WITH DOUBLE-J URETERAL STENT IN  PLACE.     DEFECT IN THE LEFT RENAL PELVIS IS LIKELY DUE TO A SMALL CLOT FROM THE  RECENT INTERVENTION RATHER THAN A NEOPLASM.  CORRELATE WITH THE PREVIOUS  INTRAOPERATIVE UROGRAM.     MILDLY DISTENDED GALLBLADDER WITH NUMEROUS CALCULI.  NO ADDITIONAL  FINDINGS TO SUGGEST ACUTE CHOLECYSTITIS.     This report was finalized on 4/27/2024 6:10 PM by Trish Chang MD.       CT Abdomen Pelvis Without Contrast [[id number]] Collected: 04/27/24 1758     Updated: 04/27/24 1813    Narrative:      CLINICAL HISTORY: COPD suspected, abdominal pain.     COMPARISON: CT of the abdomen and pelvis on 4/26/2024.     TECHNIQUE: Noncontrast CT of the chest, abdomen and pelvis was obtained.   Multiplanar reformats were generated Limited exposure control,  adjustment of the mA and/or KV according to patient size or use of  iterative reconstruction technique was utilized.      FINDINGS:     CT CHEST:     Pulmonary arteries: normal.      Heart and pericardium: Normal heart size.  Pericardial effusion.   Extensive coronary calcifications..     Aorta: normal.     Esophagus: normal.     Lungs and airways: Upper lobe predominant nodules and areas of  consolidation along the bronchovascular bundles with mild volume loss  and scarring.  Subsegmental dependent bibasilar atelectasis is noted.   Central airways are patent.     Pleura: normal.     Lymph nodes: Mediastinal and hilar lymph nodes are calcified.     Musculoskeletal and chest wall: no acute abnormality.     Other: n/a        CT ABDOMEN AND PELVIS:     Hepatobiliary: Gallbladder is moderately distended and contains numerous  calculi.  The liver is normal.     Pancreas: normal.     Spleen: Calcified granulomata are scattered to the otherwise normal  spleen.     Adrenals: normal.      Kidneys, ureters, bladder: Since the prior study, the patient has  undergone placement of a double-J ureteral stent in the left renal  collecting system, with the proximal loop in the upper pole calyx and  the distal loop in the urinary bladder.  The stone seen on the prior  study are not as well-seen on this exam and may have been removed or not  visible due to the contrast media in the renal collecting system.   Filling defects in the renal pelvis may be due to small clot from a  recent intervention.  Correlate with the intra-procedural retrograde  urogram.  The right kidney and ureter are normal.  Urinary bladder is  mildly trabeculated.     Reproductive: Ag gland is mildly enlarged at 5.6 cm in diameter and  is partially calcified.     GI tract/Bowel: Sigmoid diverticulosis without findings for acute  diverticulitis.  No bowel obstruction.  No pneumatosis or portal venous  gas..     Appendix: normal.     Peritoneum: normal, no free air or fluid.     Vascular: Aortoiliac atherosclerosis.     Lymph nodes: normal.     Musculoskeletal and abdominal wall:  Multilevel degenerative changes in  the lumbar spine       Impression:         CT CHEST:  CONSTELLATION OF FINDINGS COMPATIBLE WITH SARCOIDOSIS.     SMALL PERICARDIAL EFFUSION.     CT ABDOMEN AND PELVIS:  MILD TO MODERATE LEFT HYDRONEPHROSIS WITH DOUBLE-J URETERAL STENT IN  PLACE.     DEFECT IN THE LEFT RENAL PELVIS IS LIKELY DUE TO A SMALL CLOT FROM THE  RECENT INTERVENTION RATHER THAN A NEOPLASM.  CORRELATE WITH THE PREVIOUS  INTRAOPERATIVE UROGRAM.     MILDLY DISTENDED GALLBLADDER WITH NUMEROUS CALCULI.  NO ADDITIONAL  FINDINGS TO SUGGEST ACUTE CHOLECYSTITIS.     This report was finalized on 4/27/2024 6:10 PM by Trish Chang MD.       XR Chest 1 View [[id number]] Collected: 04/27/24 1756     Updated: 04/27/24 1759    Narrative:      CLINICAL HISTORY: Altered mental status.     COMPARISON: CT chest 04/27/2024.     TECHNIQUE: Single portable upright AP view of the chest was obtained.     FINDINGS: Small nodular opacities are scattered the lungs.  No pleural  effusion or pneumothorax is seen.  Heart size is mildly enlarged.  No  acute osseous or upper abdominal abnormality is seen.       Impression:         SMALL NODULAR OPACITIES SCATTERED THROUGH THE LUNGS. CT HAS ALREADY BEEN  PERFORMED.     CARDIOMEGALY.           This report was finalized on 4/27/2024 5:57 PM by Trish Chang MD.       CT CEREBRAL PERFUSION WITH & WITHOUT CONTRAST [[id number]] Collected: 04/27/24 1646     Updated: 04/27/24 1648    Narrative:      EXAMINATION: CT CEREBRAL PERFUSION W WO CONTRAST-      CLINICAL INDICATION:Neuro deficit, acute, stroke suspected     COMPARISON: CT, CTA same day     TECHNIQUE: Axial computed tomography images of the brain without and  with intravenous contrast using cerebral perfusion protocol.   Post-processing parametric maps were created using Checkr software and  reviewed.  Sagittal and coronal reformatted images were created and  reviewed.  This CT exam was performed using one or more of the following  dose  reduction techniques:  automated exposure control, adjustment of  the mA and/or kV according to patient size, and/or use of iterative  reconstruction technique.     FINDINGS:     Arterial input function is optimal.      PERFUSION PARAMETERS:  Ischemic tissue volume (Tmax > 6 sec.): 4 mL.  Infarct core volume (CBF < 30%): 0  mL.  Mismatch (penumbra) volume: 0  mL.  Mismatch ratio: Not applicable.  Location/territory: Left frontal lobe.     COLLATERAL CIRCULATION PARAMETERS:  Volume poor collateral perfusion (Tmax > 10 sec.): 0  mL.  Hypoperfusion index (Tmax >10 sec./Tmax > 6 sec.): 0   CBV Index (rCBV in Tmax > 6 sec. region): 0.6     OTHER:  No additional remarkable findings.       Impression:      Artifact versus ischemia parameters in the left frontal lobe. No core  infarct identified.        This report was finalized on 4/27/2024 4:46 PM by Dr. Eric Stokes MD.       CT Angiogram Head w AI Analysis of LVO [[id number]] Collected: 04/27/24 1641     Updated: 04/27/24 1645    Narrative:      EXAM:    CT Angiography Head With Intravenous Contrast     EXAM DATE:    4/27/2024 4:08 PM     CLINICAL HISTORY:    Stroke, follow up     TECHNIQUE:    Axial computed tomographic angiography images of the head with  intravenous contrast. LVO analysis was performed with [email or web address] software.   This CT exam was performed using one or more of the following dose  reduction techniques:  automated exposure control, adjustment of the mA  and/or kV according to patient size, and/or use of iterative  reconstruction technique.    MIP reconstructed images were created and reviewed.     COMPARISON:    No relevant prior studies available.     FINDINGS:    Right internal carotid artery:  Moderate calcifications intracranial  right ICA segments without significant stenosis or occlusion.  No  aneurysm.    Right anterior cerebral artery:  Unremarkable as visualized.  No  occlusion or significant stenosis.  No aneurysm.    Right middle cerebral  artery:  Unremarkable as visualized.  No  occlusion or significant stenosis.  No aneurysm.    Right posterior cerebral artery:  Unremarkable as visualized.  No  occlusion or significant stenosis.  No aneurysm.    Right vertebral artery:  Right vertebral artery dominance incidentally  noted.       Left internal carotid artery:  Moderate calcifications of the left  intracranial ICA segments without significant stenosis or occlusion.  No  aneurysm.    Left anterior cerebral artery:  Unremarkable as visualized.  No  occlusion or significant stenosis.  No aneurysm.    Left middle cerebral artery:  Unremarkable as visualized.  No  occlusion or significant stenosis.  No aneurysm.    Left posterior cerebral artery:  Unremarkable as visualized.  No  occlusion or significant stenosis.  No aneurysm.    Left vertebral artery:  Unremarkable as visualized.       Basilar artery:  Unremarkable as visualized.  No occlusion or  significant stenosis.  No aneurysm.       Impression:        Essentially unremarkable CTA of the brain with no large vessel  occlusion.        This report was finalized on 4/27/2024 4:42 PM by Dr. Eric Stokes MD.       CT Angiogram Neck [[id number]] Collected: 04/27/24 1639     Updated: 04/27/24 1643    Narrative:      EXAM:    CT Angiography Neck With Intravenous Contrast     EXAM DATE:    4/27/2024 4:08 PM     CLINICAL HISTORY:    Stroke, follow up     TECHNIQUE:    Axial computed tomographic angiography images of the neck with  intravenous contrast.  This CT exam was performed using one or more of  the following dose reduction techniques:  automated exposure control,  adjustment of the mA and/or kV according to patient size, and/or use of  iterative reconstruction technique.    MIP reconstructed images were created and reviewed.     COMPARISON:    None.     FINDINGS:      VASCULATURE:    Right common carotid artery:  Unremarkable as visualized.  No  occlusion or significant stenosis.  No dissection.     Right internal carotid artery:  Tortuosity right ICA. No significant  stenosis or occlusion.  Mild plaque proximal right ICA without  significant stenosis or occlusion.    Right external carotid artery:  Unremarkable as visualized.  No  occlusion.    Right vertebral artery:  Right vertebral artery dominant system.  No  occlusion or significant stenosis.  No dissection.       Left common carotid artery:  Unremarkable as visualized.  No occlusion  or significant stenosis.  No dissection.    Left internal carotid artery:  Mild calcified plaque proximal left ICA  without significant stenosis or occlusion.    Left external carotid artery:  Unremarkable as visualized.  No  occlusion.    Left vertebral artery:  Unremarkable as visualized.  No occlusion or  significant stenosis.  No dissection.    Aorta:  Four-vessel arch configuration incidentally noted.      NECK:    Bones/joints:  Degenerative changes cervical spine with multilevel  stenosis.    Soft tissues:  Unremarkable as visualized.    Lung apices:  Bilateral upper lobe airspace disease most consistent  with pneumonia with nodular type opacities also present.      CAROTID STENOSIS REFERENCE USING NASCET CRITERIA:    % ICA stenosis = (1 - narrowest ICA diameter/diameter of distal  cervical ICA) x 100.    Mild - <50% stenosis.    Moderate - 50-69% stenosis.    Severe - 70-94% stenosis.    Near occlusion - 95-99% stenosis.    Occluded - 100% stenosis.       Impression:         1. Minimal plaque both carotid systems. No significant stenosis or  occlusion.  2. Vertebral arteries appear within normal limits with right vertebral  artery dominance noted.  3. Bilateral upper lobe airspace disease most consistent with pneumonia  with nodular type opacities also present.  4. Other nonacute findings above.        This report was finalized on 4/27/2024 4:41 PM by Dr. Eric Stokes MD.       CT Head Without Contrast Stroke Protocol [[id number]] Collected: 04/27/24 6959      Updated: 04/27/24 1624    Narrative:      EXAM:    CT Head Without Intravenous Contrast     EXAM DATE:    4/27/2024 4:08 PM     CLINICAL HISTORY:    Neuro deficit, acute stroke suspected     TECHNIQUE:    Axial computed tomography images of the head/brain without intravenous  contrast.  Sagittal and coronal reformatted images were created and  reviewed.  This CT exam was performed using one or more of the following  dose reduction techniques:  automated exposure control, adjustment of  the mA and/or kV according to patient size, and/or use of iterative  reconstruction technique.     COMPARISON:    No relevant prior studies available.     FINDINGS:    Brain and extra-axial spaces:  Mild cerebral atrophy.  Moderate  chronic small vessel ischemic disease.  No hemorrhage.    Bones/joints:  Unremarkable as visualized.  No acute fracture.    Soft tissues:  Unremarkable as visualized.    Sinuses:  Mild chronic paranasal sinus disease.    Mastoid air cells:  Unremarkable as visualized.  No mastoid effusion.       Impression:      1.  No CT evidence of acute intracranial abnormality.  2.  Moderate chronic small vessel ischemic disease and mild age-related  cerebral atrophy noted.  3.  Mild chronic paranasal sinus disease.        This report was finalized on 4/27/2024 4:21 PM by Dr. Eric Stokes MD.               I have personally reviewed the patient's radiologic imaging.    EKG:   Sinus tachycardia with premature atrial complexes, , QTc 463  Otherwise normal ECG  When compared with ECG of 24-APR-2024 13:05,  premature atrial complexes are now present  Vent. rate has increased BY  42 BPM  Criteria for Septal infarct are no longer present  Nonspecific T wave abnormality, improved in Lateral leads    I have personally reviewed the patient's EKG. No overt ST changes appreciated.     Assessment & Plan     - Severe sepsis, present on admission with fever, tachycardia, leukocytosis, lactic acidosis, acute encephalopathy  and RUI, secondary to pyelonephritis. Infection could have already been developing secondary to obstructing ureteral stones that were removed yesterday. Also could have been introduced via instrumentation from cystoscopy/lithotripsy/ureteral stent placement. Therefore will treat broadly with IV vancomycin and zosyn while awaiting urine and blood culture results. Continue to trend fever curve and inflammatory markers. Continue IV fluid hydration.   - RUI, secondary to obstructing left ureteral stones: creatinine actually slightly better since lithotripsy and ureteral stent placement. Hydronephrosis less pronounced on CT imaging as well. Continue IV fluid hydration and continue to monitor creatinine closely.  - Small pericardial effusion: BNP normal. Evaluate further with ECHO in the morning.   - Incidentally seen gallbladder distention with cholelithiasis but no other signs of acute cholecystitis: LFTs normal. No RUQ tenderness on exam. No further workup planned at this time.   - Asthma/black lung: no respiratory complaints at this time. O2 sat adequate on RA. Lungs fairly clear on exam. CT imaging comments on changes consistent with sarcoid, though black lung could also be contributing to said findings. Continue to monitor respiratory status closely.   - Type II DM: monitor accuchecks, cover with SSI.     DVT Prophylaxis: SCDs    Estimated Length of Stay >2 midnights    I discussed the patient's findings, assessment and plan with the patient, his family at bedside, and ED provider Dr Mccallum.    Patient is high risk due to severe sepsis, left pyelonephritis, acute encephalopathy    Jamin Soto MD  04/27/24  22:12 EDT

## 2024-04-29 ENCOUNTER — APPOINTMENT (OUTPATIENT)
Dept: GENERAL RADIOLOGY | Facility: HOSPITAL | Age: 74
DRG: 854 | End: 2024-04-29
Payer: MEDICARE

## 2024-04-29 LAB
ANION GAP SERPL CALCULATED.3IONS-SCNC: 11.8 MMOL/L (ref 5–15)
BUN SERPL-MCNC: 17 MG/DL (ref 8–23)
BUN/CREAT SERPL: 13.4 (ref 7–25)
CALCIUM SPEC-SCNC: 8.5 MG/DL (ref 8.6–10.5)
CHLORIDE SERPL-SCNC: 106 MMOL/L (ref 98–107)
CO2 SERPL-SCNC: 22.2 MMOL/L (ref 22–29)
CREAT BLDA-MCNC: 1.6 MG/DL (ref 0.6–1.3)
CREAT SERPL-MCNC: 1.27 MG/DL (ref 0.76–1.27)
DEPRECATED RDW RBC AUTO: 46.8 FL (ref 37–54)
EGFRCR SERPLBLD CKD-EPI 2021: 59.3 ML/MIN/1.73
ERYTHROCYTE [DISTWIDTH] IN BLOOD BY AUTOMATED COUNT: 14.1 % (ref 12.3–15.4)
GLUCOSE BLDC GLUCOMTR-MCNC: 125 MG/DL (ref 70–130)
GLUCOSE BLDC GLUCOMTR-MCNC: 151 MG/DL (ref 70–130)
GLUCOSE BLDC GLUCOMTR-MCNC: 166 MG/DL (ref 70–130)
GLUCOSE BLDC GLUCOMTR-MCNC: 203 MG/DL (ref 70–130)
GLUCOSE SERPL-MCNC: 153 MG/DL (ref 65–99)
HCT VFR BLD AUTO: 34.1 % (ref 37.5–51)
HGB BLD-MCNC: 10.7 G/DL (ref 13–17.7)
MCH RBC QN AUTO: 28.3 PG (ref 26.6–33)
MCHC RBC AUTO-ENTMCNC: 31.4 G/DL (ref 31.5–35.7)
MCV RBC AUTO: 90.2 FL (ref 79–97)
PLATELET # BLD AUTO: 128 10*3/MM3 (ref 140–450)
PMV BLD AUTO: 11.8 FL (ref 6–12)
POTASSIUM SERPL-SCNC: 3.7 MMOL/L (ref 3.5–5.2)
RBC # BLD AUTO: 3.78 10*6/MM3 (ref 4.14–5.8)
SODIUM SERPL-SCNC: 140 MMOL/L (ref 136–145)
WBC NRBC COR # BLD AUTO: 15.01 10*3/MM3 (ref 3.4–10.8)

## 2024-04-29 PROCEDURE — 85027 COMPLETE CBC AUTOMATED: CPT | Performed by: STUDENT IN AN ORGANIZED HEALTH CARE EDUCATION/TRAINING PROGRAM

## 2024-04-29 PROCEDURE — 25010000002 MEROPENEM PER 100 MG

## 2024-04-29 PROCEDURE — 63710000001 INSULIN LISPRO (HUMAN) PER 5 UNITS: Performed by: HOSPITALIST

## 2024-04-29 PROCEDURE — 94799 UNLISTED PULMONARY SVC/PX: CPT

## 2024-04-29 PROCEDURE — 74230 X-RAY XM SWLNG FUNCJ C+: CPT

## 2024-04-29 PROCEDURE — 25010000002 ENOXAPARIN PER 10 MG: Performed by: STUDENT IN AN ORGANIZED HEALTH CARE EDUCATION/TRAINING PROGRAM

## 2024-04-29 PROCEDURE — 94640 AIRWAY INHALATION TREATMENT: CPT

## 2024-04-29 PROCEDURE — 25810000003 SODIUM CHLORIDE 0.9 % SOLUTION: Performed by: HOSPITALIST

## 2024-04-29 PROCEDURE — 92611 MOTION FLUOROSCOPY/SWALLOW: CPT | Performed by: SPEECH-LANGUAGE PATHOLOGIST

## 2024-04-29 PROCEDURE — 82948 REAGENT STRIP/BLOOD GLUCOSE: CPT

## 2024-04-29 PROCEDURE — 94761 N-INVAS EAR/PLS OXIMETRY MLT: CPT

## 2024-04-29 PROCEDURE — 63710000001 INSULIN GLARGINE PER 5 UNITS: Performed by: STUDENT IN AN ORGANIZED HEALTH CARE EDUCATION/TRAINING PROGRAM

## 2024-04-29 PROCEDURE — 74230 X-RAY XM SWLNG FUNCJ C+: CPT | Performed by: RADIOLOGY

## 2024-04-29 PROCEDURE — 99232 SBSQ HOSP IP/OBS MODERATE 35: CPT | Performed by: NURSE PRACTITIONER

## 2024-04-29 PROCEDURE — 92610 EVALUATE SWALLOWING FUNCTION: CPT | Performed by: SPEECH-LANGUAGE PATHOLOGIST

## 2024-04-29 PROCEDURE — 99232 SBSQ HOSP IP/OBS MODERATE 35: CPT | Performed by: STUDENT IN AN ORGANIZED HEALTH CARE EDUCATION/TRAINING PROGRAM

## 2024-04-29 PROCEDURE — 25010000002 ERTAPENEM PER 500 MG: Performed by: NURSE PRACTITIONER

## 2024-04-29 PROCEDURE — 97162 PT EVAL MOD COMPLEX 30 MIN: CPT

## 2024-04-29 PROCEDURE — 80048 BASIC METABOLIC PNL TOTAL CA: CPT | Performed by: STUDENT IN AN ORGANIZED HEALTH CARE EDUCATION/TRAINING PROGRAM

## 2024-04-29 RX ORDER — LUBIPROSTONE 8 UG/1
8 CAPSULE ORAL 2 TIMES DAILY
Status: DISCONTINUED | OUTPATIENT
Start: 2024-04-29 | End: 2024-05-01 | Stop reason: HOSPADM

## 2024-04-29 RX ORDER — PANTOPRAZOLE SODIUM 40 MG/1
40 TABLET, DELAYED RELEASE ORAL
Status: DISCONTINUED | OUTPATIENT
Start: 2024-04-29 | End: 2024-05-01 | Stop reason: HOSPADM

## 2024-04-29 RX ORDER — ENOXAPARIN SODIUM 100 MG/ML
40 INJECTION SUBCUTANEOUS NIGHTLY
Status: DISCONTINUED | OUTPATIENT
Start: 2024-04-29 | End: 2024-05-01 | Stop reason: HOSPADM

## 2024-04-29 RX ORDER — ALBUTEROL SULFATE 2.5 MG/3ML
2.5 SOLUTION RESPIRATORY (INHALATION) EVERY 4 HOURS PRN
Status: DISCONTINUED | OUTPATIENT
Start: 2024-04-29 | End: 2024-05-01 | Stop reason: HOSPADM

## 2024-04-29 RX ORDER — OXYBUTYNIN CHLORIDE 5 MG/1
5 TABLET, EXTENDED RELEASE ORAL DAILY
Status: DISCONTINUED | OUTPATIENT
Start: 2024-04-29 | End: 2024-05-01 | Stop reason: HOSPADM

## 2024-04-29 RX ORDER — CARVEDILOL 3.12 MG/1
3.12 TABLET ORAL 2 TIMES DAILY WITH MEALS
Status: DISCONTINUED | OUTPATIENT
Start: 2024-04-29 | End: 2024-05-01 | Stop reason: HOSPADM

## 2024-04-29 RX ORDER — ROSUVASTATIN CALCIUM 10 MG/1
10 TABLET, COATED ORAL DAILY
Status: DISCONTINUED | OUTPATIENT
Start: 2024-04-29 | End: 2024-05-01 | Stop reason: HOSPADM

## 2024-04-29 RX ORDER — MONTELUKAST SODIUM 10 MG/1
10 TABLET ORAL NIGHTLY
Status: DISCONTINUED | OUTPATIENT
Start: 2024-04-29 | End: 2024-05-01 | Stop reason: HOSPADM

## 2024-04-29 RX ORDER — ASPIRIN 81 MG/1
81 TABLET ORAL DAILY
Status: DISCONTINUED | OUTPATIENT
Start: 2024-04-29 | End: 2024-05-01 | Stop reason: HOSPADM

## 2024-04-29 RX ORDER — TAMSULOSIN HYDROCHLORIDE 0.4 MG/1
0.4 CAPSULE ORAL DAILY
Status: DISCONTINUED | OUTPATIENT
Start: 2024-04-29 | End: 2024-05-01 | Stop reason: HOSPADM

## 2024-04-29 RX ORDER — LISINOPRIL 10 MG/1
20 TABLET ORAL
Status: DISCONTINUED | OUTPATIENT
Start: 2024-04-29 | End: 2024-05-01 | Stop reason: HOSPADM

## 2024-04-29 RX ORDER — CETIRIZINE HYDROCHLORIDE 10 MG/1
10 TABLET ORAL DAILY
Status: DISCONTINUED | OUTPATIENT
Start: 2024-04-29 | End: 2024-05-01 | Stop reason: HOSPADM

## 2024-04-29 RX ORDER — LATANOPROST 50 UG/ML
1 SOLUTION/ DROPS OPHTHALMIC DAILY
Status: DISCONTINUED | OUTPATIENT
Start: 2024-04-29 | End: 2024-05-01 | Stop reason: HOSPADM

## 2024-04-29 RX ORDER — BUDESONIDE AND FORMOTEROL FUMARATE DIHYDRATE 160; 4.5 UG/1; UG/1
2 AEROSOL RESPIRATORY (INHALATION) 2 TIMES DAILY
Status: DISCONTINUED | OUTPATIENT
Start: 2024-04-29 | End: 2024-05-01 | Stop reason: HOSPADM

## 2024-04-29 RX ORDER — MELATONIN
1000 DAILY
Status: DISCONTINUED | OUTPATIENT
Start: 2024-04-29 | End: 2024-05-01 | Stop reason: HOSPADM

## 2024-04-29 RX ORDER — FINASTERIDE 5 MG/1
5 TABLET, FILM COATED ORAL DAILY
Status: DISCONTINUED | OUTPATIENT
Start: 2024-04-29 | End: 2024-05-01 | Stop reason: HOSPADM

## 2024-04-29 RX ADMIN — LUBIPROSTONE 8 MCG: 8 CAPSULE, GELATIN COATED ORAL at 21:36

## 2024-04-29 RX ADMIN — ACETAMINOPHEN 650 MG: 325 TABLET ORAL at 02:55

## 2024-04-29 RX ADMIN — INSULIN GLARGINE 5 UNITS: 100 INJECTION, SOLUTION SUBCUTANEOUS at 21:49

## 2024-04-29 RX ADMIN — FINASTERIDE 5 MG: 5 TABLET, FILM COATED ORAL at 15:28

## 2024-04-29 RX ADMIN — PANTOPRAZOLE SODIUM 40 MG: 40 TABLET, DELAYED RELEASE ORAL at 17:29

## 2024-04-29 RX ADMIN — MONTELUKAST SODIUM 10 MG: 10 TABLET, COATED ORAL at 21:35

## 2024-04-29 RX ADMIN — ROSUVASTATIN CALCIUM 10 MG: 10 TABLET, FILM COATED ORAL at 14:42

## 2024-04-29 RX ADMIN — CARVEDILOL 3.12 MG: 3.12 TABLET, FILM COATED ORAL at 14:42

## 2024-04-29 RX ADMIN — INSULIN LISPRO 2 UNITS: 100 INJECTION, SOLUTION INTRAVENOUS; SUBCUTANEOUS at 21:49

## 2024-04-29 RX ADMIN — ASPIRIN 81 MG: 81 TABLET, COATED ORAL at 14:42

## 2024-04-29 RX ADMIN — Medication 10 ML: at 21:35

## 2024-04-29 RX ADMIN — MEROPENEM 1000 MG: 1 INJECTION, POWDER, FOR SOLUTION INTRAVENOUS at 06:05

## 2024-04-29 RX ADMIN — LATANOPROST 1 DROP: 50 SOLUTION OPHTHALMIC at 15:28

## 2024-04-29 RX ADMIN — TAMSULOSIN HYDROCHLORIDE 0.4 MG: 0.4 CAPSULE ORAL at 14:43

## 2024-04-29 RX ADMIN — BUDESONIDE AND FORMOTEROL FUMARATE DIHYDRATE 2 PUFF: 160; 4.5 AEROSOL RESPIRATORY (INHALATION) at 10:08

## 2024-04-29 RX ADMIN — LUBIPROSTONE 8 MCG: 8 CAPSULE, GELATIN COATED ORAL at 14:43

## 2024-04-29 RX ADMIN — SODIUM CHLORIDE 100 ML/HR: 9 INJECTION, SOLUTION INTRAVENOUS at 17:28

## 2024-04-29 RX ADMIN — Medication 1000 UNITS: at 14:41

## 2024-04-29 RX ADMIN — OXYBUTYNIN CHLORIDE 5 MG: 5 TABLET, EXTENDED RELEASE ORAL at 14:42

## 2024-04-29 RX ADMIN — LISINOPRIL 20 MG: 10 TABLET ORAL at 14:43

## 2024-04-29 RX ADMIN — CARVEDILOL 3.12 MG: 3.12 TABLET, FILM COATED ORAL at 17:28

## 2024-04-29 RX ADMIN — ENOXAPARIN SODIUM 40 MG: 40 INJECTION SUBCUTANEOUS at 21:35

## 2024-04-29 RX ADMIN — ERTAPENEM SODIUM 1000 MG: 1 INJECTION, POWDER, LYOPHILIZED, FOR SOLUTION INTRAMUSCULAR; INTRAVENOUS at 17:26

## 2024-04-29 RX ADMIN — BUDESONIDE AND FORMOTEROL FUMARATE DIHYDRATE 2 PUFF: 160; 4.5 AEROSOL RESPIRATORY (INHALATION) at 18:28

## 2024-04-29 RX ADMIN — CETIRIZINE HYDROCHLORIDE 10 MG: 10 TABLET, FILM COATED ORAL at 14:42

## 2024-04-29 RX ADMIN — INSULIN LISPRO 4 UNITS: 100 INJECTION, SOLUTION INTRAVENOUS; SUBCUTANEOUS at 17:29

## 2024-04-29 RX ADMIN — SODIUM CHLORIDE 100 ML/HR: 9 INJECTION, SOLUTION INTRAVENOUS at 06:05

## 2024-04-29 RX ADMIN — INSULIN LISPRO 2 UNITS: 100 INJECTION, SOLUTION INTRAVENOUS; SUBCUTANEOUS at 08:18

## 2024-04-29 NOTE — THERAPY EVALUATION
"Acute Care - Speech Language Pathology   Swallow Initial Evaluation [facility]     Patient Name: Jamin Julio  : 1950  MRN: [id number]  Today's Date: 2024              Admit Date: 2024  Jamin Julio  was seen at bedside this AM on to assess safety/efficacy of swallowing fnx, determine safest/least restrictive diet tolerance.     Social History     Socioeconomic History    Marital status:    Tobacco Use    Smoking status: Never     Passive exposure: Never    Smokeless tobacco: Current     Types: Snuff   Vaping Use    Vaping status: Never Used   Substance and Sexual Activity    Alcohol use: Never    Drug use: Never    Sexual activity: Defer      Per report: \"Patient is a 73 y.o. male with history of asthma, black lung, arthritis, type II DM, HTN, HLD, GERD, JUANCARLOS and kidney stones who underwent cystoscopy with lithotripsy of obstructing left ureteral stones and left ureteral stent placement. He did well post-op initially and was even feeling well this morning. However, later today he became more confused, urinated on himself and was found to have a fever. He was brought to the ED and initial concerns were for stroke. CT head and CT angiograms head and neck showed no evidence of stroke or LVO. CT cerebral perfusion scan commented on artifact vs ischemia parameters in the left frontal lobe. Neurology reportedly weighed in on patient and felt that confusion was more due to underlying infectious process rather than stroke. Patient was febrile up to 102.9 F in the ED and tachycardic up to 116. He was initially hypertensive as well but this has improved without intervention. Labs showed mild troponin elevation with flat trend, normal BNP, BUN 25, Cr 1.57 (down from 1.77 3 days ago but overall up from 1.20 on ). Glucose elevated at 299. LFTs normal. CRP 5.14, lactate 2.8, procal 0.58, WBC 15.54. hemoglobin stable at 11.7. UA showed positive nitrite, mod leuk esterase, large blood, TNTC RBC and " "WBC, and 1+ bacteria. Urine and blood cultures are pending. CT chest showed constellation of findings compatible with sarcoid, small pericardial effusion, mild to moderate left hydronephrosis (improved from 4/26/24 scan that showed marked left sided hydro and ureteral dilatation secondary to multiple obstructing distal left ureteral stones) with double-J ureteral stent in place, defect in left renal pelvis likely due to small clot from recent intervention, and mildly distended gallbladder with numerous stones but no other signs of acute cholecystitis (this was mentioned on Ct from 4/26/24 as well). Patient has been admitted for further management of severe sepsis and acute encephalopathy secondary to pyelonephritis.\"    Imaging:    Study Result    Narrative & Impression   CLINICAL HISTORY: Altered mental status.     COMPARISON: CT chest 04/27/2024.     TECHNIQUE: Single portable upright AP view of the chest was obtained.     FINDINGS: Small nodular opacities are scattered the lungs.  No pleural  effusion or pneumothorax is seen.  Heart size is mildly enlarged.  No  acute osseous or upper abdominal abnormality is seen.     IMPRESSION:     SMALL NODULAR OPACITIES SCATTERED THROUGH THE LUNGS. CT HAS ALREADY BEEN  PERFORMED.     CARDIOMEGALY.           This report was finalized on 4/27/2024 5:57 PM by Trish Chang MD.     Labs:  Sodium  140  04/29 0258  Potassium  3.7  04/29 0258  Chloride  106  04/29 0258  CO2  22.2  04/29 0258  BUN  17  04/29 0258  Creatinine  1.27  04/29 0258  Glucose  125  04/29 1112  Hemoglobin  10.7  04/29 0258  Hematocrit  34.1  04/29 0258  WBC  15.01  04/29 0258  Platelets  128  04/29 0258  PTT  35.2  04/27 1617  Protime  13.7  04/27 1617  INR  1.00  04/27 1617  ABO Type  A  04/27 1700  RH type  Positive  Diet Orders (active) (From admission, onward)       Start     Ordered    04/29/24 0328  NPO Diet NPO Type: Strict NPO  Diet Effective Now        Comments: Should Remain in Effect Until a " Complete SLP Evaluation Occurs & a Superceding Order is Received    04/29/24 0329                  Pt observed on RA.    Pt failed RN dysphagia screen with coughing noted.     Patient was positioned upright and centered in bed to accept multiple po presentations of ice chips, solid cracker, puree, and thin liquids via spoon, cup, and straw.      Facial/oral structures were symmetrical upon observation. Lingual protrusion revealed no deviation. Oral mucosa were moist, pink, and clean. Secretions were clear, thin, and well controlled. OROM/JACQUES was wfl to imitate oral postures. Gag is not assessed. Volitional cough was intact w/ adequate  intensity, clear in quality, non-productive. Voice was weak in intensity, clear in quality w/ intelligible speech.    Upon po presentations, adequate bolus anticipation and acceptance w/ good labial seal for bolus clearance via spoon bowl, cup rim stability and suction via straw. Bolus formation, manipulation and control were wfl w/ rotary mastication pattern. A-p transit was timely w/o significant oral residue appreciated. No overt s/s aspiration before the swallow.      Pharyngeal swallow was delayed w/ weak hyolaryngeal elevation per palpation. Pt with wet vocal quality, throat clearing, and coughing noted post swallow with thin liquids via straw and cup. Pt with no s/s of aspiration with solids.     Impression: Patient with s/s of aspiration evidenced with thin liquids. Pt will most benefit from continued NPO status pending [facility] tentatively this date to determine pharyngeal function, r/o aspiration.       Visit Dx:   No diagnosis found.  Patient Active Problem List   Diagnosis    Nephrolithiasis    Bladder calculi    Benign prostatic hyperplasia    Cholelithiasis without obstruction    Chronic constipation    Type 2 diabetes mellitus without complication    Elevated prostate specific antigen (PSA)    Pyelonephritis     Past Medical History:   Diagnosis Date    Arthritis     Asthma      Black lung disease     Diabetes mellitus     Elevated cholesterol     GERD (gastroesophageal reflux disease)     Hypertension     Kidney stone     Sleep apnea     no c-pap     Past Surgical History:   Procedure Laterality Date    CATARACT EXTRACTION WITH INTRAOCULAR LENS IMPLANT Bilateral     COLONOSCOPY      CYST REMOVAL Right     hip       SLP Recommendation and Plan   1. Continue NPO.   2. Medications per MD discretion.   3. IVAN precautions.  4. Oral care protocol.  5. MBS to determine pharyngeal function.    D/w patient results and recommendations w/ verbal agreement.    D/w RN results and recommendations w/ verbal agreement.    Thank you for allowing me to participate in the care of your patient-  Dotty Cheung M.A., CCC-SLP       EDUCATION  The patient has been educated in the following areas:   Dysphagia (Swallowing Impairment) Oral Care/Hydration NPO rationale.        Time Calculation:       Therapy Charges for Today       Code Description Service Date Service Provider Modifiers Qty    64382841180  ST EVAL ORAL PHARYNG SWALLOW 4 4/29/2024 Dotty Cheung MA,CCC-SLP GN 1            Dotty Cheung MA,CCC-SLP  4/29/2024

## 2024-04-29 NOTE — THERAPY EVALUATION
Acute Care - Physical Therapy Initial Evaluation   [facility]     Patient Name: Jamin Julio  : 1950  MRN: [id number]  Today's Date: 2024   Onset of Illness/Injury or Date of Surgery: 24  Visit Dx:   No diagnosis found.  Patient Active Problem List   Diagnosis    Nephrolithiasis    Bladder calculi    Benign prostatic hyperplasia    Cholelithiasis without obstruction    Chronic constipation    Type 2 diabetes mellitus without complication    Elevated prostate specific antigen (PSA)    Pyelonephritis     Past Medical History:   Diagnosis Date    Arthritis     Asthma     Black lung disease     Diabetes mellitus     Elevated cholesterol     GERD (gastroesophageal reflux disease)     Hypertension     Kidney stone     Sleep apnea     no c-pap     Past Surgical History:   Procedure Laterality Date    CATARACT EXTRACTION WITH INTRAOCULAR LENS IMPLANT Bilateral     COLONOSCOPY      CYST REMOVAL Right     hip     PT Assessment (Last 12 Hours)       PT Evaluation and Treatment       Row Name 24 1321          Physical Therapy Time and Intention    Document Type evaluation  -     Mode of Treatment physical therapy  -     Patient Effort good  -       Row Name 24 1321          General Information    Patient Profile Reviewed yes  -     Onset of Illness/Injury or Date of Surgery 24  -     Referring Physician Breanna  -     Patient Observations alert;cooperative;agree to therapy  -     Patient/Family/Caregiver Comments/Observations Pt's wife present during evaluation  -     Prior Level of Function independent:;all household mobility;community mobility;gait;transfer;bed mobility  -     Equipment Currently Used at Home none  -     Existing Precautions/Restrictions fall  -     Limitations/Impairments safety/cognitive  -       Row Name 24 1321          Previous Level of Function/Home Environm    Bed Mobility, Premorbid Functional Level independent  -     Transfers,  Premorbid Functional Level independent  -     Household Ambulation, Premorbid Functional Level independent  -     Stairs, Premorbid Functional Level independent  -     Community Ambulation, Premorbid Functional Level independent  -       Row Name 04/29/24 1321          Living Environment    Current Living Arrangements home  -     Home Accessibility stairs to enter home  -     People in Home spouse  -     Primary Care Provided by self  -       Row Name 04/29/24 1321          Home Main Entrance    Number of Stairs, Main Entrance five  -     Stair Railings, Main Entrance railing on left side (ascending)  -       Row Name 04/29/24 1321          Pain    Pretreatment Pain Rating 0/10 - no pain  -     Pain Location - back  -     Pre/Posttreatment Pain Comment Pt reports no specific pain during evaluation.  He did report some chronic back pain at baseline.  -       Row Name 04/29/24 1321          Cognition    Affect/Mental Status (Cognition) confused  -     Orientation Status (Cognition) oriented to;place;situation;other (see comments)  Not oriented to time  -     Follows Commands (Cognition) follows one-step commands;over 90% accuracy  -     Cognitive Function safety deficit  -     Personal Safety Interventions gait belt;fall prevention program maintained;muscle strengthening facilitated;nonskid shoes/slippers when out of bed;supervised activity  -       Row Name 04/29/24 1321          Range of Motion (ROM)    Range of Motion ROM is WFL  Pt does have tight HS and reported chronic back pain  -       Row Name 04/29/24 1321          Bed Mobility    Bed Mobility supine-sit;sit-supine  -     Supine-Sit [place] (Bed Mobility) moderate assist (50% patient effort);1 person assist;verbal cues  -     Sit-Supine [place] (Bed Mobility) moderate assist (50% patient effort);1 person assist;verbal cues  -     Bed Mobility, Safety Issues decreased use of arms for  pushing/pulling;decreased use of legs for bridging/pushing  -     Assistive Device (Bed Mobility) bed rails;draw sheet;head of bed elevated  -       Row Name 04/29/24 1321          Transfers    Transfers sit-stand transfer;stand-sit transfer  -       Row Name 04/29/24 1321          Sit-Stand Transfer    Sit-Stand [place] (Transfers) moderate assist (50% patient effort);minimum assist (75% patient effort);1 person assist;verbal cues  -     Assistive Device (Sit-Stand Transfers) walker, front-wheeled;other (see comments)  Moderate assist HHA and Nimo with FWW  -       Row Name 04/29/24 1321          Stand-Sit Transfer    Stand-Sit [place] (Transfers) minimum assist (75% patient effort);1 person assist;verbal cues  -     Assistive Device (Stand-Sit Transfers) walker, front-wheeled  -       Row Name 04/29/24 1321          Gait/Stairs (Locomotion)    [place] Level (Gait) minimum assist (75% patient effort);1 person assist;verbal cues  -     Assistive Device (Gait) walker, front-wheeled  -     Patient was able to Ambulate yes  -     Distance in Feet (Gait) 75  -KP     Pattern (Gait) step-through  -KP     Deviations/Abnormal Patterns (Gait) enrico decreased;festinating/shuffling;gait speed decreased;stride length decreased  -     Bilateral Gait Deviations forward flexed posture;heel strike decreased  -       Row Name 04/29/24 1321          Plan of Care Review    Plan of Care Reviewed With patient;spouse  -     Outcome Evaluation PT evaluation completed.  Patient requires min to moderate assist with mobility using FWW.  He will benefit from continued skilled PT services this admission to improve transfers, gait, balance and endurance and progress towards PLOF.  -       Row Name 04/29/24 1321          Positioning and Restraints    Pre-Treatment Position in bed  -KP     Post Treatment Position bed  -KP     In Bed fowlers;call light within reach;encouraged to call for assist;exit  alarm on;with family/caregiver;side rails up x3;SCD pump applied  Lines in tact and needs in reach  -       Row Name 04/29/24 1321          Therapy Assessment/Plan (PT)    Patient/Family Therapy Goals Statement (PT) Improve to PLOF  -     Functional Level at Time of Evaluation (PT) moderate assist  -     PT Diagnosis (PT) debility  -     Rehab Potential (PT) good, to achieve stated therapy goals  -     Criteria for Skilled Interventions Met (PT) yes;meets criteria;skilled treatment is necessary  -     Therapy Frequency (PT) 2 times/wk  -     Predicted Duration of Therapy Intervention (PT) 2 wks  -     Problem List (PT) problems related to;balance;coordination;mobility;range of motion (ROM);strength;postural control;cognition  -     Activity Limitations Related to Problem List (PT) unable to ambulate safely;unable to transfer safely  -       Row Name 04/29/24 1321          PT Evaluation Complexity    History, PT Evaluation Complexity 3 or more personal factors and/or comorbidities  -     Examination of Body Systems (PT Eval Complexity) total of 3 or more elements  -     Clinical Presentation (PT Evaluation Complexity) evolving  -     Clinical Decision Making (PT Evaluation Complexity) moderate complexity  -     Overall Complexity (PT Evaluation Complexity) moderate complexity  -       Row Name 04/29/24 1321          Physical Therapy Goals    Bed Mobility Goal Selection (PT) bed mobility, PT goal 1  -     Transfer Goal Selection (PT) transfer, PT goal 1  -     Gait Training Goal Selection (PT) gait training, PT goal 1  -       Row Name 04/29/24 1321          Bed Mobility Goal 1 (PT)    Activity/Assistive Device (Bed Mobility Goal 1, PT) sit to supine/supine to sit  -     [place] Level/Cues Needed (Bed Mobility Goal 1, PT) supervision required  -     Time Frame (Bed Mobility Goal 1, PT) long term goal (LTG);by discharge  -       Row Name 04/29/24 1321          Transfer  Goal 1 (PT)    Activity/Assistive Device (Transfer Goal 1, PT) transfers, all;sit-to-stand/stand-to-sit;walker, rolling  -KP     [place] Level/Cues Needed (Transfer Goal 1, PT) supervision required  -KP     Time Frame (Transfer Goal 1, PT) long term goal (LTG);by discharge  -       Row Name 04/29/24 1321          Gait Training Goal 1 (PT)    Activity/Assistive Device (Gait Training Goal 1, PT) gait (walking locomotion);assistive device use;walker, rolling  -KP     [place] Level (Gait Training Goal 1, PT) supervision required  -KP     Distance (Gait Training Goal 1, PT) 200ft  -KP     Time Frame (Gait Training Goal 1, PT) long term goal (LTG);by discharge  -               User Key  (r) = Recorded By, (t) = Taken By, (c) = Cosigned By      Initials Name Provider Type     Marilee Hsieh PT Physical Therapist                      PT Recommendation and Plan  Planned Therapy Interventions (PT): balance training, bed mobility training, gait training, home exercise program, motor coordination training, neuromuscular re-education, patient/family education, postural re-education, stair training, strengthening, stretching, transfer training, ROM (range of motion)  Therapy Frequency (PT): 2 times/wk  Plan of Care Reviewed With: patient, spouse  Outcome Evaluation: PT evaluation completed.  Patient requires min to moderate assist with mobility using FWW.  He will benefit from continued skilled PT services this admission to improve transfers, gait, balance and endurance and progress towards PLOF.       Time Calculation:    PT Charges       Row Name 04/29/24 1338             Time Calculation    PT Received On 05/13/24  [facility]                User Key  (r) = Recorded By, (t) = Taken By, (c) = Cosigned By      Initials Name Provider Type     Marilee Hsieh PT Physical Therapist                  Therapy Charges for Today       Code Description Service Date Service Provider Modifiers Qty    65903584587  PT EVAL MOD  COMPLEXITY 4 4/29/2024 Marilee Hsieh, PT GP 1            PT G-Codes  AM-PAC 6 Clicks Score (PT): 17    Marilee Hsieh, PT  4/29/2024

## 2024-04-29 NOTE — CASE MANAGEMENT/SOCIAL WORK
Discharge Planning Assessment   [facility]     Patient Name: Jamin Julio  MRN: [id number]  Today's Date: 4/29/2024    Admit Date: 4/27/2024    Plan: CM spoke with pt and his wife Alberta at the bedside. Pt lives at home in [facility] with spouse and plans to return at d/c. Pt has a b/p cuff and glucometer via unknown provider. Pt does not have HH and denies any needs. PCP is Christophe Jamil PA-C and he gets rx filled at [facility] in [place]. Pt has [employer] Medicare replacement insurance and denies any issues with copays. Pt's son Chan will transport.  Pt may need IV antibiotics after discharge and his spouse may be able to help with those or he may need to come to oupt clinic. CM will follow.   Discharge Needs Assessment       Row Name 04/29/24 1257       Living Environment    People in Home spouse    Current Living Arrangements home    Primary Care Provided by self    Provides Primary Care For no one    Family Caregiver if Needed spouse    Family Caregiver Names Spouse Latasha Julio [phone] (C) [phone] (H)    Quality of Family Relationships supportive;helpful;involved    Able to Return to Prior Arrangements yes       Resource/Environmental Concerns    Resource/Environmental Concerns none    Transportation Concerns none       Transition Planning    Patient/Family Anticipates Transition to home    Patient/Family Anticipated Services at Transition outpatient care    Transportation Anticipated family or friend will provide       Discharge Needs Assessment    Readmission Within the Last 30 Days no previous admission in last 30 days    Equipment Currently Used at Home glucometer;bp cuff    Concerns to be Addressed discharge planning    Anticipated Changes Related to Illness none    Equipment Needed After Discharge none    Outpatient/Agency/Support Group Needs infusion therapy, outpatient    Patient's Choice of Community Agency(s) Pt does not have HH and denies any needs.                   Discharge Plan        Row Name 04/29/24 1309       Plan    Plan CM spoke with pt and his wife Alberta at the bedside. Pt lives at home in [facility] with spouse and plans to return at d/c. Pt has a b/p cuff and glucometer via unknown provider. Pt does not have HH and denies any needs. PCP is Christophe Jamil PA-C and he gets rx filled at [facility] in [place]. Pt has [employer] Medicare replacement insurance and denies any issues with copays. Pt's son Chan will transport.  Pt may need IV antibiotics after discharge and his spouse may be able to help with those or he may need to come to oupt clinic. CM will follow.              Demographic Summary       Row Name 04/29/24 125       General Information    Admission Type inpatient    Referral Source admission list;case finding    Reason for Consult discharge planning             Nohemi Nelson RN

## 2024-04-29 NOTE — PROGRESS NOTES
PROGRESS NOTE         Patient Identification:  Name:  Jamin Julio  Age:  73 y.o.  Sex:  male  :  1950  MRN:  [id number]  Visit Number:  [id number]  Primary Care Provider:  Christophe Jamil PA-C         LOS: 2 days       ----------------------------------------------------------------------------------------------------------------------  Subjective       Chief Complaints:    Altered Mental Status        Interval History:      Patient resting comfortably in bed this morning.  Family at bedside.  Febrile overnight with Tmax 101.4.  Feels some better today.  Abdomen soft, nontender.  Lungs clear to auscultation bilaterally.  WBC stable at 15.01.  Repeat blood cultures from 2024 currently in process.  Urine culture from 2024 remains in process.    Review of Systems:    Constitutional: no fever, chills and night sweats.  Generalized fatigue.  Eyes: no eye drainage, itching or redness.  HEENT: no mouth sores, dysphagia or nose bleed.  Respiratory: no for shortness of breath, cough or production of sputum.  Cardiovascular: no chest pain, no palpitations, no orthopnea.  Gastrointestinal: no nausea, vomiting or diarrhea. No abdominal pain, hematemesis or rectal bleeding.  Genitourinary: no dysuria or polyuria.  Hematologic/lymphatic: no lymph node abnormalities, no easy bruising or easy bleeding.  Musculoskeletal: no muscle or joint pain.  Skin: No rash and no itching.  Neurological: no loss of consciousness, no seizure, no headache.  Behavioral/Psych: no depression or suicidal ideation.  Endocrine: no hot flashes.  Immunologic: negative.    ----------------------------------------------------------------------------------------------------------------------      Objective       [facility] Meds:  aspirin, 81 mg, Oral, Daily  budesonide-formoterol, 2 puff, Inhalation, BID  carvedilol, 3.125 mg, Oral, BID With Meals  cetirizine, 10 mg, Oral, Daily  cholecalciferol, 1,000 Units, Oral,  Daily  ertapenem, 1,000 mg, Intravenous, Q24H  finasteride, 5 mg, Oral, Daily  insulin lispro, 2-9 Units, Subcutaneous, 4x Daily AC & at Bedtime  latanoprost, 1 drop, Both Eyes, Daily  lisinopril, 20 mg, Oral, Q24H  lubiprostone, 8 mcg, Oral, BID  montelukast, 10 mg, Oral, Nightly  oxybutynin XL, 5 mg, Oral, Daily  pantoprazole, 40 mg, Oral, Q AM  rosuvastatin, 10 mg, Oral, Daily  sodium chloride, 10 mL, Intravenous, Q12H  tamsulosin, 0.4 mg, Oral, Daily      sodium chloride, 100 mL/hr, Last Rate: 100 mL/hr (04/29/24 0605)      ----------------------------------------------------------------------------------------------------------------------    Vital Signs:  Temp:  [98.3 °F (36.8 °C)-101.4 °F (38.6 °C)] 98.4 °F (36.9 °C)  Heart Rate:  [81-99] 86  Resp:  [18-20] 20  BP: (111-161)/(54-73) 161/73  Mean Arterial Pressure (Non-Invasive) for the past 24 hrs (Last 3 readings):   Noninvasive MAP (mmHg)   04/28/24 1459 74   04/28/24 1135 80     SpO2 Percentage    04/28/24 1135 04/28/24 1459 04/29/24 1008   SpO2: 99% 99% 97%     SpO2:  [97 %-99 %] 97 %  on  Flow (L/min):  [2] 2;   Device (Oxygen Therapy): room air    Body mass index is 24.46 kg/m².  Wt Readings from Last 3 Encounters:   04/29/24 70.9 kg (156 lb 3.2 oz)   04/26/24 71.7 kg (158 lb)   04/19/24 72.1 kg (159 lb)        Intake/Output Summary (Last 24 hours) at 4/29/2024 1036  Last data filed at 4/28/2024 2734  Gross per 24 hour   Intake 1430 ml   Output 700 ml   Net 730 ml     NPO Diet NPO Type: Strict NPO  ----------------------------------------------------------------------------------------------------------------------      Physical Exam:    Constitutional:  Well-developed and well-nourished.  No respiratory distress.  Family at bedside.  Febrile overnight.      HENT:  Head: Normocephalic and atraumatic.  Mouth:  Moist mucous membranes.    Eyes:  Conjunctivae and EOM are normal.  No scleral icterus.  Neck:  Neck supple.  No JVD present.    Cardiovascular:   Normal rate, regular rhythm and normal heart sounds with no murmur. No edema.  Pulmonary/Chest:  No respiratory distress, no wheezes, no crackles, with normal breath sounds and good air movement.  Abdominal:  Soft.  Bowel sounds are normal.  No distension and no tenderness.   Musculoskeletal:  No edema, no tenderness, and no deformity.  No swelling or redness of joints.  Neurological:  Alert and oriented to person, place, and time.  No facial droop.  No slurred speech.   Skin:  Skin is warm and dry.  No rash noted.  No pallor.   Psychiatric:  Normal mood and affect.  Behavior is normal.        ----------------------------------------------------------------------------------------------------------------------  Results from last 7 days   Lab Units 04/27/24 1955 04/27/24  1703 04/27/24  1617   HSTROP T ng/L 24* 26* 25*     Results from last 7 days   Lab Units 04/27/24  1703   PROBNP pg/mL 291.2         Results from last 7 days   Lab Units 04/29/24  0258 04/28/24  0203 04/27/24 1955 04/27/24  1703 04/27/24  1617   CRP mg/dL  --  15.00*  --  5.14*  --    LACTATE mmol/L  --   --  1.4 2.8*  --    WBC 10*3/mm3 15.01* 15.87*  --   --  15.54*   HEMOGLOBIN g/dL 10.7* 11.0*  --   --  11.7*   HEMATOCRIT % 34.1* 34.7*  --   --  35.7*   MCV fL 90.2 87.8  --   --  87.9   MCHC g/dL 31.4* 31.7  --   --  32.8   PLATELETS 10*3/mm3 128* 143  --   --  146   INR   --   --   --   --  1.00     Results from last 7 days   Lab Units 04/29/24  0258 04/28/24  0203 04/27/24  1703 04/27/24  1617   SODIUM mmol/L 140 143  --  136   POTASSIUM mmol/L 3.7 4.1  --  4.2   MAGNESIUM mg/dL  --   --  1.7  --    CHLORIDE mmol/L 106 105  --  100   CO2 mmol/L 22.2 25.7  --  22.1   BUN mg/dL 17 19  --  25*   CREATININE mg/dL 1.27 1.62*  --  1.57*   CALCIUM mg/dL 8.5* 9.0  --  9.3   GLUCOSE mg/dL 153* 127*  --  299*   ALBUMIN g/dL  --  3.9  --  4.0   BILIRUBIN mg/dL  --  0.5  --  0.3   ALK PHOS U/L  --  82  --  85   AST (SGOT) U/L  --  19  --  22   ALT  "(SGPT) U/L  --  20  --  23   Estimated Creatinine Clearance: 51.2 mL/min (by C-G formula based on SCr of 1.27 mg/dL).  No results found for: \"AMMONIA\"    Hemoglobin A1C   Date/Time Value Ref Range Status   04/27/2024 1703 7.10 (H) 4.80 - 5.60 % Final     Glucose   Date/Time Value Ref Range Status   04/29/2024 0616 151 (H) 70 - 130 mg/dL Final   04/28/2024 2045 121 70 - 130 mg/dL Final   04/28/2024 1646 205 (H) 70 - 130 mg/dL Final   04/28/2024 1205 120 70 - 130 mg/dL Final   04/28/2024 0641 190 (H) 70 - 130 mg/dL Final   04/27/2024 2327 208 (H) 70 - 130 mg/dL Final   04/27/2024 1603 284 (H) 70 - 130 mg/dL Final     Lab Results   Component Value Date    HGBA1C 7.10 (H) 04/27/2024     Lab Results   Component Value Date    TSH 0.918 04/27/2024    FREET4 1.37 04/27/2024       Blood Culture   Date Value Ref Range Status   04/27/2024 Escherichia coli (C)  Preliminary   04/27/2024 Escherichia coli (C)  Preliminary     Urine Culture   Date Value Ref Range Status   04/27/2024 Culture in progress  Preliminary     No results found for: \"WOUNDCX\"  No results found for: \"STOOLCX\"  No results found for: \"RESPCX\"  Pain Management Panel           No data to display                  ----------------------------------------------------------------------------------------------------------------------  Imaging Results (Last 24 Hours)       ** No results found for the last 24 hours. **            ----------------------------------------------------------------------------------------------------------------------    Pertinent Infectious Disease Results                Assessment/Plan       Assessment       ESBL bacteremia  Complicated UTI      Plan      Patient resting comfortably in bed this morning.  Family at bedside.  Febrile overnight with Tmax 101.4.  Feels some better today.  Abdomen soft, nontender.  Lungs clear to auscultation bilaterally.  WBC stable at 15.01.  Repeat blood cultures from 4/28/2024 currently in process.  Urine " culture from 4/27/2024 remains in process.    Meropenem pharmacy to dose was transitioned to ertapenem 1 g IV every 24 hours monotherapy for treatment of ESBL bacteremia secondary to urinary source.  Patient will likely require prolonged course of IV antibiotic therapy in the setting of stent placement. We will continue to follow closely and adjust antibiotic therapy as needed.      ANTIMICROBIAL THERAPY    ertapenem (INVanz) 1000 mg in 100 mL NS (VTB)     Code Status:   Code Status and Medical Interventions:   Ordered at: 04/27/24 2127     Code Status (Patient has no pulse and is not breathing):    CPR (Attempt to Resuscitate)     Medical Interventions (Patient has pulse or is breathing):    Full Support       KENDRA Cantu  04/29/24  10:36 EDT

## 2024-04-29 NOTE — PLAN OF CARE
Goal Outcome Evaluation:              Outcome Evaluation: Pt resting in bed. Pt did spike a fever of 101.4, prn med given and NP aware. Pt was coughing with sips of water with meds, nurse dysphagia screening was done and pt failed. NPO and SLP order was placed. NP aware. VSS RA

## 2024-04-29 NOTE — PLAN OF CARE
Goal Outcome Evaluation:  Plan of Care Reviewed With: patient           Outcome Evaluation: patient has done much better today walked with PT in hallway no fevers today more alert also up in chair eating supper at this time vss will continue plan of care

## 2024-04-29 NOTE — PROGRESS NOTES
[facility] HOSPITALIST PROGRESS NOTE     Patient Identification:  Name:  Jamin Julio  Age:  73 y.o.  Sex:  male  :  1950  MRN:  [id number]  Visit Number:  [id number]  ROOM: [address] [place] [address]     Primary Care Provider:  Christophe Jamil PA-C    Length of stay in inpatient status:  2    Subjective     Chief Compliant:    Chief Complaint   Patient presents with    Altered Mental Status       History of Presenting Illness: Patient seen and evaluated in follow-up for ESBL bacteremia felt to be of urologic origin given recent obstructing ureteral stone status post stent placement.  Patient time evaluation resting comfortably in bed with no acute complaints.    Objective     Current Hospital Meds:  aspirin, 81 mg, Oral, Daily  budesonide-formoterol, 2 puff, Inhalation, BID  carvedilol, 3.125 mg, Oral, BID With Meals  cetirizine, 10 mg, Oral, Daily  cholecalciferol, 1,000 Units, Oral, Daily  enoxaparin, 40 mg, Subcutaneous, Nightly  ertapenem, 1,000 mg, Intravenous, Q24H  finasteride, 5 mg, Oral, Daily  insulin lispro, 2-9 Units, Subcutaneous, 4x Daily AC & at Bedtime  latanoprost, 1 drop, Both Eyes, Daily  lisinopril, 20 mg, Oral, Q24H  lubiprostone, 8 mcg, Oral, BID  montelukast, 10 mg, Oral, Nightly  oxybutynin XL, 5 mg, Oral, Daily  pantoprazole, 40 mg, Oral, Q AM  rosuvastatin, 10 mg, Oral, Daily  sodium chloride, 10 mL, Intravenous, Q12H  tamsulosin, 0.4 mg, Oral, Daily      sodium chloride, 100 mL/hr, Last Rate: 100 mL/hr (24 1728)      ----------------------------------------------------------------------------------------------------------------------  Vital Signs:  Temp:  [98.4 °F (36.9 °C)-101.4 °F (38.6 °C)] 99.1 °F (37.3 °C)  Heart Rate:  [70-91] 88  Resp:  [18-20] 18  BP: (111-161)/(59-73) 144/68  SpO2:  [96 %-98 %] 98 %  on  Flow (L/min):  [2] 2;   Device (Oxygen Therapy): room air  Body mass index is 24.46 kg/m².      Intake/Output Summary (Last 24 hours) at 2024 193  Last data  filed at 4/28/2024 4454  Gross per 24 hour   Intake 120 ml   Output --   Net 120 ml      ----------------------------------------------------------------------------------------------------------------------  Physical exam:  Constitutional: Elderly adult male resting in bed in no distress.      HENT:  Head:  Normocephalic and atraumatic.  Mouth:  Moist mucous membranes.    Eyes:  Conjunctivae and EOM are normal. No scleral icterus.    Cardiovascular:  Normal rate, regular rhythm and normal heart sounds with no murmur.  Pulmonary/Chest:  No respiratory distress, no wheezes, no crackles, with normal breath sounds and good air movement.  Abdominal:  Soft.  Bowel sounds are normal.  No distension but mild tenderness to palpation in the right lower quadrant bordering on suprapubic region.   Musculoskeletal:  No tenderness, and no deformity.  No red or swollen joints anywhere.   Neurological:  Alert and oriented to person, place, and time.  No cranial nerve deficit.  No tongue deviation.  No facial droop.  No slurred speech. Intact Sensation throughout  Skin:  Skin is warm and dry. No rash or lesion noted. No pallor.   Peripheral vascular:  Pulses in all 4 extremities with no clubbing, no cyanosis, no edema.  Psychiatric: Appropriate mood and affect, pleasant.   ----------------------------------------------------------------------------------------------------------------------  WBC/HGB/HCT/PLT   15.01/10.7/34.1/128 (04/29 0258)  BUN/CREAT/GLUC/ALT/AST/EMBER/LIP    17/1.27/153/--/--/--/-- (04/29 0258)  LYTES - Na/K/Cl/CO2: 140/3.7/106/22.2 (04/29 0258)        Urine Culture   Date Value Ref Range Status   04/27/2024 25,000 CFU/mL Gram Negative Bacilli (A)  Preliminary     Blood Culture   Date Value Ref Range Status   04/28/2024 No growth at 24 hours  Preliminary   04/28/2024 No growth at 24 hours  Preliminary   04/27/2024 Escherichia coli (C)  Preliminary   04/27/2024 Escherichia coli (C)  Preliminary       I have  personally looked at the labs and they are summarized above.  ----------------------------------------------------------------------------------------------------------------------  Detailed radiology reports for the last 24 hours:  FL Video Swallow Single Contrast    Result Date: 4/29/2024      Please see the speech pathologist's report for additional information.   This report was finalized on 4/29/2024 2:37 PM by Dr. Fernando Wise MD.      Adult Transthoracic Echo Complete w/ Color, Spectral and Contrast if necessary per protocol    Addendum Date: 4/28/2024      Left ventricular systolic function is normal. Calculated left ventricular EF = 60.9%   Left ventricular diastolic function is consistent with (grade I) impaired relaxation.   Estimated right ventricular systolic pressure from tricuspid regurgitation is normal (<35 mmHg).   There is a small (<1cm) circumferential pericardial effusion. There is no evidence of cardiac tamponade.    Assessment & Plan      ESBL bacteremia  Acute kidney injury, resolved  Obstructing ureteral stone s/p stent    -Urine culture 25,000 colonies of units of gram-negative bacilli with blood cultures showing E. coli in 2 out of 2 bottles with evidence of ESBL detection on PCR.    -Patient initiated on ertapenem once daily 1 g x 14 days    -Repeat blood cultures thus far with no growth to date at 24 hours    -Patient likely to require midline to complete a 14-day course of-IV antibiotic therapy.    -Given resolution of RUI IV fluids discontinued.    -Consult urology if still inpatient, Thursdays patient scheduled for outpatient removal of stent    Diabetes mellitus type 2    -Continue basal and bolus insulin as needed-pain glycemic control.        Copied text in portions of the note has been reviewed and is accurate as of 04/29/24    VTE Prophylaxis:   Mechanical Order History:        Ordered        04/27/24 2158  Place Sequential Compression Device  Once,   Status:  Canceled             04/27/24 2158  Maintain Sequential Compression Device  Continuous,   Status:  Canceled                          Pharmalogical Order History:        Ordered     Dose Route Frequency Stop    04/29/24 1919  Enoxaparin Sodium (LOVENOX) syringe 40 mg         40 mg SC Nightly --    04/29/24 1917  Pharmacy to Dose enoxaparin (LOVENOX)  Status:  Discontinued        Question:  Indication of use  Answer:  Prophylaxis    -- XX Continuous PRN 04/29/24 1920                    Disposition potentially home in the next 24 to 48 hours.    Justin Aguilar DO  [facility] Hospitalist  04/29/24  19:34 EDT

## 2024-04-29 NOTE — THERAPY EVALUATION
Acute Care - Speech Language Pathology   Swallow Initial Evaluation  Gio  MODIFIED BARIUM SWALLOW STUDY     Patient Name: Jamin Julio  : 1950  MRN: [id number]  Today's Date: 2024  Onset of Illness/Injury or Date of Surgery: 24     Referring Physician: Breanna      Admit Date: 2024    Jamin Julio  presented to the radiology suite this PM  from [address] [place] [address]  to participate in an instrumental MBS to objectively evaluate safety/efficacy of swallowing fnx, determine safest/least restrictive diet.      Social History     Socioeconomic History    Marital status:    Tobacco Use    Smoking status: Never     Passive exposure: Never    Smokeless tobacco: Current     Types: Snuff   Vaping Use    Vaping status: Never Used   Substance and Sexual Activity    Alcohol use: Never    Drug use: Never    Sexual activity: Defer      Imaging:  Study Result    Narrative & Impression   CLINICAL HISTORY: Altered mental status.     COMPARISON: CT chest 2024.     TECHNIQUE: Single portable upright AP view of the chest was obtained.     FINDINGS: Small nodular opacities are scattered the lungs.  No pleural  effusion or pneumothorax is seen.  Heart size is mildly enlarged.  No  acute osseous or upper abdominal abnormality is seen.     IMPRESSION:     SMALL NODULAR OPACITIES SCATTERED THROUGH THE LUNGS. CT HAS ALREADY BEEN  PERFORMED.     CARDIOMEGALY.           This report was finalized on 2024 5:57 PM by Trish Chang MD.     Labs:  Switch View     Sodium  140   0258  Potassium  3.7   0258  Chloride  106   0258  CO2  22.2   0258  BUN  17   0258  Creatinine  1.27   0258  Glucose  125   1112  Hemoglobin  10.7   0258  Hematocrit  34.1   0258  WBC  15.01   0258  Platelets  128   0258  PTT  35.2   1617  Protime  13.7   1617  INR  1.00   1617  ABO Type  A   1700  RH type  Positive  Diet Orders (active) (From admission,  onward)       Start     Ordered    04/29/24 1419  Diet: Regular/House; Fluid Consistency: Thin (IDDSI 0)  Diet Effective Now         04/29/24 1418                Mr. Julio is s/p Clinical Dysphagia Assessment this date.     He is observed on RA.     Risks and benefits of the procedure were explained w/ patient verbalizing understanding/agreement to participate. Proceed per protocol.     Patient was positioned upright and centered in a soft strap supportive chair to accept multiple po presentations of solid cracker, puree, honey thick, nectar thick, and thin liquids via spoon, cup and straw, along w/ whole placebo pill in puree. Patient was able to self provide po trials.      All views are from the lateral plane.     Facial/oral structures were symmetrical upon observation w/o lingual deviation upon protrusion. Oral mucosa were moist, pink and clean. Secretions were clear, thin, and well controlled. OROM/JACQUES was wfl to imitate oral postures. Gag was not assessed. Volitional cough was weak in intensity, clear in quality, nonproductive. Vocal quality was weak in intensity, clear in quality w/ intelligible speech. Patient was a/a and cooperative to participate, follows simple directives, and participates in simple conversational exchanges.     Upon po presentations, adequate bolus anticipation w/ good labial seal for bolus clearance via spoon bowl, cup rim stability and suction via straw.  Bolus formation, manipulation,  and control were weak with lingual pumping noted, delay in a-p transit, mild oral residue post swallow.Tongue base retraction and linguavelar seal were weak w/ premature spillage to the valleculae and pyriforms. No laryngeal penetration or aspiration evidenced before the swallow.     Pharyngeal swallow was delayed w/ weak hyolaryngeal elevation and epiglottic inversion. Pharyngeal contraction was weak w/ diffuse pharyngeal residue. Pt with isolated laryngeal penetration of residue noted.      Attempted  to initiate a-p transit with whole placebo pill. Pt noted w/ gagging/nausea upon swallow. Pt able to complete swallow w/ emesis after the swallow. Full esophageal sweep reveals no mucosal abnormalities.      Impression: Pt presented w/wfl oropharyngeal swallow w/o aspiration across this evaluation.     Visit Dx:   No diagnosis found.  Patient Active Problem List   Diagnosis    Nephrolithiasis    Bladder calculi    Benign prostatic hyperplasia    Cholelithiasis without obstruction    Chronic constipation    Type 2 diabetes mellitus without complication    Elevated prostate specific antigen (PSA)    Pyelonephritis     Past Medical History:   Diagnosis Date    Arthritis     Asthma     Black lung disease     Diabetes mellitus     Elevated cholesterol     GERD (gastroesophageal reflux disease)     Hypertension     Kidney stone     Sleep apnea     no c-pap     Past Surgical History:   Procedure Laterality Date    CATARACT EXTRACTION WITH INTRAOCULAR LENS IMPLANT Bilateral     COLONOSCOPY      CYST REMOVAL Right     hip     SLP Recommendation and Plan      1. Regular consistencies, thin liquids.  2. Medications one at at time, whole in puree/thins.   3. IVAN precautions.   4. Oral care protocol.   No further SLP f/u warranted at this time.     D/w patient results and recommendations w/ verbal understanding and agreement.     D/w RN results and recommendations w/ verbal understanding and agreement.     Thank you for allowing me to participate in the care of your patient-  Dotty Cheung M.A, CCC-SLP      EDUCATION  The patient has been educated in the following areas:   Dysphagia (Swallowing Impairment) Oral Care/Hydration.     Time Calculation:     Therapy Charges for Today       Code Description Service Date Service Provider Modifiers Qty    44424361118 HC ST EVAL ORAL PHARYNG SWALLOW 4 4/29/2024 Dotty Cheung MA,CCC-SLP GN 1    [id number] HC ST MOTION FLUORO EVAL SWALLOW 8 4/29/2024 Dotty Cheung,  MA,CCC-SLP GN 1            Dotty Cheung MA,RIKKI-SLP  4/29/2024

## 2024-04-30 LAB
ANION GAP SERPL CALCULATED.3IONS-SCNC: 11.6 MMOL/L (ref 5–15)
BACTERIA SPEC AEROBE CULT: ABNORMAL
BUN SERPL-MCNC: 18 MG/DL (ref 8–23)
BUN/CREAT SERPL: 14.5 (ref 7–25)
CALCIUM SPEC-SCNC: 8.7 MG/DL (ref 8.6–10.5)
CHLORIDE SERPL-SCNC: 105 MMOL/L (ref 98–107)
CO2 SERPL-SCNC: 23.4 MMOL/L (ref 22–29)
CREAT SERPL-MCNC: 1.24 MG/DL (ref 0.76–1.27)
DEPRECATED RDW RBC AUTO: 45.1 FL (ref 37–54)
EGFRCR SERPLBLD CKD-EPI 2021: 61 ML/MIN/1.73
ERYTHROCYTE [DISTWIDTH] IN BLOOD BY AUTOMATED COUNT: 13.8 % (ref 12.3–15.4)
GLUCOSE BLDC GLUCOMTR-MCNC: 132 MG/DL (ref 70–130)
GLUCOSE BLDC GLUCOMTR-MCNC: 157 MG/DL (ref 70–130)
GLUCOSE BLDC GLUCOMTR-MCNC: 174 MG/DL (ref 70–130)
GLUCOSE BLDC GLUCOMTR-MCNC: 261 MG/DL (ref 70–130)
GLUCOSE SERPL-MCNC: 157 MG/DL (ref 65–99)
GRAM STN SPEC: ABNORMAL
HCT VFR BLD AUTO: 31.7 % (ref 37.5–51)
HGB BLD-MCNC: 10.1 G/DL (ref 13–17.7)
ISOLATED FROM: ABNORMAL
ISOLATED FROM: ABNORMAL
MCH RBC QN AUTO: 28.3 PG (ref 26.6–33)
MCHC RBC AUTO-ENTMCNC: 31.9 G/DL (ref 31.5–35.7)
MCV RBC AUTO: 88.8 FL (ref 79–97)
PLATELET # BLD AUTO: 141 10*3/MM3 (ref 140–450)
PMV BLD AUTO: 11.6 FL (ref 6–12)
POTASSIUM SERPL-SCNC: 3.6 MMOL/L (ref 3.5–5.2)
RBC # BLD AUTO: 3.57 10*6/MM3 (ref 4.14–5.8)
SODIUM SERPL-SCNC: 140 MMOL/L (ref 136–145)
WBC NRBC COR # BLD AUTO: 13.79 10*3/MM3 (ref 3.4–10.8)

## 2024-04-30 PROCEDURE — 82948 REAGENT STRIP/BLOOD GLUCOSE: CPT

## 2024-04-30 PROCEDURE — 85027 COMPLETE CBC AUTOMATED: CPT | Performed by: STUDENT IN AN ORGANIZED HEALTH CARE EDUCATION/TRAINING PROGRAM

## 2024-04-30 PROCEDURE — 99232 SBSQ HOSP IP/OBS MODERATE 35: CPT | Performed by: STUDENT IN AN ORGANIZED HEALTH CARE EDUCATION/TRAINING PROGRAM

## 2024-04-30 PROCEDURE — 99232 SBSQ HOSP IP/OBS MODERATE 35: CPT | Performed by: NURSE PRACTITIONER

## 2024-04-30 PROCEDURE — C1751 CATH, INF, PER/CENT/MIDLINE: HCPCS

## 2024-04-30 PROCEDURE — 36410 VNPNXR 3YR/> PHY/QHP DX/THER: CPT

## 2024-04-30 PROCEDURE — 25010000002 ENOXAPARIN PER 10 MG: Performed by: STUDENT IN AN ORGANIZED HEALTH CARE EDUCATION/TRAINING PROGRAM

## 2024-04-30 PROCEDURE — 25810000003 SODIUM CHLORIDE 0.9 % SOLUTION: Performed by: HOSPITALIST

## 2024-04-30 PROCEDURE — 63710000001 INSULIN GLARGINE PER 5 UNITS: Performed by: STUDENT IN AN ORGANIZED HEALTH CARE EDUCATION/TRAINING PROGRAM

## 2024-04-30 PROCEDURE — 94799 UNLISTED PULMONARY SVC/PX: CPT

## 2024-04-30 PROCEDURE — 80048 BASIC METABOLIC PNL TOTAL CA: CPT | Performed by: STUDENT IN AN ORGANIZED HEALTH CARE EDUCATION/TRAINING PROGRAM

## 2024-04-30 PROCEDURE — 94761 N-INVAS EAR/PLS OXIMETRY MLT: CPT

## 2024-04-30 PROCEDURE — 94664 DEMO&/EVAL PT USE INHALER: CPT

## 2024-04-30 PROCEDURE — 63710000001 INSULIN LISPRO (HUMAN) PER 5 UNITS: Performed by: HOSPITALIST

## 2024-04-30 PROCEDURE — 25010000002 ERTAPENEM PER 500 MG: Performed by: NURSE PRACTITIONER

## 2024-04-30 RX ORDER — SODIUM CHLORIDE 9 MG/ML
40 INJECTION, SOLUTION INTRAVENOUS AS NEEDED
Status: DISCONTINUED | OUTPATIENT
Start: 2024-04-30 | End: 2024-05-01 | Stop reason: HOSPADM

## 2024-04-30 RX ORDER — SODIUM CHLORIDE 0.9 % (FLUSH) 0.9 %
10 SYRINGE (ML) INJECTION EVERY 12 HOURS SCHEDULED
Status: DISCONTINUED | OUTPATIENT
Start: 2024-04-30 | End: 2024-05-01 | Stop reason: HOSPADM

## 2024-04-30 RX ORDER — SODIUM CHLORIDE 0.9 % (FLUSH) 0.9 %
10 SYRINGE (ML) INJECTION AS NEEDED
Status: DISCONTINUED | OUTPATIENT
Start: 2024-04-30 | End: 2024-05-01 | Stop reason: HOSPADM

## 2024-04-30 RX ADMIN — CARVEDILOL 3.12 MG: 3.12 TABLET, FILM COATED ORAL at 17:27

## 2024-04-30 RX ADMIN — ERTAPENEM SODIUM 1000 MG: 1 INJECTION, POWDER, LYOPHILIZED, FOR SOLUTION INTRAMUSCULAR; INTRAVENOUS at 03:36

## 2024-04-30 RX ADMIN — ENOXAPARIN SODIUM 40 MG: 40 INJECTION SUBCUTANEOUS at 20:22

## 2024-04-30 RX ADMIN — ASPIRIN 81 MG: 81 TABLET, COATED ORAL at 08:50

## 2024-04-30 RX ADMIN — Medication 10 ML: at 20:23

## 2024-04-30 RX ADMIN — LUBIPROSTONE 8 MCG: 8 CAPSULE, GELATIN COATED ORAL at 20:21

## 2024-04-30 RX ADMIN — SODIUM CHLORIDE 100 ML/HR: 9 INJECTION, SOLUTION INTRAVENOUS at 03:18

## 2024-04-30 RX ADMIN — BUDESONIDE AND FORMOTEROL FUMARATE DIHYDRATE 2 PUFF: 160; 4.5 AEROSOL RESPIRATORY (INHALATION) at 19:17

## 2024-04-30 RX ADMIN — BUDESONIDE AND FORMOTEROL FUMARATE DIHYDRATE 2 PUFF: 160; 4.5 AEROSOL RESPIRATORY (INHALATION) at 07:25

## 2024-04-30 RX ADMIN — MUPIROCIN 1 APPLICATION: 20 OINTMENT TOPICAL at 20:22

## 2024-04-30 RX ADMIN — INSULIN GLARGINE 5 UNITS: 100 INJECTION, SOLUTION SUBCUTANEOUS at 20:22

## 2024-04-30 RX ADMIN — MUPIROCIN 1 APPLICATION: 20 OINTMENT TOPICAL at 15:56

## 2024-04-30 RX ADMIN — CARVEDILOL 3.12 MG: 3.12 TABLET, FILM COATED ORAL at 08:50

## 2024-04-30 RX ADMIN — INSULIN LISPRO 6 UNITS: 100 INJECTION, SOLUTION INTRAVENOUS; SUBCUTANEOUS at 20:22

## 2024-04-30 RX ADMIN — ROSUVASTATIN CALCIUM 10 MG: 10 TABLET, FILM COATED ORAL at 08:50

## 2024-04-30 RX ADMIN — INSULIN LISPRO 2 UNITS: 100 INJECTION, SOLUTION INTRAVENOUS; SUBCUTANEOUS at 17:19

## 2024-04-30 RX ADMIN — LISINOPRIL 20 MG: 10 TABLET ORAL at 08:50

## 2024-04-30 RX ADMIN — Medication 10 ML: at 08:50

## 2024-04-30 RX ADMIN — OXYBUTYNIN CHLORIDE 5 MG: 5 TABLET, EXTENDED RELEASE ORAL at 08:50

## 2024-04-30 RX ADMIN — INSULIN LISPRO 2 UNITS: 100 INJECTION, SOLUTION INTRAVENOUS; SUBCUTANEOUS at 12:21

## 2024-04-30 RX ADMIN — PANTOPRAZOLE SODIUM 40 MG: 40 TABLET, DELAYED RELEASE ORAL at 05:47

## 2024-04-30 RX ADMIN — Medication 1000 UNITS: at 08:50

## 2024-04-30 RX ADMIN — LUBIPROSTONE 8 MCG: 8 CAPSULE, GELATIN COATED ORAL at 08:50

## 2024-04-30 RX ADMIN — CETIRIZINE HYDROCHLORIDE 10 MG: 10 TABLET, FILM COATED ORAL at 08:50

## 2024-04-30 RX ADMIN — MONTELUKAST SODIUM 10 MG: 10 TABLET, COATED ORAL at 20:21

## 2024-04-30 RX ADMIN — LATANOPROST 1 DROP: 50 SOLUTION OPHTHALMIC at 08:54

## 2024-04-30 RX ADMIN — TAMSULOSIN HYDROCHLORIDE 0.4 MG: 0.4 CAPSULE ORAL at 08:50

## 2024-04-30 RX ADMIN — FINASTERIDE 5 MG: 5 TABLET, FILM COATED ORAL at 08:50

## 2024-04-30 NOTE — ED PROVIDER NOTES
Subjective     Altered Mental Status  Presenting symptoms: confusion, disorientation and lethargy    Presenting symptoms: no behavior changes, no combativeness, no memory loss, no partial responsiveness and no unresponsiveness    Severity:  Moderate  Most recent episode:  Today  Episode history:  Continuous  Duration:  4 hours  Timing:  Constant  Progression:  Worsening  Chronicity:  New  Context: not alcohol use, not dementia, not drug use, not head injury, not homeless, taking medications as prescribed, not nursing home resident, not recent change in medication, not recent illness and not recent infection    Associated symptoms: weakness    Associated symptoms: no abdominal pain, normal movement, no agitation, no bladder incontinence, no decreased appetite, no depression, no difficulty breathing, no eye deviation, no fever, no hallucinations, no headaches, no light-headedness, no nausea, no palpitations, no rash, no seizures, no slurred speech, no suicidal behavior, no visual change and no vomiting        Review of Systems   Constitutional:  Negative for activity change, appetite change, chills, decreased appetite, diaphoresis, fatigue and fever.   HENT:  Negative for congestion, ear pain and sore throat.    Eyes:  Negative for redness.   Respiratory:  Negative for cough, chest tightness, shortness of breath and wheezing.    Cardiovascular:  Negative for chest pain, palpitations and leg swelling.   Gastrointestinal:  Negative for abdominal pain, diarrhea, nausea and vomiting.   Genitourinary:  Negative for bladder incontinence, dysuria and urgency.   Musculoskeletal:  Negative for arthralgias, back pain, myalgias and neck pain.   Skin:  Negative for pallor, rash and wound.   Neurological:  Positive for weakness. Negative for dizziness, seizures, speech difficulty, light-headedness and headaches.   Psychiatric/Behavioral:  Positive for confusion. Negative for agitation, behavioral problems, decreased concentration,  hallucinations and memory loss.    All other systems reviewed and are negative.      Past Medical History:   Diagnosis Date    Arthritis     Asthma     Black lung disease     Diabetes mellitus     Elevated cholesterol     GERD (gastroesophageal reflux disease)     Hypertension     Kidney stone     Sleep apnea     no c-pap       No Known Allergies    Past Surgical History:   Procedure Laterality Date    CATARACT EXTRACTION WITH INTRAOCULAR LENS IMPLANT Bilateral     COLONOSCOPY      CYST REMOVAL Right     hip    CYSTOSCOPY LITHOLAPAXY BLADDER STONE EXTRACTION N/A 4/26/2024    Procedure: CYSTOSCOPY LITHOLAPAXY BLADDER STONE EXTRACTION;  Surgeon: Clayton Gauthier MD;  Location: [facility];  Service: Urology;  Laterality: N/A;    CYSTOSCOPY URETEROSCOPY LASER LITHOTRIPSY Left 4/26/2024    Procedure: LEFT URETEROSCOPY LASER LITHOTRIPSY;  Surgeon: Clayton Gauthier MD;  Location: [facility];  Service: Urology;  Laterality: Left;    EXTRACORPOREAL SHOCK WAVE LITHOTRIPSY (ESWL) Left 4/26/2024    Procedure: LEFT ESWL;  Surgeon: Clayton Gauthier MD;  Location: [facility];  Service: Urology;  Laterality: Left;       Family History   Problem Relation Age of Onset    Prostate cancer Father        Social History     Socioeconomic History    Marital status:    Tobacco Use    Smoking status: Never     Passive exposure: Never    Smokeless tobacco: Current     Types: Snuff   Vaping Use    Vaping status: Never Used   Substance and Sexual Activity    Alcohol use: Never    Drug use: Never    Sexual activity: Defer           Objective   Physical Exam  Vitals and nursing note reviewed.   Constitutional:       General: He is not in acute distress.     Appearance: Normal appearance. He is well-developed. He is ill-appearing. He is not toxic-appearing or diaphoretic.   HENT:      Head: Normocephalic and atraumatic.      Right Ear: External ear normal.      Left Ear: External ear normal.      Nose: Nose normal.       Mouth/Throat:      Pharynx: No oropharyngeal exudate.      Tonsils: No tonsillar exudate.   Eyes:      General: Lids are normal.      Conjunctiva/sclera: Conjunctivae normal.      Pupils: Pupils are equal, round, and reactive to light.   Neck:      Thyroid: No thyromegaly.   Cardiovascular:      Rate and Rhythm: Normal rate and regular rhythm.      Pulses: Normal pulses.      Heart sounds: Normal heart sounds, S1 normal and S2 normal.   Pulmonary:      Effort: Pulmonary effort is normal. No tachypnea or respiratory distress.      Breath sounds: Normal breath sounds. No decreased breath sounds, wheezing or rales.   Chest:      Chest wall: No tenderness.   Abdominal:      General: Bowel sounds are normal. There is no distension.      Palpations: Abdomen is soft.      Tenderness: There is no abdominal tenderness. There is no guarding or rebound.   Musculoskeletal:         General: No tenderness or deformity. Normal range of motion.      Cervical back: Full passive range of motion without pain, normal range of motion and neck supple.   Lymphadenopathy:      Cervical: No cervical adenopathy.   Skin:     General: Skin is warm and dry.      Coloration: Skin is not pale.      Findings: No erythema or rash.   Neurological:      Mental Status: He is alert and oriented to person, place, and time.      GCS: GCS eye subscore is 4. GCS verbal subscore is 4. GCS motor subscore is 6.      Cranial Nerves: No cranial nerve deficit.      Sensory: No sensory deficit.   Psychiatric:         Speech: Speech normal.         Thought Content: Thought content normal.         Judgment: Judgment normal.         Procedures           ED Course  ED Course as of 04/30/24 1326   Sat Apr 27, 2024   1622 Spoke to Neurology and will follow at this time. [ES]   1636 CT Head Without Contrast Stroke Protocol  IMPRESSION:  1.  No CT evidence of acute intracranial abnormality.  2.  Moderate chronic small vessel ischemic disease and mild  age-related  cerebral atrophy noted.  3.  Mild chronic paranasal sinus disease.      [ES]   1802 ECG 12 Lead Stroke Evaluation  Vent. Rate : 109 BPM     Atrial Rate : 109 BPM     P-R Int : 194 ms          QRS Dur :  86 ms      QT Int : 344 ms       P-R-T Axes :  55 -28  64 degrees     QTc Int : 463 ms     Sinus tachycardia with premature atrial complexes  Otherwise normal ECG  When compared with ECG of 24-APR-2024 13:05,  premature atrial complexes are now present  Vent. rate has increased BY  42 BPM  Criteria for Septal infarct are no longer present  Nonspecific T wave abnormality, improved in Lateral leads   [ES]   2011 CT Abdomen Pelvis Without Contrast  IMPRESSION:     CT CHEST:  CONSTELLATION OF FINDINGS COMPATIBLE WITH SARCOIDOSIS.     SMALL PERICARDIAL EFFUSION.     CT ABDOMEN AND PELVIS:  MILD TO MODERATE LEFT HYDRONEPHROSIS WITH DOUBLE-J URETERAL STENT IN  PLACE.     DEFECT IN THE LEFT RENAL PELVIS IS LIKELY DUE TO A SMALL CLOT FROM THE  RECENT INTERVENTION RATHER THAN A NEOPLASM.  CORRELATE WITH THE PREVIOUS  INTRAOPERATIVE UROGRAM.     MILDLY DISTENDED GALLBLADDER WITH NUMEROUS CALCULI.  NO ADDITIONAL  FINDINGS TO SUGGEST ACUTE CHOLECYSTITIS.   [ES]      ED Course User Index  [ES] Андрей Mccallum MD                                             Medical Decision Making  Amount and/or Complexity of Data Reviewed  Labs: ordered.  Radiology: ordered. Decision-making details documented in ED Course.  ECG/medicine tests: ordered. Decision-making details documented in ED Course.    Risk  OTC drugs.  Prescription drug management.  Decision regarding hospitalization.        Final diagnoses:   Pyelonephritis       ED Disposition  ED Disposition       ED Disposition   Decision to Admit    Condition   --    Comment   Level of Care: Med/Surg [1]   Diagnosis: Pyelonephritis [457666]   Admitting Physician: LATHA KULKARNI [[id number]]   Attending Physician: LATHA KULKARNI [[id number]]   Certification: I  Certify That Inpatient Hospital Services Are Medically Necessary For Greater Than 2 Midnights                 Megan Townsend MD  [address] [place], [place] [address]  [phone]    Schedule an appointment as soon as possible for a visit in 1 week(s)           Medication List      No changes were made to your prescriptions during this visit.            Андрей Mccallum MD  04/30/24 0919

## 2024-04-30 NOTE — PLAN OF CARE
Goal Outcome Evaluation:  Plan of Care Reviewed With: patient           Outcome Evaluation: patient has done well today no temps still alert and oriented family remains at bedside has a midline for ABX treatment at home vss no acute changes will continue plan of care

## 2024-04-30 NOTE — CONSULTS
"Assessment:  Diagnosis: pyelonephritis    No Known Allergies    Order Date/Time: 4/30/2024 0916  Indications: iv abx to 5/11/2024  LABS:  Lab Results   Component Value Date    INR 1.00 04/27/2024    PROTIME 13.7 04/27/2024     Lab Results   Component Value Date    PTT 35.2 (H) 04/27/2024     Lab Results   Component Value Date    WBC 13.79 (H) 04/30/2024    HGB 10.1 (L) 04/30/2024    HCT 31.7 (L) 04/30/2024    MCV 88.8 04/30/2024     04/30/2024     Lab Results   Component Value Date    BUN 18 04/30/2024     Lab Results   Component Value Date    CREATININE 1.24 04/30/2024     No results found for: \"EGFRIFNONA\", \"EGFRIFAFRI\"  Labs Reviewed: all labs reviewed    Contraindications for PICC/Midline:  No contraindications noted    Recommendations:  PowerGlide Pro Midline Catheter; 18 g; 10 cm  Upper Right Basilic    Procedure Time Out:  Time out Time: 1115  Correct Patient Identity: Yes  Correct Surgical Side and Site Are Marked: Yes  Agreement on Procedure to be done: Yes  Antibiotic Given: N/A  RN: KENTON Prescott RN    PowerGlide Pro Midline Catheter placed with ultrasound guidance and verified by blood return. Minimal blood loss noted. Catheter flushes easily. Blood return noted. Patient nurse, Sophia RN, made aware that midline is in upper R arm and ready for use.      Alma Prescott RN    "

## 2024-04-30 NOTE — PROGRESS NOTES
PROGRESS NOTE         Patient Identification:  Name:  Jamin Julio  Age:  73 y.o.  Sex:  male  :  1950  MRN:  [id number]  Visit Number:  [id number]  Primary Care Provider:  Christophe Jamil PA-C         LOS: 3 days       ----------------------------------------------------------------------------------------------------------------------  Subjective       Chief Complaints:    Altered Mental Status        Interval History:      Patient resting comfortably in bed this morning.  Family at bedside.  Overall feels better today.  Fever trend overall improved with Tmax of 99.9 overnight.  Currently on room air with no apparent distress.  Lungs clear to auscultation bilaterally.  Abdomen soft, nontender.  WBC improving at 13.79.  Blood cultures from 2024 show no growth thus far.  Urine culture preliminary reports 25,000 colonies of gram-negative bacilli.    Review of Systems:    Constitutional: no fever, chills and night sweats.  Generalized fatigue.  Eyes: no eye drainage, itching or redness.  HEENT: no mouth sores, dysphagia or nose bleed.  Respiratory: no for shortness of breath, cough or production of sputum.  Cardiovascular: no chest pain, no palpitations, no orthopnea.  Gastrointestinal: no nausea, vomiting or diarrhea. No abdominal pain, hematemesis or rectal bleeding.  Genitourinary: no dysuria or polyuria.  Hematologic/lymphatic: no lymph node abnormalities, no easy bruising or easy bleeding.  Musculoskeletal: no muscle or joint pain.  Skin: No rash and no itching.  Neurological: no loss of consciousness, no seizure, no headache.  Behavioral/Psych: no depression or suicidal ideation.  Endocrine: no hot flashes.  Immunologic: negative.    ----------------------------------------------------------------------------------------------------------------------      Objective       Current [facility] Meds:  aspirin, 81 mg, Oral, Daily  budesonide-formoterol, 2 puff, Inhalation, BID  carvedilol,  3.125 mg, Oral, BID With Meals  cetirizine, 10 mg, Oral, Daily  cholecalciferol, 1,000 Units, Oral, Daily  enoxaparin, 40 mg, Subcutaneous, Nightly  ertapenem, 1,000 mg, Intravenous, Q24H  finasteride, 5 mg, Oral, Daily  insulin glargine, 5 Units, Subcutaneous, Nightly  insulin lispro, 2-9 Units, Subcutaneous, 4x Daily AC & at Bedtime  latanoprost, 1 drop, Both Eyes, Daily  lisinopril, 20 mg, Oral, Q24H  lubiprostone, 8 mcg, Oral, BID  montelukast, 10 mg, Oral, Nightly  oxybutynin XL, 5 mg, Oral, Daily  pantoprazole, 40 mg, Oral, Q AM  rosuvastatin, 10 mg, Oral, Daily  sodium chloride, 10 mL, Intravenous, Q12H  tamsulosin, 0.4 mg, Oral, Daily      sodium chloride, 100 mL/hr, Last Rate: 100 mL/hr (04/30/24 0318)      ----------------------------------------------------------------------------------------------------------------------    Vital Signs:  Temp:  [97.8 °F (36.6 °C)-99.9 °F (37.7 °C)] 97.8 °F (36.6 °C)  Heart Rate:  [70-88] 79  Resp:  [18-20] 18  BP: (144-172)/(64-73) 172/70  No data found.    SpO2 Percentage    04/29/24 2300 04/30/24 0300 04/30/24 0725   SpO2: 99% 99% 98%     SpO2:  [96 %-99 %] 98 %  on   ;   Device (Oxygen Therapy): room air    Body mass index is 24.57 kg/m².  Wt Readings from Last 3 Encounters:   04/30/24 71.2 kg (156 lb 14.4 oz)   04/26/24 71.7 kg (158 lb)   04/19/24 72.1 kg (159 lb)        Intake/Output Summary (Last 24 hours) at 4/30/2024 0913  Last data filed at 4/30/2024 0300  Gross per 24 hour   Intake 240 ml   Output --   Net 240 ml     Diet: Regular/House; Fluid Consistency: Thin (IDDSI 0)  ----------------------------------------------------------------------------------------------------------------------      Physical Exam:    Constitutional:  Well-developed and well-nourished.  No respiratory distress.  Family at bedside.  Overall feels better today.  HENT:  Head: Normocephalic and atraumatic.  Mouth:  Moist mucous membranes.    Eyes:  Conjunctivae and EOM are normal.  No  scleral icterus.  Neck:  Neck supple.  No JVD present.    Cardiovascular:  Normal rate, regular rhythm and normal heart sounds with no murmur. No edema.  Pulmonary/Chest:  No respiratory distress, no wheezes, no crackles, with normal breath sounds and good air movement.  Abdominal:  Soft.  Bowel sounds are normal.  No distension and no tenderness.   Musculoskeletal:  No edema, no tenderness, and no deformity.  No swelling or redness of joints.  Neurological:  Alert and oriented to person, place, and time.  No facial droop.  No slurred speech.   Skin:  Skin is warm and dry.  No rash noted.  No pallor.   Psychiatric:  Normal mood and affect.  Behavior is normal.        ----------------------------------------------------------------------------------------------------------------------  Results from last 7 days   Lab Units 04/27/24 1955 04/27/24  1703 04/27/24  1617   HSTROP T ng/L 24* 26* 25*     Results from last 7 days   Lab Units 04/27/24  1703   PROBNP pg/mL 291.2         Results from last 7 days   Lab Units 04/30/24  0024 04/29/24  0258 04/28/24  0203 04/27/24 1955 04/27/24  1703 04/27/24  1617   CRP mg/dL  --   --  15.00*  --  5.14*  --    LACTATE mmol/L  --   --   --  1.4 2.8*  --    WBC 10*3/mm3 13.79* 15.01* 15.87*  --   --  15.54*   HEMOGLOBIN g/dL 10.1* 10.7* 11.0*  --   --  11.7*   HEMATOCRIT % 31.7* 34.1* 34.7*  --   --  35.7*   MCV fL 88.8 90.2 87.8  --   --  87.9   MCHC g/dL 31.9 31.4* 31.7  --   --  32.8   PLATELETS 10*3/mm3 141 128* 143  --   --  146   INR   --   --   --   --   --  1.00     Results from last 7 days   Lab Units 04/30/24  0024 04/29/24  0258 04/28/24  0203 04/27/24  1703 04/27/24  1618 04/27/24  1617   SODIUM mmol/L 140 140 143  --   --  136   POTASSIUM mmol/L 3.6 3.7 4.1  --   --  4.2   MAGNESIUM mg/dL  --   --   --  1.7  --   --    CHLORIDE mmol/L 105 106 105  --   --  100   CO2 mmol/L 23.4 22.2 25.7  --   --  22.1   BUN mg/dL 18 17 19  --   --  25*   CREATININE mg/dL 1.24 1.27  "1.62*  --    < > 1.57*   CALCIUM mg/dL 8.7 8.5* 9.0  --   --  9.3   GLUCOSE mg/dL 157* 153* 127*  --   --  299*   ALBUMIN g/dL  --   --  3.9  --   --  4.0   BILIRUBIN mg/dL  --   --  0.5  --   --  0.3   ALK PHOS U/L  --   --  82  --   --  85   AST (SGOT) U/L  --   --  19  --   --  22   ALT (SGPT) U/L  --   --  20  --   --  23    < > = values in this interval not displayed.   Estimated Creatinine Clearance: 52.6 mL/min (by C-G formula based on SCr of 1.24 mg/dL).  No results found for: \"AMMONIA\"    Hemoglobin A1C   Date/Time Value Ref Range Status   04/27/2024 1703 7.10 (H) 4.80 - 5.60 % Final     Glucose   Date/Time Value Ref Range Status   04/30/2024 0642 132 (H) 70 - 130 mg/dL Final   04/29/2024 2134 166 (H) 70 - 130 mg/dL Final   04/29/2024 1625 203 (H) 70 - 130 mg/dL Final   04/29/2024 1112 125 70 - 130 mg/dL Final   04/29/2024 0616 151 (H) 70 - 130 mg/dL Final   04/28/2024 2045 121 70 - 130 mg/dL Final   04/28/2024 1646 205 (H) 70 - 130 mg/dL Final   04/28/2024 1205 120 70 - 130 mg/dL Final     Lab Results   Component Value Date    HGBA1C 7.10 (H) 04/27/2024     Lab Results   Component Value Date    TSH 0.918 04/27/2024    FREET4 1.37 04/27/2024       Blood Culture   Date Value Ref Range Status   04/27/2024 Escherichia coli (C)  Preliminary   04/27/2024 Escherichia coli (C)  Preliminary     Urine Culture   Date Value Ref Range Status   04/27/2024 Culture in progress  Preliminary     No results found for: \"WOUNDCX\"  No results found for: \"STOOLCX\"  No results found for: \"RESPCX\"  Pain Management Panel           No data to display                  ----------------------------------------------------------------------------------------------------------------------  Imaging Results (Last 24 Hours)       Procedure Component Value Units Date/Time    FL Video Swallow Single Contrast [[id number]] Collected: 04/29/24 1437     Updated: 04/29/24 1439    Narrative:      EXAM:    FL Swallowing Function With Video/Cine   "   EXAM DATE:    4/29/2024 1:54 PM     CLINICAL HISTORY:    r/o aspiration     TECHNIQUE:    Fluoroscopy of the oral cavity through upper esophagus with video/cine  recording.  The study was performed in conjunction with speech  pathology.  Various consistencies of liquid and food were administered  orally by the speech pathologist.     Fluoroscopy exposure time of  approximately 1 minute or less.     COMPARISON:    No relevant prior studies available.     FINDINGS:    PHARYNGEAL PHASE:  Isolated penetration of pharyngeal residue after  the swallow.       Impression:          Please see the speech pathologist's report for additional  information.        This report was finalized on 4/29/2024 2:37 PM by Dr. Fernando Wise MD.               ----------------------------------------------------------------------------------------------------------------------    Pertinent Infectious Disease Results                Assessment/Plan       Assessment       ESBL bacteremia  Complicated UTI      Plan      Patient resting comfortably in bed this morning.  Family at bedside.  Overall feels better today.  Fever trend overall improved with Tmax of 99.9 overnight.  Currently on room air with no apparent distress.  Lungs clear to auscultation bilaterally.  Abdomen soft, nontender.  WBC improving at 13.79.  Blood cultures from 4/28/2024 show no growth thus far.  Urine culture preliminary reports 25,000 colonies of gram-negative bacilli.    Recommend to continue ertapenem 1 g IV every 24 hours monotherapy for treatment of ESBL bacteremia secondary to urinary source through 5/11/2024 in the setting of recent stent placement.  Post completion of ertapenem course patient will likely require suppressive oral therapy until stent is removed.  Case discussed with microbiology and ESBL E. coli is susceptible to ciprofloxacin and nitrofurantoin.  Patient require outpatient infectious disease follow-up.  We will continue to follow closely and  adjust antibiotic therapy as needed.      ANTIMICROBIAL THERAPY    ertapenem (INVanz) 1000 mg in 100 mL NS (VTB)     Code Status:   Code Status and Medical Interventions:   Ordered at: 04/27/24 2127     Code Status (Patient has no pulse and is not breathing):    CPR (Attempt to Resuscitate)     Medical Interventions (Patient has pulse or is breathing):    Full Support       KENDRA Cantu  04/30/24  09:13 EDT

## 2024-04-30 NOTE — PLAN OF CARE
Goal Outcome Evaluation:              Outcome Evaluation: Pt did not receive the 1500 Invanz, so pharmacy retimed for pt to receive it now at 0345. Pt resting in bed, no acute changes noted. VSS RA

## 2024-04-30 NOTE — PROGRESS NOTES
[facility] HOSPITALIST PROGRESS NOTE     Patient Identification:  Name:  Jamin Julio  Age:  73 y.o.  Sex:  male  :  1950  MRN:  [id number]  Visit Number:  [id number]  ROOM: [address] [place] [address]     Primary Care Provider:  Christophe Jamil PA-C    Length of stay in inpatient status:  3    Subjective     Chief Compliant:    Chief Complaint   Patient presents with    Altered Mental Status       History of Presenting Illness: Patient seen and evaluated in follow-up for ESBL bacteremia felt to be of urologic origin given recent obstructing ureteral stone status post stent placement.  Patient time evaluation resting comfortably in bed with no acute complaints.  Lab work trending appropriately and patient significantly improved with no further fever.    Objective     Current Hospital Meds:  aspirin, 81 mg, Oral, Daily  budesonide-formoterol, 2 puff, Inhalation, BID  carvedilol, 3.125 mg, Oral, BID With Meals  cetirizine, 10 mg, Oral, Daily  cholecalciferol, 1,000 Units, Oral, Daily  enoxaparin, 40 mg, Subcutaneous, Nightly  ertapenem, 1,000 mg, Intravenous, Q24H  finasteride, 5 mg, Oral, Daily  insulin glargine, 5 Units, Subcutaneous, Nightly  insulin lispro, 2-9 Units, Subcutaneous, 4x Daily AC & at Bedtime  latanoprost, 1 drop, Both Eyes, Daily  lisinopril, 20 mg, Oral, Q24H  lubiprostone, 8 mcg, Oral, BID  montelukast, 10 mg, Oral, Nightly  mupirocin, 1 application , Each Nare, BID  oxybutynin XL, 5 mg, Oral, Daily  pantoprazole, 40 mg, Oral, Q AM  rosuvastatin, 10 mg, Oral, Daily  sodium chloride, 10 mL, Intravenous, Q12H  sodium chloride, 10 mL, Intravenous, Q12H  tamsulosin, 0.4 mg, Oral, Daily      sodium chloride, 100 mL/hr, Last Rate: 100 mL/hr (24 0318)      ----------------------------------------------------------------------------------------------------------------------  Vital Signs:  Temp:  [97.8 °F (36.6 °C)-99.4 °F (37.4 °C)] 98.2 °F (36.8 °C)  Heart Rate:  [68-84] 73  Resp:  [18-20]  18  BP: (148-172)/(64-75) 170/75  SpO2:  [98 %-99 %] 98 %  on   ;   Device (Oxygen Therapy): room air  Body mass index is 24.57 kg/m².      Intake/Output Summary (Last 24 hours) at 4/30/2024 1930  Last data filed at 4/30/2024 1200  Gross per 24 hour   Intake 720 ml   Output --   Net 720 ml      ----------------------------------------------------------------------------------------------------------------------  Physical exam:  Constitutional: Elderly adult male resting in bed in no distress.      HENT:  Head:  Normocephalic and atraumatic.  Mouth:  Moist mucous membranes.    Eyes:  Conjunctivae and EOM are normal. No scleral icterus.    Cardiovascular:  Normal rate, regular rhythm and normal heart sounds with no murmur.  Pulmonary/Chest:  No respiratory distress, no wheezes, no crackles, with normal breath sounds and good air movement.  Abdominal:  Soft.  Bowel sounds are normal.  No distension but mild tenderness to palpation in the right lower quadrant bordering on suprapubic region.   Musculoskeletal:  No tenderness, and no deformity.  No red or swollen joints anywhere.   Neurological:  Alert and oriented to person, place, and time.  No cranial nerve deficit.  No tongue deviation.  No facial droop.  No slurred speech. Intact Sensation throughout  Skin:  Skin is warm and dry. No rash or lesion noted. No pallor.   Peripheral vascular:  Pulses in all 4 extremities with no clubbing, no cyanosis, no edema.  Psychiatric: Appropriate mood and affect, pleasant.   ----------------------------------------------------------------------------------------------------------------------  WBC/HGB/HCT/PLT   13.79/10.1/31.7/141 (04/30 0024)  BUN/CREAT/GLUC/ALT/AST/EMBER/LIP    18/1.24/157/--/--/--/-- (04/30 0024)  LYTES - Na/K/Cl/CO2: 140/3.6/105/23.4 (04/30 0024)        Urine Culture   Date Value Ref Range Status   04/27/2024 25,000 CFU/mL Gram Negative Bacilli (A)  Preliminary     Blood Culture   Date Value Ref Range Status    04/28/2024 No growth at 24 hours  Preliminary   04/28/2024 No growth at 24 hours  Preliminary   04/27/2024 Escherichia coli (C)  Preliminary   04/27/2024 Escherichia coli (C)  Preliminary       I have personally looked at the labs and they are summarized above.  ----------------------------------------------------------------------------------------------------------------------  Detailed radiology reports for the last 24 hours:  FL Video Swallow Single Contrast    Result Date: 4/29/2024      Please see the speech pathologist's report for additional information.   This report was finalized on 4/29/2024 2:37 PM by Dr. Fernando Wise MD.     Assessment & Plan      ESBL bacteremia  Acute kidney injury, resolved  Obstructing ureteral stone s/p stent    -Urine culture 25,000 colonies of units of gram-negative bacilli with blood cultures showing E. coli in 2 out of 2 bottles with evidence of ESBL detection on PCR.    -Patient initiated on ertapenem once daily 1 g x 14 days through 5/11/2024    -Repeat blood cultures thus far with no growth to date     -With successful midline placement to complete a 14-day course of-IV antibiotic therapy.    -Plan for discharge home tomorrow if IV antibiotic therapy able to be arranged    Diabetes mellitus type 2    -Continue basal and bolus insulin as needed-pain glycemic control.        Copied text in portions of the note has been reviewed and is accurate as of 04/30/24    VTE Prophylaxis:   Mechanical Order History:        Ordered        04/27/24 2158  Place Sequential Compression Device  Once,   Status:  Canceled            04/27/24 2158  Maintain Sequential Compression Device  Continuous,   Status:  Canceled                          Pharmalogical Order History:        Ordered     Dose Route Frequency Stop    04/29/24 1919  Enoxaparin Sodium (LOVENOX) syringe 40 mg         40 mg SC Nightly --    04/29/24 1917  Pharmacy to Dose enoxaparin (LOVENOX)  Status:  Discontinued         Question:  Indication of use  Answer:  Prophylaxis    -- XX Continuous PRN 04/29/24 1920                    Disposition Home in the next 24 hours    Justin Aguilar DO  [facility] Hospitalist  04/30/24  19:30 EDT

## 2024-05-01 ENCOUNTER — READMISSION MANAGEMENT (OUTPATIENT)
Dept: CALL CENTER | Facility: HOSPITAL | Age: 74
End: 2024-05-01
Payer: MEDICARE

## 2024-05-01 VITALS
HEIGHT: 67 IN | HEART RATE: 70 BPM | SYSTOLIC BLOOD PRESSURE: 141 MMHG | WEIGHT: 155.9 LBS | TEMPERATURE: 97.5 F | RESPIRATION RATE: 18 BRPM | OXYGEN SATURATION: 99 % | DIASTOLIC BLOOD PRESSURE: 57 MMHG | BODY MASS INDEX: 24.47 KG/M2

## 2024-05-01 LAB
DEPRECATED RDW RBC AUTO: 46.3 FL (ref 37–54)
ERYTHROCYTE [DISTWIDTH] IN BLOOD BY AUTOMATED COUNT: 13.8 % (ref 12.3–15.4)
GLUCOSE BLDC GLUCOMTR-MCNC: 141 MG/DL (ref 70–130)
GLUCOSE BLDC GLUCOMTR-MCNC: 225 MG/DL (ref 70–130)
HCT VFR BLD AUTO: 32 % (ref 37.5–51)
HGB BLD-MCNC: 9.9 G/DL (ref 13–17.7)
MCH RBC QN AUTO: 28.2 PG (ref 26.6–33)
MCHC RBC AUTO-ENTMCNC: 30.9 G/DL (ref 31.5–35.7)
MCV RBC AUTO: 91.2 FL (ref 79–97)
PLATELET # BLD AUTO: 151 10*3/MM3 (ref 140–450)
PMV BLD AUTO: 11.2 FL (ref 6–12)
RBC # BLD AUTO: 3.51 10*6/MM3 (ref 4.14–5.8)
WBC NRBC COR # BLD AUTO: 7.64 10*3/MM3 (ref 3.4–10.8)

## 2024-05-01 PROCEDURE — 85027 COMPLETE CBC AUTOMATED: CPT | Performed by: STUDENT IN AN ORGANIZED HEALTH CARE EDUCATION/TRAINING PROGRAM

## 2024-05-01 PROCEDURE — 63710000001 INSULIN LISPRO (HUMAN) PER 5 UNITS: Performed by: HOSPITALIST

## 2024-05-01 PROCEDURE — 82948 REAGENT STRIP/BLOOD GLUCOSE: CPT

## 2024-05-01 PROCEDURE — 99232 SBSQ HOSP IP/OBS MODERATE 35: CPT | Performed by: NURSE PRACTITIONER

## 2024-05-01 PROCEDURE — 25010000002 ERTAPENEM PER 500 MG: Performed by: NURSE PRACTITIONER

## 2024-05-01 PROCEDURE — 99239 HOSP IP/OBS DSCHRG MGMT >30: CPT | Performed by: INTERNAL MEDICINE

## 2024-05-01 RX ORDER — MELOXICAM 15 MG/1
15 TABLET ORAL DAILY PRN
Start: 2024-05-01

## 2024-05-01 RX ADMIN — CARVEDILOL 3.12 MG: 3.12 TABLET, FILM COATED ORAL at 08:40

## 2024-05-01 RX ADMIN — ROSUVASTATIN CALCIUM 10 MG: 10 TABLET, FILM COATED ORAL at 08:41

## 2024-05-01 RX ADMIN — Medication 10 ML: at 08:45

## 2024-05-01 RX ADMIN — LATANOPROST 1 DROP: 50 SOLUTION OPHTHALMIC at 08:45

## 2024-05-01 RX ADMIN — INSULIN LISPRO 4 UNITS: 100 INJECTION, SOLUTION INTRAVENOUS; SUBCUTANEOUS at 10:36

## 2024-05-01 RX ADMIN — LUBIPROSTONE 8 MCG: 8 CAPSULE, GELATIN COATED ORAL at 08:40

## 2024-05-01 RX ADMIN — ERTAPENEM SODIUM 1000 MG: 1 INJECTION, POWDER, LYOPHILIZED, FOR SOLUTION INTRAMUSCULAR; INTRAVENOUS at 03:24

## 2024-05-01 RX ADMIN — PANTOPRAZOLE SODIUM 40 MG: 40 TABLET, DELAYED RELEASE ORAL at 05:45

## 2024-05-01 RX ADMIN — Medication 1000 UNITS: at 08:41

## 2024-05-01 RX ADMIN — CETIRIZINE HYDROCHLORIDE 10 MG: 10 TABLET, FILM COATED ORAL at 08:40

## 2024-05-01 RX ADMIN — FINASTERIDE 5 MG: 5 TABLET, FILM COATED ORAL at 08:41

## 2024-05-01 RX ADMIN — MUPIROCIN 1 APPLICATION: 20 OINTMENT TOPICAL at 08:45

## 2024-05-01 RX ADMIN — ASPIRIN 81 MG: 81 TABLET, COATED ORAL at 08:40

## 2024-05-01 RX ADMIN — OXYBUTYNIN CHLORIDE 5 MG: 5 TABLET, EXTENDED RELEASE ORAL at 08:41

## 2024-05-01 RX ADMIN — TAMSULOSIN HYDROCHLORIDE 0.4 MG: 0.4 CAPSULE ORAL at 08:41

## 2024-05-01 RX ADMIN — LISINOPRIL 20 MG: 10 TABLET ORAL at 08:40

## 2024-05-01 NOTE — DISCHARGE PLACEMENT REQUEST
"Jamin Schafer (73 y.o. Male)       Date of Birth   1950    Social Security Number       Address   [address] [facility] [address]    Home Phone   [phone]    MRN   [id number]       [facility]    Marital Status                               Admission Date   24    Admission Type   Emergency    Admitting Provider   Jamin Soto MD    Attending Provider   Erika Paredes DO    Department, Room/Bed   [address], 3349/1S       Discharge Date       Discharge Disposition   [place] [facility] [place] Destination                                 Attending Provider: Erika Paredes DO    Allergies: No Known Allergies    Isolation: Contact   Infection: ESBL (24), ESBL E coli (24)   Code Status: CPR    Ht: 170.2 cm (67\")   Wt: 70.7 kg (155 lb 14.4 oz)    Admission Cmt: None   Principal Problem: Pyelonephritis [N12]                   Active Insurance as of 2024       Primary Coverage       Payor Plan Insurance Group Employer/Plan Group    HUMANA MEDICARE REPLACEMENT HUMANA MED ADV PPO [id number]       Payor Plan Address Payor Plan Phone Number Payor Plan Fax Number Effective Dates    [address] [address] [phone]  2023 - None Entered    [facility]ly [facility] [address]         Subscriber Name Subscriber Birth Date Member ID       JAMIN SCHAFER 1950 [id number]                     Emergency Contacts        (Rel.) Home Phone Work Phone Mobile Phone    Latasha Schafer (Spouse) [phone] -- [phone]    Chan Schafer (Son) -- -- [phone]    Viky Carpenter (Daughter) -- -- [phone]    Shekhar Schafer (Son) [phone] -- [phone]          [address] [address]  Phone:  [phone]  Fax:  [phone] Date: May 1, 2024      Ambulatory Referral to Home Health     Patient:  Jamin Schafer MRN:  [id number]   [address]  [facility] [address] :  1950  SSN:  "   Phone: [phone] Sex:  M      INSURANCE PAYOR PLAN GROUP # SUBSCRIBER ID   Primary:    HUMANA MEDICARE REPLACEMENT [id number]      Referring Provider Information:  RAMA CULVER Phone: [phone] Fax: [phone]       Referral Information:   # Visits:  999 Referral Type: Home Health [42]   Urgency:  Routine Referral Reason: Specialty Services Required   Start Date: May 1, 2024 End Date:  To be determined by Insurer   Diagnosis: Pyelonephritis (N12 [ICD-10-CM] 590.80 [ICD-9-CM])      Refer to Dept:   Refer to Provider:   Refer to Provider Phone:   Refer to Facility:    Weekly CMP and CK while on antibiotics; results to PCP or ID   Please remove Midline/PICC after last IV antibiotic infusion  Face to Face Visit Date: 5/1/2024  Follow-up provider for Plan of Care? I treated the patient in an acute care facility and will not continue treatment after discharge.  Follow-up provider: MARIANN WHITT [[id number]]  Reason/Clinical Findings: bacteremia  Describe mobility limitations that make leaving home difficult: IV antibiotics  Nursing/Therapeutic Services Requested: Skilled Nursing  Skilled nursing orders: PICC line care/instruction  Frequency: 1 Week 1     This document serves as a request of services and does not constitute Insurance authorization or approval of services.  To determine eligibility, please contact the members Insurance carrier to verify and review coverage.     If you have medical questions regarding this request for services. Please contact [address] at [phone] during normal business hours.        Authorizing Provider:Rama Culver DO  Authorizing Provider's NPI: [id number]  Order Entered By: Rama Culver DO 5/1/2024 10:13 AM     Electronically signed by: Rama Culver DO 5/1/2024 10:13 AM          History & Physical        Jamin Soto MD at 04/27/24 2212          Hospitalist History and Physical        Patient  Identification  Name: Jamin Julio  Age/Sex: 73 y.o. male  :  1950        MRN: [id number]  Visit Number: [id number]  Admit Date: 2024   PCP: Christophe Jamil PA-C          Chief complaint confusion, fever    History of Present Illness:  Patient is a 73 y.o. male with history of asthma, black lung, arthritis, type II DM, HTN, HLD, GERD, JUANCARLOS and kidney stones who underwent cystoscopy with lithotripsy of obstructing left ureteral stones and left ureteral stent placement. He did well post-op initially and was even feeling well this morning. However, later today he became more confused, urinated on himself and was found to have a fever. He was brought to the ED and initial concerns were for stroke. CT head and CT angiograms head and neck showed no evidence of stroke or LVO. CT cerebral perfusion scan commented on artifact vs ischemia parameters in the left frontal lobe. Neurology reportedly weighed in on patient and felt that confusion was more due to underlying infectious process rather than stroke. Patient was febrile up to 102.9 F in the ED and tachycardic up to 116. He was initially hypertensive as well but this has improved without intervention. Labs showed mild troponin elevation with flat trend, normal BNP, BUN 25, Cr 1.57 (down from 1.77 3 days ago but overall up from 1.20 on ). Glucose elevated at 299. LFTs normal. CRP 5.14, lactate 2.8, procal 0.58, WBC 15.54. hemoglobin stable at 11.7. UA showed positive nitrite, mod leuk esterase, large blood, TNTC RBC and WBC, and 1+ bacteria. Urine and blood cultures are pending. CT chest showed constellation of findings compatible with sarcoid, small pericardial effusion, mild to moderate left hydronephrosis (improved from 24 scan that showed marked left sided hydro and ureteral dilatation secondary to multiple obstructing distal left ureteral stones) with double-J ureteral stent in place, defect in left renal pelvis likely due to small clot  from recent intervention, and mildly distended gallbladder with numerous stones but no other signs of acute cholecystitis (this was mentioned on Ct from 4/26/24 as well). Patient has been admitted for further management of severe sepsis and acute encephalopathy secondary to pyelonephritis.     Review of Systems  Review of Systems   Unable to perform ROS: Mental status change       History  Past Medical History:   Diagnosis Date    Arthritis     Asthma     Black lung disease     Diabetes mellitus     Elevated cholesterol     GERD (gastroesophageal reflux disease)     Hypertension     Kidney stone     Sleep apnea     no c-pap     Past Surgical History:   Procedure Laterality Date    CATARACT EXTRACTION WITH INTRAOCULAR LENS IMPLANT Bilateral     COLONOSCOPY      CYST REMOVAL Right     hip     Family History   Problem Relation Age of Onset    Prostate cancer Father      Social History     Tobacco Use    Smoking status: Never     Passive exposure: Never    Smokeless tobacco: Current     Types: Snuff   Vaping Use    Vaping status: Never Used   Substance Use Topics    Alcohol use: Never    Drug use: Never     Medications Prior to Admission   Medication Sig Dispense Refill Last Dose    albuterol sulfate  (90 Base) MCG/ACT inhaler Inhale 2 puffs Every 4 (Four) Hours As Needed for Shortness of Air.       Aspirin Low Dose 81 MG EC tablet Take 1 tablet by mouth Daily.       budesonide-formoterol (SYMBICORT) 160-4.5 MCG/ACT inhaler Inhale 2 puffs 2 (Two) Times a Day. Rinse mouth after each use       carvedilol (COREG) 3.125 MG tablet Take 1 tablet by mouth 2 (Two) Times a Day With Meals.       glimepiride (AMARYL) 1 MG tablet 1 tablet.       HYDROcodone-acetaminophen (NORCO)  MG per tablet Take 1 tablet by mouth Every 6 (Six) Hours As Needed for Moderate Pain (Pain). 20 tablet 0     Januvia 100 MG tablet 1 tablet.       lactulose (CHRONULAC) 10 GM/15ML solution Take 30 mL by mouth 2 (Two) Times a Day As Needed (for  constipation). 237 mL 1     latanoprost (XALATAN) 0.005 % ophthalmic solution INSTILL 1 DROP IN BOTH EYES EVERY DAY IN THE EVENING       levocetirizine (XYZAL) 5 MG tablet Take 1 tablet by mouth every night at bedtime.       linaclotide (LINZESS) 145 MCG capsule capsule Take 1 capsule by mouth Every Morning Before Breakfast. 30 capsule 2     lisinopril (PRINIVIL,ZESTRIL) 20 MG tablet Take 1 tablet by mouth every night at bedtime.       meloxicam (MOBIC) 15 MG tablet        metFORMIN (GLUCOPHAGE) 500 MG tablet take 1 tablet by mouth twice daily with the morning and evening meal       metoprolol tartrate (LOPRESSOR) 25 MG tablet Take  by mouth.       Mirabegron ER (MYRBETRIQ) 25 MG tablet sustained-release 24 hour 24 hr tablet Take 1 tablet by mouth Daily. 30 tablet 2     montelukast (SINGULAIR) 10 MG tablet        omeprazole (priLOSEC) 20 MG capsule TAKE 1 CAPSULE BY MOUTH EVERY DAY 30 MINUTES TO 1 HOUR BEFORE A MEAL       rosuvastatin (CRESTOR) 10 MG tablet Take 1 tablet by mouth Daily.       tamsulosin (FLOMAX) 0.4 MG capsule 24 hr capsule Take 1 capsule by mouth Daily. 90 capsule 3      Allergies:  Patient has no known allergies.    Objective    Vital Signs  Temp:  [102.9 °F (39.4 °C)] 102.9 °F (39.4 °C)  Heart Rate:  [106-116] 116  Resp:  [14] 14  BP: (140-199)/(62-82) 164/77  Body mass index is 25.51 kg/m².    Physical Exam:  Physical Exam  Constitutional:       Comments: Awake but confused, acutely ill in appearance   HENT:      Head: Normocephalic and atraumatic.      Right Ear: External ear normal.      Left Ear: External ear normal.      Nose: Nose normal.      Mouth/Throat:      Mouth: Mucous membranes are moist.      Pharynx: Oropharynx is clear.   Eyes:      Extraocular Movements: Extraocular movements intact.      Conjunctiva/sclera: Conjunctivae normal.      Pupils: Pupils are equal, round, and reactive to light.   Cardiovascular:      Rate and Rhythm: Regular rhythm. Tachycardia present.      Pulses:  Normal pulses.      Heart sounds: Normal heart sounds. No murmur heard.  Pulmonary:      Effort: Pulmonary effort is normal. No respiratory distress.      Breath sounds: Normal breath sounds. No wheezing or rales.   Abdominal:      General: Abdomen is flat. Bowel sounds are normal. There is no distension.      Palpations: Abdomen is soft.      Tenderness: There is no abdominal tenderness. There is left CVA tenderness.   Musculoskeletal:         General: Normal range of motion.      Cervical back: Neck supple. No tenderness.      Right lower leg: No edema.      Left lower leg: No edema.   Lymphadenopathy:      Cervical: No cervical adenopathy.   Skin:     General: Skin is warm and dry.      Capillary Refill: Capillary refill takes less than 2 seconds.      Coloration: Skin is not jaundiced.      Findings: No bruising or lesion.   Neurological:      General: No focal deficit present.      Comments: Awake but confused, oriented to self and place but not year or context   Psychiatric:      Comments: Unable to assess           Results Review:       Lab Results:  Results from last 7 days   Lab Units 04/27/24  1617 04/24/24  1403   WBC 10*3/mm3 15.54* 8.92   HEMOGLOBIN g/dL 11.7* 11.0*   PLATELETS 10*3/mm3 146 168     Results from last 7 days   Lab Units 04/27/24  1703   CRP mg/dL 5.14*     Results from last 7 days   Lab Units 04/27/24  1617 04/24/24  1403   SODIUM mmol/L 136 141   POTASSIUM mmol/L 4.2 4.6   CHLORIDE mmol/L 100 104   CO2 mmol/L 22.1 28.1   BUN mg/dL 25* 24*   CREATININE mg/dL 1.57* 1.77*   CALCIUM mg/dL 9.3 9.7   GLUCOSE mg/dL 299* 143*     Results from last 7 days   Lab Units 04/27/24  1703   MAGNESIUM mg/dL 1.7     Hemoglobin A1C   Date Value Ref Range Status   04/27/2024 7.10 (H) 4.80 - 5.60 % Final     Results from last 7 days   Lab Units 04/27/24  1617   BILIRUBIN mg/dL 0.3   ALK PHOS U/L 85   AST (SGOT) U/L 22   ALT (SGPT) U/L 23     Results from last 7 days   Lab Units 04/27/24  1955 04/27/24  1703  04/27/24  1617   HSTROP T ng/L 24* 26* 25*         Results from last 7 days   Lab Units 04/27/24  1617   INR  1.00           I have reviewed the patient's laboratory results.    Imaging:  Imaging Results (Last 72 Hours)       Procedure Component Value Units Date/Time    CT Chest Without Contrast Diagnostic [[id number]] Collected: 04/27/24 1758     Updated: 04/27/24 1813    Narrative:      CLINICAL HISTORY: COPD suspected, abdominal pain.     COMPARISON: CT of the abdomen and pelvis on 4/26/2024.     TECHNIQUE: Noncontrast CT of the chest, abdomen and pelvis was obtained.   Multiplanar reformats were generated Limited exposure control,  adjustment of the mA and/or KV according to patient size or use of  iterative reconstruction technique was utilized.     FINDINGS:     CT CHEST:     Pulmonary arteries: normal.      Heart and pericardium: Normal heart size.  Pericardial effusion.   Extensive coronary calcifications..     Aorta: normal.     Esophagus: normal.     Lungs and airways: Upper lobe predominant nodules and areas of  consolidation along the bronchovascular bundles with mild volume loss  and scarring.  Subsegmental dependent bibasilar atelectasis is noted.   Central airways are patent.     Pleura: normal.     Lymph nodes: Mediastinal and hilar lymph nodes are calcified.     Musculoskeletal and chest wall: no acute abnormality.     Other: n/a        CT ABDOMEN AND PELVIS:     Hepatobiliary: Gallbladder is moderately distended and contains numerous  calculi.  The liver is normal.     Pancreas: normal.     Spleen: Calcified granulomata are scattered to the otherwise normal  spleen.     Adrenals: normal.      Kidneys, ureters, bladder: Since the prior study, the patient has  undergone placement of a double-J ureteral stent in the left renal  collecting system, with the proximal loop in the upper pole calyx and  the distal loop in the urinary bladder.  The stone seen on the prior  study are not as well-seen on this  exam and may have been removed or not  visible due to the contrast media in the renal collecting system.   Filling defects in the renal pelvis may be due to small clot from a  recent intervention.  Correlate with the intra-procedural retrograde  urogram.  The right kidney and ureter are normal.  Urinary bladder is  mildly trabeculated.     Reproductive: Ag gland is mildly enlarged at 5.6 cm in diameter and  is partially calcified.     GI tract/Bowel: Sigmoid diverticulosis without findings for acute  diverticulitis.  No bowel obstruction.  No pneumatosis or portal venous  gas..     Appendix: normal.     Peritoneum: normal, no free air or fluid.     Vascular: Aortoiliac atherosclerosis.     Lymph nodes: normal.     Musculoskeletal and abdominal wall: Multilevel degenerative changes in  the lumbar spine       Impression:         CT CHEST:  CONSTELLATION OF FINDINGS COMPATIBLE WITH SARCOIDOSIS.     SMALL PERICARDIAL EFFUSION.     CT ABDOMEN AND PELVIS:  MILD TO MODERATE LEFT HYDRONEPHROSIS WITH DOUBLE-J URETERAL STENT IN  PLACE.     DEFECT IN THE LEFT RENAL PELVIS IS LIKELY DUE TO A SMALL CLOT FROM THE  RECENT INTERVENTION RATHER THAN A NEOPLASM.  CORRELATE WITH THE PREVIOUS  INTRAOPERATIVE UROGRAM.     MILDLY DISTENDED GALLBLADDER WITH NUMEROUS CALCULI.  NO ADDITIONAL  FINDINGS TO SUGGEST ACUTE CHOLECYSTITIS.     This report was finalized on 4/27/2024 6:10 PM by Trish Chang MD.       CT Abdomen Pelvis Without Contrast [[id number]] Collected: 04/27/24 1758     Updated: 04/27/24 1813    Narrative:      CLINICAL HISTORY: COPD suspected, abdominal pain.     COMPARISON: CT of the abdomen and pelvis on 4/26/2024.     TECHNIQUE: Noncontrast CT of the chest, abdomen and pelvis was obtained.   Multiplanar reformats were generated Limited exposure control,  adjustment of the mA and/or KV according to patient size or use of  iterative reconstruction technique was utilized.     FINDINGS:     CT CHEST:     Pulmonary arteries:  normal.      Heart and pericardium: Normal heart size.  Pericardial effusion.   Extensive coronary calcifications..     Aorta: normal.     Esophagus: normal.     Lungs and airways: Upper lobe predominant nodules and areas of  consolidation along the bronchovascular bundles with mild volume loss  and scarring.  Subsegmental dependent bibasilar atelectasis is noted.   Central airways are patent.     Pleura: normal.     Lymph nodes: Mediastinal and hilar lymph nodes are calcified.     Musculoskeletal and chest wall: no acute abnormality.     Other: n/a        CT ABDOMEN AND PELVIS:     Hepatobiliary: Gallbladder is moderately distended and contains numerous  calculi.  The liver is normal.     Pancreas: normal.     Spleen: Calcified granulomata are scattered to the otherwise normal  spleen.     Adrenals: normal.      Kidneys, ureters, bladder: Since the prior study, the patient has  undergone placement of a double-J ureteral stent in the left renal  collecting system, with the proximal loop in the upper pole calyx and  the distal loop in the urinary bladder.  The stone seen on the prior  study are not as well-seen on this exam and may have been removed or not  visible due to the contrast media in the renal collecting system.   Filling defects in the renal pelvis may be due to small clot from a  recent intervention.  Correlate with the intra-procedural retrograde  urogram.  The right kidney and ureter are normal.  Urinary bladder is  mildly trabeculated.     Reproductive: Ag gland is mildly enlarged at 5.6 cm in diameter and  is partially calcified.     GI tract/Bowel: Sigmoid diverticulosis without findings for acute  diverticulitis.  No bowel obstruction.  No pneumatosis or portal venous  gas..     Appendix: normal.     Peritoneum: normal, no free air or fluid.     Vascular: Aortoiliac atherosclerosis.     Lymph nodes: normal.     Musculoskeletal and abdominal wall: Multilevel degenerative changes in  the lumbar  spine       Impression:         CT CHEST:  CONSTELLATION OF FINDINGS COMPATIBLE WITH SARCOIDOSIS.     SMALL PERICARDIAL EFFUSION.     CT ABDOMEN AND PELVIS:  MILD TO MODERATE LEFT HYDRONEPHROSIS WITH DOUBLE-J URETERAL STENT IN  PLACE.     DEFECT IN THE LEFT RENAL PELVIS IS LIKELY DUE TO A SMALL CLOT FROM THE  RECENT INTERVENTION RATHER THAN A NEOPLASM.  CORRELATE WITH THE PREVIOUS  INTRAOPERATIVE UROGRAM.     MILDLY DISTENDED GALLBLADDER WITH NUMEROUS CALCULI.  NO ADDITIONAL  FINDINGS TO SUGGEST ACUTE CHOLECYSTITIS.     This report was finalized on 4/27/2024 6:10 PM by Trish Chang MD.       XR Chest 1 View [[id number]] Collected: 04/27/24 1756     Updated: 04/27/24 1759    Narrative:      CLINICAL HISTORY: Altered mental status.     COMPARISON: CT chest 04/27/2024.     TECHNIQUE: Single portable upright AP view of the chest was obtained.     FINDINGS: Small nodular opacities are scattered the lungs.  No pleural  effusion or pneumothorax is seen.  Heart size is mildly enlarged.  No  acute osseous or upper abdominal abnormality is seen.       Impression:         SMALL NODULAR OPACITIES SCATTERED THROUGH THE LUNGS. CT HAS ALREADY BEEN  PERFORMED.     CARDIOMEGALY.           This report was finalized on 4/27/2024 5:57 PM by Trish Chang MD.       CT CEREBRAL PERFUSION WITH & WITHOUT CONTRAST [[id number]] Collected: 04/27/24 1646     Updated: 04/27/24 1648    Narrative:      EXAMINATION: CT CEREBRAL PERFUSION W WO CONTRAST-      CLINICAL INDICATION:Neuro deficit, acute, stroke suspected     COMPARISON: CT, CTA same day     TECHNIQUE: Axial computed tomography images of the brain without and  with intravenous contrast using cerebral perfusion protocol.   Post-processing parametric maps were created using [employer] software and  reviewed.  Sagittal and coronal reformatted images were created and  reviewed.  This CT exam was performed using one or more of the following  dose reduction techniques:  automated exposure control,  adjustment of  the mA and/or kV according to patient size, and/or use of iterative  reconstruction technique.     FINDINGS:     Arterial input function is optimal.      PERFUSION PARAMETERS:  Ischemic tissue volume (Tmax > 6 sec.): 4 mL.  Infarct core volume (CBF < 30%): 0  mL.  Mismatch (penumbra) volume: 0  mL.  Mismatch ratio: Not applicable.  Location/territory: Left frontal lobe.     COLLATERAL CIRCULATION PARAMETERS:  Volume poor collateral perfusion (Tmax > 10 sec.): 0  mL.  Hypoperfusion index (Tmax >10 sec./Tmax > 6 sec.): 0   CBV Index (rCBV in Tmax > 6 sec. region): 0.6     OTHER:  No additional remarkable findings.       Impression:      Artifact versus ischemia parameters in the left frontal lobe. No core  infarct identified.        This report was finalized on 4/27/2024 4:46 PM by Dr. Eric Stokes MD.       CT Angiogram Head w AI Analysis of LVO [[id number]] Collected: 04/27/24 1641     Updated: 04/27/24 1645    Narrative:      EXAM:    CT Angiography Head With Intravenous Contrast     EXAM DATE:    4/27/2024 4:08 PM     CLINICAL HISTORY:    Stroke, follow up     TECHNIQUE:    Axial computed tomographic angiography images of the head with  intravenous contrast. LVO analysis was performed with [email or web address] software.   This CT exam was performed using one or more of the following dose  reduction techniques:  automated exposure control, adjustment of the mA  and/or kV according to patient size, and/or use of iterative  reconstruction technique.    MIP reconstructed images were created and reviewed.     COMPARISON:    No relevant prior studies available.     FINDINGS:    Right internal carotid artery:  Moderate calcifications intracranial  right ICA segments without significant stenosis or occlusion.  No  aneurysm.    Right anterior cerebral artery:  Unremarkable as visualized.  No  occlusion or significant stenosis.  No aneurysm.    Right middle cerebral artery:  Unremarkable as visualized.  No  occlusion or  significant stenosis.  No aneurysm.    Right posterior cerebral artery:  Unremarkable as visualized.  No  occlusion or significant stenosis.  No aneurysm.    Right vertebral artery:  Right vertebral artery dominance incidentally  noted.       Left internal carotid artery:  Moderate calcifications of the left  intracranial ICA segments without significant stenosis or occlusion.  No  aneurysm.    Left anterior cerebral artery:  Unremarkable as visualized.  No  occlusion or significant stenosis.  No aneurysm.    Left middle cerebral artery:  Unremarkable as visualized.  No  occlusion or significant stenosis.  No aneurysm.    Left posterior cerebral artery:  Unremarkable as visualized.  No  occlusion or significant stenosis.  No aneurysm.    Left vertebral artery:  Unremarkable as visualized.       Basilar artery:  Unremarkable as visualized.  No occlusion or  significant stenosis.  No aneurysm.       Impression:        Essentially unremarkable CTA of the brain with no large vessel  occlusion.        This report was finalized on 4/27/2024 4:42 PM by Dr. Eric Stokes MD.       CT Angiogram Neck [[id number]] Collected: 04/27/24 1639     Updated: 04/27/24 1643    Narrative:      EXAM:    CT Angiography Neck With Intravenous Contrast     EXAM DATE:    4/27/2024 4:08 PM     CLINICAL HISTORY:    Stroke, follow up     TECHNIQUE:    Axial computed tomographic angiography images of the neck with  intravenous contrast.  This CT exam was performed using one or more of  the following dose reduction techniques:  automated exposure control,  adjustment of the mA and/or kV according to patient size, and/or use of  iterative reconstruction technique.    MIP reconstructed images were created and reviewed.     COMPARISON:    None.     FINDINGS:      VASCULATURE:    Right common carotid artery:  Unremarkable as visualized.  No  occlusion or significant stenosis.  No dissection.    Right internal carotid artery:  Tortuosity right ICA. No  significant  stenosis or occlusion.  Mild plaque proximal right ICA without  significant stenosis or occlusion.    Right external carotid artery:  Unremarkable as visualized.  No  occlusion.    Right vertebral artery:  Right vertebral artery dominant system.  No  occlusion or significant stenosis.  No dissection.       Left common carotid artery:  Unremarkable as visualized.  No occlusion  or significant stenosis.  No dissection.    Left internal carotid artery:  Mild calcified plaque proximal left ICA  without significant stenosis or occlusion.    Left external carotid artery:  Unremarkable as visualized.  No  occlusion.    Left vertebral artery:  Unremarkable as visualized.  No occlusion or  significant stenosis.  No dissection.    Aorta:  Four-vessel arch configuration incidentally noted.      NECK:    Bones/joints:  Degenerative changes cervical spine with multilevel  stenosis.    Soft tissues:  Unremarkable as visualized.    Lung apices:  Bilateral upper lobe airspace disease most consistent  with pneumonia with nodular type opacities also present.      CAROTID STENOSIS REFERENCE USING NASCET CRITERIA:    % ICA stenosis = (1 - narrowest ICA diameter/diameter of distal  cervical ICA) x 100.    Mild - <50% stenosis.    Moderate - 50-69% stenosis.    Severe - 70-94% stenosis.    Near occlusion - 95-99% stenosis.    Occluded - 100% stenosis.       Impression:         1. Minimal plaque both carotid systems. No significant stenosis or  occlusion.  2. Vertebral arteries appear within normal limits with right vertebral  artery dominance noted.  3. Bilateral upper lobe airspace disease most consistent with pneumonia  with nodular type opacities also present.  4. Other nonacute findings above.        This report was finalized on 4/27/2024 4:41 PM by Dr. Eric Stokes MD.       CT Head Without Contrast Stroke Protocol [[id number]] Collected: 04/27/24 1621     Updated: 04/27/24 1624    Narrative:      EXAM:    CT Head  Without Intravenous Contrast     EXAM DATE:    4/27/2024 4:08 PM     CLINICAL HISTORY:    Neuro deficit, acute stroke suspected     TECHNIQUE:    Axial computed tomography images of the head/brain without intravenous  contrast.  Sagittal and coronal reformatted images were created and  reviewed.  This CT exam was performed using one or more of the following  dose reduction techniques:  automated exposure control, adjustment of  the mA and/or kV according to patient size, and/or use of iterative  reconstruction technique.     COMPARISON:    No relevant prior studies available.     FINDINGS:    Brain and extra-axial spaces:  Mild cerebral atrophy.  Moderate  chronic small vessel ischemic disease.  No hemorrhage.    Bones/joints:  Unremarkable as visualized.  No acute fracture.    Soft tissues:  Unremarkable as visualized.    Sinuses:  Mild chronic paranasal sinus disease.    Mastoid air cells:  Unremarkable as visualized.  No mastoid effusion.       Impression:      1.  No CT evidence of acute intracranial abnormality.  2.  Moderate chronic small vessel ischemic disease and mild age-related  cerebral atrophy noted.  3.  Mild chronic paranasal sinus disease.        This report was finalized on 4/27/2024 4:21 PM by Dr. Eric Stokes MD.               I have personally reviewed the patient's radiologic imaging.    EKG:   Sinus tachycardia with premature atrial complexes, , QTc 463  Otherwise normal ECG  When compared with ECG of 24-APR-2024 13:05,  premature atrial complexes are now present  Vent. rate has increased BY  42 BPM  Criteria for Septal infarct are no longer present  Nonspecific T wave abnormality, improved in Lateral leads    I have personally reviewed the patient's EKG. No overt ST changes appreciated.     Assessment & Plan    - Severe sepsis, present on admission with fever, tachycardia, leukocytosis, lactic acidosis, acute encephalopathy and RUI, secondary to pyelonephritis. Infection could have  already been developing secondary to obstructing ureteral stones that were removed yesterday. Also could have been introduced via instrumentation from cystoscopy/lithotripsy/ureteral stent placement. Therefore will treat broadly with IV vancomycin and zosyn while awaiting urine and blood culture results. Continue to trend fever curve and inflammatory markers. Continue IV fluid hydration.   - RUI, secondary to obstructing left ureteral stones: creatinine actually slightly better since lithotripsy and ureteral stent placement. Hydronephrosis less pronounced on CT imaging as well. Continue IV fluid hydration and continue to monitor creatinine closely.  - Small pericardial effusion: BNP normal. Evaluate further with ECHO in the morning.   - Incidentally seen gallbladder distention with cholelithiasis but no other signs of acute cholecystitis: LFTs normal. No RUQ tenderness on exam. No further workup planned at this time.   - Asthma/black lung: no respiratory complaints at this time. O2 sat adequate on RA. Lungs fairly clear on exam. CT imaging comments on changes consistent with sarcoid, though black lung could also be contributing to said findings. Continue to monitor respiratory status closely.   - Type II DM: monitor accuchecks, cover with SSI.     DVT Prophylaxis: SCDs    Estimated Length of Stay >2 midnights    I discussed the patient's findings, assessment and plan with the patient, his family at bedside, and ED provider Dr Mccallum.    Patient is high risk due to severe sepsis, left pyelonephritis, acute encephalopathy    Jamin Soto MD  24  22:12 EDT      Electronically signed by Jamin Soto MD at 24 [phone]          Physician Progress Notes (most recent note)        Justin Aguilar DO at 24              [facility]     Patient Identification:  Name:  Jamin Julio  Age:  73 y.o.  Sex:  male  :  1950  MRN:  [id number]  Visit  Number:  [id number]  ROOM: [id number]     Primary Care Provider:  Christophe Jamil PA-C    Length of stay in inpatient status:  3    Subjective     Chief Compliant:    Chief Complaint   Patient presents with    Altered Mental Status       History of Presenting Illness: Patient seen and evaluated in follow-up for ESBL bacteremia felt to be of urologic origin given recent obstructing ureteral stone status post stent placement.  Patient time evaluation resting comfortably in bed with no acute complaints.  Lab work trending appropriately and patient significantly improved with no further fever.    Objective     Current Hospital Meds:  aspirin, 81 mg, Oral, Daily  budesonide-formoterol, 2 puff, Inhalation, BID  carvedilol, 3.125 mg, Oral, BID With Meals  cetirizine, 10 mg, Oral, Daily  cholecalciferol, 1,000 Units, Oral, Daily  enoxaparin, 40 mg, Subcutaneous, Nightly  ertapenem, 1,000 mg, Intravenous, Q24H  finasteride, 5 mg, Oral, Daily  insulin glargine, 5 Units, Subcutaneous, Nightly  insulin lispro, 2-9 Units, Subcutaneous, 4x Daily AC & at Bedtime  latanoprost, 1 drop, Both Eyes, Daily  lisinopril, 20 mg, Oral, Q24H  lubiprostone, 8 mcg, Oral, BID  montelukast, 10 mg, Oral, Nightly  mupirocin, 1 application , Each Nare, BID  oxybutynin XL, 5 mg, Oral, Daily  pantoprazole, 40 mg, Oral, Q AM  rosuvastatin, 10 mg, Oral, Daily  sodium chloride, 10 mL, Intravenous, Q12H  sodium chloride, 10 mL, Intravenous, Q12H  tamsulosin, 0.4 mg, Oral, Daily      sodium chloride, 100 mL/hr, Last Rate: 100 mL/hr (04/30/24 0318)      ----------------------------------------------------------------------------------------------------------------------  Vital Signs:  Temp:  [97.8 °F (36.6 °C)-99.4 °F (37.4 °C)] 98.2 °F (36.8 °C)  Heart Rate:  [68-84] 73  Resp:  [18-20] 18  BP: (148-172)/(64-75) 170/75  SpO2:  [98 %-99 %] 98 %  on   ;   Device (Oxygen Therapy): room air  Body mass index is 24.57 kg/m².      Intake/Output Summary (Last 24  hours) at 4/30/2024 1930  Last data filed at 4/30/2024 1200  Gross per 24 hour   Intake 720 ml   Output --   Net 720 ml      ----------------------------------------------------------------------------------------------------------------------  Physical exam:  Constitutional: Elderly adult male resting in bed in no distress.      HENT:  Head:  Normocephalic and atraumatic.  Mouth:  Moist mucous membranes.    Eyes:  Conjunctivae and EOM are normal. No scleral icterus.    Cardiovascular:  Normal rate, regular rhythm and normal heart sounds with no murmur.  Pulmonary/Chest:  No respiratory distress, no wheezes, no crackles, with normal breath sounds and good air movement.  Abdominal:  Soft.  Bowel sounds are normal.  No distension but mild tenderness to palpation in the right lower quadrant bordering on suprapubic region.   Musculoskeletal:  No tenderness, and no deformity.  No red or swollen joints anywhere.   Neurological:  Alert and oriented to person, place, and time.  No cranial nerve deficit.  No tongue deviation.  No facial droop.  No slurred speech. Intact Sensation throughout  Skin:  Skin is warm and dry. No rash or lesion noted. No pallor.   Peripheral vascular:  Pulses in all 4 extremities with no clubbing, no cyanosis, no edema.  Psychiatric: Appropriate mood and affect, pleasant.   ----------------------------------------------------------------------------------------------------------------------  WBC/HGB/HCT/PLT   13.79/10.1/31.7/141 (04/30 0024)  BUN/CREAT/GLUC/ALT/AST/EMBER/LIP    18/1.24/157/--/--/--/-- (04/30 0024)  LYTES - Na/K/Cl/CO2: 140/3.6/105/23.4 (04/30 0024)        Urine Culture   Date Value Ref Range Status   04/27/2024 25,000 CFU/mL Gram Negative Bacilli (A)  Preliminary     Blood Culture   Date Value Ref Range Status   04/28/2024 No growth at 24 hours  Preliminary   04/28/2024 No growth at 24 hours  Preliminary   04/27/2024 Escherichia coli (C)  Preliminary   04/27/2024 Escherichia coli (C)   Preliminary       I have personally looked at the labs and they are summarized above.  ----------------------------------------------------------------------------------------------------------------------  Detailed radiology reports for the last 24 hours:  FL Video Swallow Single Contrast    Result Date: 4/29/2024      Please see the speech pathologist's report for additional information.   This report was finalized on 4/29/2024 2:37 PM by Dr. Fernando Wise MD.     Assessment & Plan      ESBL bacteremia  Acute kidney injury, resolved  Obstructing ureteral stone s/p stent    -Urine culture 25,000 colonies of units of gram-negative bacilli with blood cultures showing E. coli in 2 out of 2 bottles with evidence of ESBL detection on PCR.    -Patient initiated on ertapenem once daily 1 g x 14 days through 5/11/2024    -Repeat blood cultures thus far with no growth to date     -With successful midline placement to complete a 14-day course of-IV antibiotic therapy.    -Plan for discharge home tomorrow if IV antibiotic therapy able to be arranged    Diabetes mellitus type 2    -Continue basal and bolus insulin as needed-pain glycemic control.        Copied text in portions of the note has been reviewed and is accurate as of 04/30/24    VTE Prophylaxis:   Mechanical Order History:        Ordered        04/27/24 2158  Place Sequential Compression Device  Once,   Status:  Canceled            04/27/24 2158  Maintain Sequential Compression Device  Continuous,   Status:  Canceled                          Pharmalogical Order History:        Ordered     Dose Route Frequency Stop    04/29/24 1919  Enoxaparin Sodium (LOVENOX) syringe 40 mg         40 mg SC Nightly --    04/29/24 1917  Pharmacy to Dose enoxaparin (LOVENOX)  Status:  Discontinued        Question:  Indication of use  Answer:  Prophylaxis    -- XX Continuous PRN 04/29/24 1920                    Disposition Home in the next 24 hours    Justin Aguilar DO  [facility]  [place] Hospitalist  24  19:30 EDT      Electronically signed by Justin Aguilar DO at 24 1935          Consult Notes (most recent note)        Teresita Alcaraz APRN at 24 1336        Consult Orders    1. Inpatient Infectious Diseases Consult [[id number]] ordered by Navin Carlos MD at 24 1140                         INFECTIOUS DISEASE CONSULTATION REPORT        Patient Identification:  Name:  Jamin Julio  Age:  73 y.o.  Sex:  male  :  1950  MRN:  [id number]   Visit Number:  [id number]  Primary Care Physician:  Christophe Jamil PA-C        Referring Provider: Dr. Carlos    Reason for consult: ESBL bacteremia       LOS: 1 day        Subjective       Subjective     History of present illness:      Thank you Dr. Carlos for allowing us to participate in the care of your patient.  As you well know, Mr. Jamin Julio is a 73 y.o. male with past medical history significant for asthma, black lung, arthritis, type 2 diabetes, hypertension, hyperlipidemia, GERD, JUANCARLOS and kidney stones who recently underwent cystoscopy with lithotripsy of left obstructing ureteral stones and left ureteral stent placement., who presented to [facility] Emergency Department on 2024 for confusion, incontinence, fever.  WBC 15.87.  CRP 15.00.  Urinalysis from 2024 positive culture currently in progress.  Blood cultures from 2024 2 out of 2 sets positive for ESBL E. coli.  Lactic acid 2.8 on admission.  Chest x-ray from 2024 small nodular opacities scattered throughout the lungs.  CT of the chest reports constellation of findings compatible with sarcoidosis, small pericardial effusion, CT of the abdomen pelvis, mild to moderate left hydronephrosis with double-J ureteral stent in place, defect in the left renal pelvis is likely due to a small clot from the recent intervention rather than neoplasm, mildly distended gallbladder with numerous calculi.      Infectious Disease consultation  was requested for antimicrobial management.      ---------------------------------------------------------------------------------------------------------------------     Review Of Systems:    Constitutional: Positive fever, no chills and night sweats. No appetite change or unexpected weight change. No fatigue.  Eyes: no eye drainage, itching or redness.  HEENT: no mouth sores, dysphagia or nose bleed.  Respiratory: no for shortness of breath, cough or production of sputum.  Cardiovascular: no chest pain, no palpitations, no orthopnea.  Gastrointestinal: no nausea, vomiting or diarrhea. No abdominal pain, hematemesis or rectal bleeding.  Genitourinary: no dysuria or polyuria.  Hematologic/lymphatic: no lymph node abnormalities, no easy bruising or easy bleeding.  Musculoskeletal: no muscle or joint pain.  Skin: No rash and no itching.  Neurological: no loss of consciousness, no seizure, no headache.  Behavioral/Psych: no depression or suicidal ideation.  Endocrine: no hot flashes.  Immunologic: negative.    ---------------------------------------------------------------------------------------------------------------------     Past Medical History    Past Medical History:   Diagnosis Date    Arthritis     Asthma     Black lung disease     Diabetes mellitus     Elevated cholesterol     GERD (gastroesophageal reflux disease)     Hypertension     Kidney stone     Sleep apnea     no c-pap       Past Surgical History    Past Surgical History:   Procedure Laterality Date    CATARACT EXTRACTION WITH INTRAOCULAR LENS IMPLANT Bilateral     COLONOSCOPY      CYST REMOVAL Right     hip       Family History    Family History   Problem Relation Age of Onset    Prostate cancer Father        Social History    Social History     Tobacco Use    Smoking status: Never     Passive exposure: Never    Smokeless tobacco: Current     Types: Snuff   Vaping Use    Vaping status: Never Used   Substance Use Topics    Alcohol use: Never    Drug  use: Never       Allergies    Patient has no known allergies.  ---------------------------------------------------------------------------------------------------------------------     Home Medications:    Prior to Admission Medications       Prescriptions Last Dose Informant Patient Reported? Taking?    albuterol sulfate  (90 Base) MCG/ACT inhaler   Yes No    Inhale 2 puffs Every 4 (Four) Hours As Needed for Shortness of Air.    Aspirin Low Dose 81 MG EC tablet   Yes No    Take 1 tablet by mouth Daily.    budesonide-formoterol (SYMBICORT) 160-4.5 MCG/ACT inhaler   Yes No    Inhale 2 puffs 2 (Two) Times a Day. Rinse mouth after each use    carvedilol (COREG) 3.125 MG tablet   Yes No    Take 1 tablet by mouth 2 (Two) Times a Day With Meals.    glimepiride (AMARYL) 1 MG tablet   Yes No    1 tablet.    HYDROcodone-acetaminophen (NORCO)  MG per tablet   No No    Take 1 tablet by mouth Every 6 (Six) Hours As Needed for Moderate Pain (Pain).    Januvia 100 MG tablet   Yes No    1 tablet.    lactulose (CHRONULAC) 10 GM/15ML solution   No No    Take 30 mL by mouth 2 (Two) Times a Day As Needed (for constipation).    latanoprost (XALATAN) 0.005 % ophthalmic solution   Yes No    INSTILL 1 DROP IN BOTH EYES EVERY DAY IN THE EVENING    levocetirizine (XYZAL) 5 MG tablet   Yes No    Take 1 tablet by mouth every night at bedtime.    linaclotide (LINZESS) 145 MCG capsule capsule   No No    Take 1 capsule by mouth Every Morning Before Breakfast.    lisinopril (PRINIVIL,ZESTRIL) 20 MG tablet   Yes No    Take 1 tablet by mouth every night at bedtime.    meloxicam (MOBIC) 15 MG tablet   Yes No    metFORMIN (GLUCOPHAGE) 500 MG tablet   Yes No    take 1 tablet by mouth twice daily with the morning and evening meal    metoprolol tartrate (LOPRESSOR) 25 MG tablet   Yes No    Take  by mouth.    Mirabegron ER (MYRBETRIQ) 25 MG tablet sustained-release 24 hour 24 hr tablet   No No    Take 1 tablet by mouth Daily.    montelukast  (SINGULAIR) 10 MG tablet   Yes No    omeprazole (priLOSEC) 20 MG capsule   Yes No    TAKE 1 CAPSULE BY MOUTH EVERY DAY 30 MINUTES TO 1 HOUR BEFORE A MEAL    rosuvastatin (CRESTOR) 10 MG tablet   Yes No    Take 1 tablet by mouth Daily.    tamsulosin (FLOMAX) 0.4 MG capsule 24 hr capsule   No No    Take 1 capsule by mouth Daily.          ---------------------------------------------------------------------------------------------------------------------    Objective       Objective     Hospital Scheduled Meds:  insulin lispro, 2-9 Units, Subcutaneous, 4x Daily AC & at Bedtime  meropenem, 1,000 mg, Intravenous, Q12H  sodium chloride, 10 mL, Intravenous, Q12H      sodium chloride, 100 mL/hr, Last Rate: 100 mL/hr (04/28/24 0937)      ---------------------------------------------------------------------------------------------------------------------   Vital Signs:  Temp:  [97.7 °F (36.5 °C)-102.9 °F (39.4 °C)] 100.3 °F (37.9 °C)  Heart Rate:  [] 81  Resp:  [14-18] 18  BP: (133-199)/(60-82) 133/60  Mean Arterial Pressure (Non-Invasive) for the past 24 hrs (Last 3 readings):   Noninvasive MAP (mmHg)   04/28/24 1135 80   04/28/24 0635 95   04/27/24 2032 104     SpO2 Percentage    04/27/24 2032 04/28/24 0635 04/28/24 1135   SpO2: 97% 96% 99%     SpO2:  [93 %-99 %] 99 %  on   ;   Device (Oxygen Therapy): room air    Body mass index is 24.28 kg/m².  Wt Readings from Last 3 Encounters:   04/28/24 70.3 kg (155 lb)   04/26/24 71.7 kg (158 lb)   04/19/24 72.1 kg (159 lb)     ---------------------------------------------------------------------------------------------------------------------     Physical Exam:    Constitutional: Elderly  male.  Currently on on room air with no apparent distress.    HENT:  Head: Normocephalic and atraumatic.  Mouth:  Moist mucous membranes.    Eyes:  Conjunctivae and EOM are normal.  No scleral icterus.  Neck:  Neck supple.  No JVD present.    Cardiovascular:  Normal rate, regular  rhythm and normal heart sounds with no murmur. No edema.  Pulmonary/Chest:  No respiratory distress, no wheezes, no crackles, with normal breath sounds and good air movement.  Abdominal:  Soft.  Bowel sounds are normal.  No distension and no tenderness.   Musculoskeletal:  No edema, no tenderness, and no deformity.  No swelling or redness of joints.  Neurological:  Alert and oriented to person, place only.  Skin:  Skin is warm and dry.  No rash noted.  No pallor.   Psychiatric:  Normal mood and affect.  Behavior is normal.    ---------------------------------------------------------------------------------------------------------------------    Results from last 7 days   Lab Units 04/27/24 1955 04/27/24 1703 04/27/24 1617   HSTROP T ng/L 24* 26* 25*     Results from last 7 days   Lab Units 04/27/24  1703   PROBNP pg/mL 291.2         Results from last 7 days   Lab Units 04/28/24 0203 04/27/24 1955 04/27/24 1703 04/27/24 1617 04/24/24  1403   CRP mg/dL 15.00*  --  5.14*  --   --    LACTATE mmol/L  --  1.4 2.8*  --   --    WBC 10*3/mm3 15.87*  --   --  15.54* 8.92   HEMOGLOBIN g/dL 11.0*  --   --  11.7* 11.0*   HEMATOCRIT % 34.7*  --   --  35.7* 34.2*   MCV fL 87.8  --   --  87.9 89.5   MCHC g/dL 31.7  --   --  32.8 32.2   PLATELETS 10*3/mm3 143  --   --  146 168   INR   --   --   --  1.00  --      Results from last 7 days   Lab Units 04/28/24 0203 04/27/24 1703 04/27/24  1617 04/24/24  1403   SODIUM mmol/L 143  --  136 141   POTASSIUM mmol/L 4.1  --  4.2 4.6   MAGNESIUM mg/dL  --  1.7  --   --    CHLORIDE mmol/L 105  --  100 104   CO2 mmol/L 25.7  --  22.1 28.1   BUN mg/dL 19  --  25* 24*   CREATININE mg/dL 1.62*  --  1.57* 1.77*   CALCIUM mg/dL 9.0  --  9.3 9.7   GLUCOSE mg/dL 127*  --  299* 143*   ALBUMIN g/dL 3.9  --  4.0  --    BILIRUBIN mg/dL 0.5  --  0.3  --    ALK PHOS U/L 82  --  85  --    AST (SGOT) U/L 19  --  22  --    ALT (SGPT) U/L 20  --  23  --    Estimated Creatinine Clearance: 39.8 mL/min (A)  "(by C-G formula based on SCr of 1.62 mg/dL (H)).  No results found for: \"AMMONIA\"    Hemoglobin A1C   Date/Time Value Ref Range Status   04/27/2024 1703 7.10 (H) 4.80 - 5.60 % Final     Glucose   Date/Time Value Ref Range Status   04/28/2024 1205 120 70 - 130 mg/dL Final   04/28/2024 0641 190 (H) 70 - 130 mg/dL Final   04/27/2024 2327 208 (H) 70 - 130 mg/dL Final   04/27/2024 1603 284 (H) 70 - 130 mg/dL Final   04/26/2024 0934 151 (H) 70 - 130 mg/dL Final     Lab Results   Component Value Date    HGBA1C 7.10 (H) 04/27/2024     Lab Results   Component Value Date    TSH 0.918 04/27/2024    FREET4 1.37 04/27/2024       Blood Culture   Date Value Ref Range Status   04/27/2024 Abnormal Stain (C)  Preliminary   04/27/2024 Abnormal Stain (C)  Preliminary     Urine Culture   Date Value Ref Range Status   04/27/2024 Culture in progress  Preliminary     No results found for: \"WOUNDCX\"  No results found for: \"STOOLCX\"  No results found for: \"RESPCX\"  Pain Management Panel           No data to display              I have personally reviewed the above laboratory results.   ---------------------------------------------------------------------------------------------------------------------  Imaging Results (Last 7 Days)       Procedure Component Value Units Date/Time    CT Chest Without Contrast Diagnostic [[id number]] Collected: 04/27/24 1758     Updated: 04/27/24 1813    Narrative:      CLINICAL HISTORY: COPD suspected, abdominal pain.     COMPARISON: CT of the abdomen and pelvis on 4/26/2024.     TECHNIQUE: Noncontrast CT of the chest, abdomen and pelvis was obtained.   Multiplanar reformats were generated Limited exposure control,  adjustment of the mA and/or KV according to patient size or use of  iterative reconstruction technique was utilized.     FINDINGS:     CT CHEST:     Pulmonary arteries: normal.      Heart and pericardium: Normal heart size.  Pericardial effusion.   Extensive coronary calcifications..     Aorta: " normal.     Esophagus: normal.     Lungs and airways: Upper lobe predominant nodules and areas of  consolidation along the bronchovascular bundles with mild volume loss  and scarring.  Subsegmental dependent bibasilar atelectasis is noted.   Central airways are patent.     Pleura: normal.     Lymph nodes: Mediastinal and hilar lymph nodes are calcified.     Musculoskeletal and chest wall: no acute abnormality.     Other: n/a        CT ABDOMEN AND PELVIS:     Hepatobiliary: Gallbladder is moderately distended and contains numerous  calculi.  The liver is normal.     Pancreas: normal.     Spleen: Calcified granulomata are scattered to the otherwise normal  spleen.     Adrenals: normal.      Kidneys, ureters, bladder: Since the prior study, the patient has  undergone placement of a double-J ureteral stent in the left renal  collecting system, with the proximal loop in the upper pole calyx and  the distal loop in the urinary bladder.  The stone seen on the prior  study are not as well-seen on this exam and may have been removed or not  visible due to the contrast media in the renal collecting system.   Filling defects in the renal pelvis may be due to small clot from a  recent intervention.  Correlate with the intra-procedural retrograde  urogram.  The right kidney and ureter are normal.  Urinary bladder is  mildly trabeculated.     Reproductive: Ag gland is mildly enlarged at 5.6 cm in diameter and  is partially calcified.     GI tract/Bowel: Sigmoid diverticulosis without findings for acute  diverticulitis.  No bowel obstruction.  No pneumatosis or portal venous  gas..     Appendix: normal.     Peritoneum: normal, no free air or fluid.     Vascular: Aortoiliac atherosclerosis.     Lymph nodes: normal.     Musculoskeletal and abdominal wall: Multilevel degenerative changes in  the lumbar spine       Impression:         CT CHEST:  CONSTELLATION OF FINDINGS COMPATIBLE WITH SARCOIDOSIS.     SMALL PERICARDIAL  EFFUSION.     CT ABDOMEN AND PELVIS:  MILD TO MODERATE LEFT HYDRONEPHROSIS WITH DOUBLE-J URETERAL STENT IN  PLACE.     DEFECT IN THE LEFT RENAL PELVIS IS LIKELY DUE TO A SMALL CLOT FROM THE  RECENT INTERVENTION RATHER THAN A NEOPLASM.  CORRELATE WITH THE PREVIOUS  INTRAOPERATIVE UROGRAM.     MILDLY DISTENDED GALLBLADDER WITH NUMEROUS CALCULI.  NO ADDITIONAL  FINDINGS TO SUGGEST ACUTE CHOLECYSTITIS.     This report was finalized on 4/27/2024 6:10 PM by Trish Chang MD.       CT Abdomen Pelvis Without Contrast [[id number]] Collected: 04/27/24 1758     Updated: 04/27/24 1813    Narrative:      CLINICAL HISTORY: COPD suspected, abdominal pain.     COMPARISON: CT of the abdomen and pelvis on 4/26/2024.     TECHNIQUE: Noncontrast CT of the chest, abdomen and pelvis was obtained.   Multiplanar reformats were generated Limited exposure control,  adjustment of the mA and/or KV according to patient size or use of  iterative reconstruction technique was utilized.     FINDINGS:     CT CHEST:     Pulmonary arteries: normal.      Heart and pericardium: Normal heart size.  Pericardial effusion.   Extensive coronary calcifications..     Aorta: normal.     Esophagus: normal.     Lungs and airways: Upper lobe predominant nodules and areas of  consolidation along the bronchovascular bundles with mild volume loss  and scarring.  Subsegmental dependent bibasilar atelectasis is noted.   Central airways are patent.     Pleura: normal.     Lymph nodes: Mediastinal and hilar lymph nodes are calcified.     Musculoskeletal and chest wall: no acute abnormality.     Other: n/a        CT ABDOMEN AND PELVIS:     Hepatobiliary: Gallbladder is moderately distended and contains numerous  calculi.  The liver is normal.     Pancreas: normal.     Spleen: Calcified granulomata are scattered to the otherwise normal  spleen.     Adrenals: normal.      Kidneys, ureters, bladder: Since the prior study, the patient has  undergone placement of a double-J  ureteral stent in the left renal  collecting system, with the proximal loop in the upper pole calyx and  the distal loop in the urinary bladder.  The stone seen on the prior  study are not as well-seen on this exam and may have been removed or not  visible due to the contrast media in the renal collecting system.   Filling defects in the renal pelvis may be due to small clot from a  recent intervention.  Correlate with the intra-procedural retrograde  urogram.  The right kidney and ureter are normal.  Urinary bladder is  mildly trabeculated.     Reproductive: Ag gland is mildly enlarged at 5.6 cm in diameter and  is partially calcified.     GI tract/Bowel: Sigmoid diverticulosis without findings for acute  diverticulitis.  No bowel obstruction.  No pneumatosis or portal venous  gas..     Appendix: normal.     Peritoneum: normal, no free air or fluid.     Vascular: Aortoiliac atherosclerosis.     Lymph nodes: normal.     Musculoskeletal and abdominal wall: Multilevel degenerative changes in  the lumbar spine       Impression:         CT CHEST:  CONSTELLATION OF FINDINGS COMPATIBLE WITH SARCOIDOSIS.     SMALL PERICARDIAL EFFUSION.     CT ABDOMEN AND PELVIS:  MILD TO MODERATE LEFT HYDRONEPHROSIS WITH DOUBLE-J URETERAL STENT IN  PLACE.     DEFECT IN THE LEFT RENAL PELVIS IS LIKELY DUE TO A SMALL CLOT FROM THE  RECENT INTERVENTION RATHER THAN A NEOPLASM.  CORRELATE WITH THE PREVIOUS  INTRAOPERATIVE UROGRAM.     MILDLY DISTENDED GALLBLADDER WITH NUMEROUS CALCULI.  NO ADDITIONAL  FINDINGS TO SUGGEST ACUTE CHOLECYSTITIS.     This report was finalized on 4/27/2024 6:10 PM by Trish Chang MD.       XR Chest 1 View [[id number]] Collected: 04/27/24 1756     Updated: 04/27/24 1759    Narrative:      CLINICAL HISTORY: Altered mental status.     COMPARISON: CT chest 04/27/2024.     TECHNIQUE: Single portable upright AP view of the chest was obtained.     FINDINGS: Small nodular opacities are scattered the lungs.  No  pleural  effusion or pneumothorax is seen.  Heart size is mildly enlarged.  No  acute osseous or upper abdominal abnormality is seen.       Impression:         SMALL NODULAR OPACITIES SCATTERED THROUGH THE LUNGS. CT HAS ALREADY BEEN  PERFORMED.     CARDIOMEGALY.           This report was finalized on 4/27/2024 5:57 PM by Trish Chang MD.       CT CEREBRAL PERFUSION WITH & WITHOUT CONTRAST [[id number]] Collected: 04/27/24 1646     Updated: 04/27/24 1648    Narrative:      EXAMINATION: CT CEREBRAL PERFUSION W WO CONTRAST-      CLINICAL INDICATION:Neuro deficit, acute, stroke suspected     COMPARISON: CT, CTA same day     TECHNIQUE: Axial computed tomography images of the brain without and  with intravenous contrast using cerebral perfusion protocol.   Post-processing parametric maps were created using [employer] software and  reviewed.  Sagittal and coronal reformatted images were created and  reviewed.  This CT exam was performed using one or more of the following  dose reduction techniques:  automated exposure control, adjustment of  the mA and/or kV according to patient size, and/or use of iterative  reconstruction technique.     FINDINGS:     Arterial input function is optimal.      PERFUSION PARAMETERS:  Ischemic tissue volume (Tmax > 6 sec.): 4 mL.  Infarct core volume (CBF < 30%): 0  mL.  Mismatch (penumbra) volume: 0  mL.  Mismatch ratio: Not applicable.  Location/territory: Left frontal lobe.     COLLATERAL CIRCULATION PARAMETERS:  Volume poor collateral perfusion (Tmax > 10 sec.): 0  mL.  Hypoperfusion index (Tmax >10 sec./Tmax > 6 sec.): 0   CBV Index (rCBV in Tmax > 6 sec. region): 0.6     OTHER:  No additional remarkable findings.       Impression:      Artifact versus ischemia parameters in the left frontal lobe. No core  infarct identified.        This report was finalized on 4/27/2024 4:46 PM by Dr. Eric Stokes MD.       CT Angiogram Head w AI Analysis of LVO [[id number]] Collected: 04/27/24 1641      Updated: 04/27/24 1645    Narrative:      EXAM:    CT Angiography Head With Intravenous Contrast     EXAM DATE:    4/27/2024 4:08 PM     CLINICAL HISTORY:    Stroke, follow up     TECHNIQUE:    Axial computed tomographic angiography images of the head with  intravenous contrast. LVO analysis was performed with [employer] software.   This CT exam was performed using one or more of the following dose  reduction techniques:  automated exposure control, adjustment of the mA  and/or kV according to patient size, and/or use of iterative  reconstruction technique.    MIP reconstructed images were created and reviewed.     COMPARISON:    No relevant prior studies available.     FINDINGS:    Right internal carotid artery:  Moderate calcifications intracranial  right ICA segments without significant stenosis or occlusion.  No  aneurysm.    Right anterior cerebral artery:  Unremarkable as visualized.  No  occlusion or significant stenosis.  No aneurysm.    Right middle cerebral artery:  Unremarkable as visualized.  No  occlusion or significant stenosis.  No aneurysm.    Right posterior cerebral artery:  Unremarkable as visualized.  No  occlusion or significant stenosis.  No aneurysm.    Right vertebral artery:  Right vertebral artery dominance incidentally  noted.       Left internal carotid artery:  Moderate calcifications of the left  intracranial ICA segments without significant stenosis or occlusion.  No  aneurysm.    Left anterior cerebral artery:  Unremarkable as visualized.  No  occlusion or significant stenosis.  No aneurysm.    Left middle cerebral artery:  Unremarkable as visualized.  No  occlusion or significant stenosis.  No aneurysm.    Left posterior cerebral artery:  Unremarkable as visualized.  No  occlusion or significant stenosis.  No aneurysm.    Left vertebral artery:  Unremarkable as visualized.       Basilar artery:  Unremarkable as visualized.  No occlusion or  significant stenosis.  No aneurysm.        Impression:        Essentially unremarkable CTA of the brain with no large vessel  occlusion.        This report was finalized on 4/27/2024 4:42 PM by Dr. Eric Stokes MD.       CT Angiogram Neck [[id number]] Collected: 04/27/24 1639     Updated: 04/27/24 1643    Narrative:      EXAM:    CT Angiography Neck With Intravenous Contrast     EXAM DATE:    4/27/2024 4:08 PM     CLINICAL HISTORY:    Stroke, follow up     TECHNIQUE:    Axial computed tomographic angiography images of the neck with  intravenous contrast.  This CT exam was performed using one or more of  the following dose reduction techniques:  automated exposure control,  adjustment of the mA and/or kV according to patient size, and/or use of  iterative reconstruction technique.    MIP reconstructed images were created and reviewed.     COMPARISON:    None.     FINDINGS:      VASCULATURE:    Right common carotid artery:  Unremarkable as visualized.  No  occlusion or significant stenosis.  No dissection.    Right internal carotid artery:  Tortuosity right ICA. No significant  stenosis or occlusion.  Mild plaque proximal right ICA without  significant stenosis or occlusion.    Right external carotid artery:  Unremarkable as visualized.  No  occlusion.    Right vertebral artery:  Right vertebral artery dominant system.  No  occlusion or significant stenosis.  No dissection.       Left common carotid artery:  Unremarkable as visualized.  No occlusion  or significant stenosis.  No dissection.    Left internal carotid artery:  Mild calcified plaque proximal left ICA  without significant stenosis or occlusion.    Left external carotid artery:  Unremarkable as visualized.  No  occlusion.    Left vertebral artery:  Unremarkable as visualized.  No occlusion or  significant stenosis.  No dissection.    Aorta:  Four-vessel arch configuration incidentally noted.      NECK:    Bones/joints:  Degenerative changes cervical spine with multilevel  stenosis.    Soft tissues:   Unremarkable as visualized.    Lung apices:  Bilateral upper lobe airspace disease most consistent  with pneumonia with nodular type opacities also present.      CAROTID STENOSIS REFERENCE USING NASCET CRITERIA:    % ICA stenosis = (1 - narrowest ICA diameter/diameter of distal  cervical ICA) x 100.    Mild - <50% stenosis.    Moderate - 50-69% stenosis.    Severe - 70-94% stenosis.    Near occlusion - 95-99% stenosis.    Occluded - 100% stenosis.       Impression:         1. Minimal plaque both carotid systems. No significant stenosis or  occlusion.  2. Vertebral arteries appear within normal limits with right vertebral  artery dominance noted.  3. Bilateral upper lobe airspace disease most consistent with pneumonia  with nodular type opacities also present.  4. Other nonacute findings above.        This report was finalized on 4/27/2024 4:41 PM by Dr. Eric Stokes MD.       CT Head Without Contrast Stroke Protocol [[id number]] Collected: 04/27/24 1621     Updated: 04/27/24 1624    Narrative:      EXAM:    CT Head Without Intravenous Contrast     EXAM DATE:    4/27/2024 4:08 PM     CLINICAL HISTORY:    Neuro deficit, acute stroke suspected     TECHNIQUE:    Axial computed tomography images of the head/brain without intravenous  contrast.  Sagittal and coronal reformatted images were created and  reviewed.  This CT exam was performed using one or more of the following  dose reduction techniques:  automated exposure control, adjustment of  the mA and/or kV according to patient size, and/or use of iterative  reconstruction technique.     COMPARISON:    No relevant prior studies available.     FINDINGS:    Brain and extra-axial spaces:  Mild cerebral atrophy.  Moderate  chronic small vessel ischemic disease.  No hemorrhage.    Bones/joints:  Unremarkable as visualized.  No acute fracture.    Soft tissues:  Unremarkable as visualized.    Sinuses:  Mild chronic paranasal sinus disease.    Mastoid air cells:   Unremarkable as visualized.  No mastoid effusion.       Impression:      1.  No CT evidence of acute intracranial abnormality.  2.  Moderate chronic small vessel ischemic disease and mild age-related  cerebral atrophy noted.  3.  Mild chronic paranasal sinus disease.        This report was finalized on 4/27/2024 4:21 PM by Dr. Eric Stokes MD.             I have personally reviewed the above radiology results.   ---------------------------------------------------------------------------------------------------------------------      Pertinent Infectious Disease Results          Assessment & Plan      Assessment        ESBL bacteremia  Complicated UTI      Plan      Patient presented to [facility] Emergency Department on 4/27/2024 for confusion, incontinence, fever.  WBC 15.87.  CRP 15.00.  Urinalysis from 4/27/2024 positive culture currently in progress.  Blood cultures from 4/27/2024 2 out of 2 sets positive for ESBL E. coli.  Lactic acid 2.8 on admission.  Chest x-ray from 4/27/2024 small nodular opacities scattered throughout the lungs.  CT of the chest reports constellation of findings compatible with sarcoidosis, small pericardial effusion, CT of the abdomen pelvis, mild to moderate left hydronephrosis with double-J ureteral stent in place, defect in the left renal pelvis is likely due to a small clot from the recent intervention rather than neoplasm, mildly distended gallbladder with numerous calculi.    Based on culture preliminary results we will continue meropenem pharmacy to dose monotherapy for treatment of ESBL bacteremia secondary to urinary source.  Patient will likely require prolonged antibiotic therapy in the setting of stent placement.  We will continue to follow closely and adjust antibiotic therapy as needed.      ANTIMICROBIAL THERAPY    meropenem (MERREM) 1000 mg IVPB in 100 mL NS (VTB)       Again, thank you Dr. Carlos for allowing us to participate in the care of your patient and  please feel free to call for any questions you may have.        Code Status:     Code Status and Medical Interventions:   Ordered at: 04/27/24 2127     Code Status (Patient has no pulse and is not breathing):    CPR (Attempt to Resuscitate)     Medical Interventions (Patient has pulse or is breathing):    Full Support         KENDRA Cantu  04/28/24  13:36 EDT    Electronically signed by Teresita Alcaraz APRN at 04/28/24 1351       Discharge Summary    No notes of this type exist for this encounter.       Discharge Order (From admission, onward)       Start     Ordered    05/01/24 0932  Discharge patient  Once        Expected Discharge Date: 05/01/24   Discharge Disposition: Home-Health Care [facility] [place]   Physician of Record for Attribution - Please select from Treatment Team: ABE THURSTON [[id number]]   Review needed by CMO to determine Physician of Record: No      Question Answer Comment   Physician of Record for Attribution - Please select from Treatment Team ABE THURSTON    Review needed by CMO to determine Physician of Record No        05/01/24 0915

## 2024-05-01 NOTE — PLAN OF CARE
Goal Outcome Evaluation:           Progress: no change  Outcome Evaluation: pt resting in bed, pt has ambulated throughout room independently, family at bedside, will continue plan of care.

## 2024-05-01 NOTE — DISCHARGE PLACEMENT REQUEST
"Jamin Schafer (73 y.o. Male)       Date of Birth   1950    Social Security Number       Address   [address] [place] [address]    Home Phone   [phone]    MRN   [id number]       [facility]    Marital Status                               Admission Date   4/27/24    Admission Type   Emergency    Admitting Provider   Jamin Soto MD    Attending Provider   Erika Paredes DO    Department, Room/Bed   [address], 3349/1S       Discharge Date       Discharge Disposition   Home-Health Care [facility]    Discharge Destination                                 Attending Provider: Erika Paredes DO    Allergies: No Known Allergies    Isolation: Contact   Infection: ESBL (04/28/24), ESBL E coli (04/30/24)   Code Status: CPR    Ht: 170.2 cm (67\")   Wt: 70.7 kg (155 lb 14.4 oz)    Admission Cmt: None   Principal Problem: Pyelonephritis [N12]                   Active Insurance as of 4/27/2024       Primary Coverage       Payor Plan Insurance Group Employer/Plan Group    HUMANA MEDICARE REPLACEMENT HUMANA MED ADV PPO [id number]       Payor Plan Address Payor Plan Phone Number Payor Plan Fax Number Effective Dates    [address] [address] [phone]  1/1/2023 - None Entered    [facility] [address]         Subscriber Name Subscriber Birth Date Member ID       JAMIN SCHAFER 1950 [id number]                     Emergency Contacts        (Rel.) Home Phone Work Phone Mobile Phone    Latasha Schafer (Spouse) [phone] -- [phone]    Chan Schafer (Son) -- -- [phone]    Viky Carpenter (Daughter) -- -- [phone]    Shekhar Schafer (Son) [phone] -- [phone]          ertapenem 1,000 mg in sodium chloride 0.9 % 100 mL IVPB [[id number]] (Order [id number])  Order  Date: 5/1/2024 Department: [address] Ordering/Authorizing: Erika Paredes DO     Medication  ertapenem 1,000 mg in sodium chloride 0.9 % 100 mL IVPB " [[id number]]  Order History  Outpatient  Date/Time Action Taken User Additional Information   05/01/24 0940 Sign Erika Paredes DO Reorder from Order:[id number]   05/01/24 0940 Taking Flag Checked Erika Paredes DO [id number]     Outpatient Morphine Milligram Equivalents Per Day  Expand All  Collapse All  5/1/24 and after  40 MME/Day                      Calculation Information             ertapenem 1,000 mg in sodium chloride 0.9 % 100 mL IVPB [[id number]]     Order Details  Dose: 1,000 mg Route: Intravenous Frequency: Every 24 Hours   Dispense Quantity: -- Refills: --    Indications of Use: Bacteremia, Urinary Tract Infection         Sig: Infuse 1,000 mg into a venous catheter Daily for 10 doses. Indications: Bacteria in the Blood, Urinary Tract Infection         Start Date: 05/02/24 End Date: 05/12/24 after 10 doses   Written Date: 05/01/24 Expiration Date: 05/01/25       Components    Component Ordered Dose Dispense Quantity   ertapenem 1 g reconstituted solution 1,000 mg 1,000 mg   sodium chloride 0.9 % solution 100 mL 100 mL         Providers    Ordering Provider and Authorizing Provider:    Erika [facility]aredes DO   [address] [facility] [address]   Phone:  [phone]   Fax:  [phone]   NPI:  [id number]        Ordering User:  Erika Paredes DO             Orders with any of the following pharmaceutical classes: Anti-Infective Agents - Misc.    Name Dose Frequency Start Date End Date Medication Warnings Interventions? Order Mode    ertapenem (INVanz) 1,000 mg in sodium chloride 0.9 % 100 mL IVPB-VTB 1,000 mg Every 24 Hours 04/29/24 1800 05/11/24 2359   Inpatient    meropenem (MERREM) 1,000 mg in sodium chloride 0.9 % 100 mL IVPB-VTB 1,000 mg Every 12 Hours 04/28/24 1830 04/29/24 1035   Inpatient    meropenem (MERREM) 1,000 mg in sodium chloride 0.9 % 100 mL IVPB-VTB 1,000 mg Once 04/28/24 1230 04/28/24 1232   Inpatient    vancomycin (VANCOCIN) 1,000 mg in sodium chloride 0.9 % 250  "mL IVPB-VTB 1,000 mg Every 24 Hours 04/28/24 2000 04/28/24 1142   Inpatient    Pharmacy to dose vancomycin  Continuous PRN 04/27/24 2123 04/27/24 2137  Yes Inpatient      Warnings Override History    No Interaction Warnings Shown      Pharmacist Clinical Review History    This prescription has not been clinically reviewed.     Order Reconciliation Actions       Order Reconciliation Actions     E-Prescribing Status    Outpatient Medication Detail    ertapenem 1,000 mg in sodium chloride 0.9 % 100 mL IVPB        Sig: Infuse 1,000 mg into a venous catheter Daily for 10 doses. Indications: Bacteria in the Blood, Urinary Tract Infection        Class: No Print        Route: Intravenous          Event History       Event History     Tracking Reports    Cosign Tracking Order Transmittal Tracking       Alma Prescott RN   Registered Nurse  Consults      Signed  Date of Service:  04/30/24 1144  Creation Time:  04/30/24 1144  Consult Orders   Midline Consult [[id number]] ordered by Teresita Alcaraz APRN at 04/30/24 0916          Signed        Expand All Collapse All  Assessment:  Diagnosis: pyelonephritis     Allergies   No Known Allergies        Order Date/Time: 4/30/2024 0916  Indications: iv abx to 5/11/2024  LABS:        Lab Results   Component Value Date     INR 1.00 04/27/2024     PROTIME 13.7 04/27/2024            Lab Results   Component Value Date     PTT 35.2 (H) 04/27/2024            Lab Results   Component Value Date     WBC 13.79 (H) 04/30/2024     HGB 10.1 (L) 04/30/2024     HCT 31.7 (L) 04/30/2024     MCV 88.8 04/30/2024      04/30/2024            Lab Results   Component Value Date     BUN 18 04/30/2024            Lab Results   Component Value Date     CREATININE 1.24 04/30/2024      No results found for: \"EGFRIFNONA\", \"EGFRIFAFRI\"  Labs Reviewed: all labs reviewed     Contraindications for PICC/Midline:  No contraindications noted     Recommendations:  PowerGlide Pro Midline Catheter; 18 g; 10 " cm  Upper Right Basilic     Procedure Time Out:  Time out Time: 1115  Correct Patient Identity: Yes  Correct Surgical Side and Site Are Marked: Yes  Agreement on Procedure to be done: Yes  Antibiotic Given: N/A  RN: KENTON Prescott RN     PowerGlide Pro Midline Catheter placed with ultrasound guidance and verified by blood return. Minimal blood loss noted. Catheter flushes easily. Blood return noted. Patient nurse, Sophia RN, made aware that midline is in upper R arm and ready for use.        Alma Prescott RN                      History & Physical        Jamin Soto MD at 24 2212          Hospitalist History and Physical        Patient Identification  Name: Jamin Julio  Age/Sex: 73 y.o. male  :  1950        MRN: [id number]  Visit Number: [id number]  Admit Date: 2024   PCP: Christophe Jamil PA-C          Chief complaint confusion, fever    History of Present Illness:  Patient is a 73 y.o. male with history of asthma, black lung, arthritis, type II DM, HTN, HLD, GERD, JUANCARLOS and kidney stones who underwent cystoscopy with lithotripsy of obstructing left ureteral stones and left ureteral stent placement. He did well post-op initially and was even feeling well this morning. However, later today he became more confused, urinated on himself and was found to have a fever. He was brought to the ED and initial concerns were for stroke. CT head and CT angiograms head and neck showed no evidence of stroke or LVO. CT cerebral perfusion scan commented on artifact vs ischemia parameters in the left frontal lobe. Neurology reportedly weighed in on patient and felt that confusion was more due to underlying infectious process rather than stroke. Patient was febrile up to 102.9 F in the ED and tachycardic up to 116. He was initially hypertensive as well but this has improved without intervention. Labs showed mild troponin elevation with flat trend, normal BNP, BUN 25, Cr 1.57 (down from 1.77 3 days ago  but overall up from 1.20 on 1/131/24). Glucose elevated at 299. LFTs normal. CRP 5.14, lactate 2.8, procal 0.58, WBC 15.54. hemoglobin stable at 11.7. UA showed positive nitrite, mod leuk esterase, large blood, TNTC RBC and WBC, and 1+ bacteria. Urine and blood cultures are pending. CT chest showed constellation of findings compatible with sarcoid, small pericardial effusion, mild to moderate left hydronephrosis (improved from 4/26/24 scan that showed marked left sided hydro and ureteral dilatation secondary to multiple obstructing distal left ureteral stones) with double-J ureteral stent in place, defect in left renal pelvis likely due to small clot from recent intervention, and mildly distended gallbladder with numerous stones but no other signs of acute cholecystitis (this was mentioned on Ct from 4/26/24 as well). Patient has been admitted for further management of severe sepsis and acute encephalopathy secondary to pyelonephritis.     Review of Systems  Review of Systems   Unable to perform ROS: Mental status change       History  Past Medical History:   Diagnosis Date    Arthritis     Asthma     Black lung disease     Diabetes mellitus     Elevated cholesterol     GERD (gastroesophageal reflux disease)     Hypertension     Kidney stone     Sleep apnea     no c-pap     Past Surgical History:   Procedure Laterality Date    CATARACT EXTRACTION WITH INTRAOCULAR LENS IMPLANT Bilateral     COLONOSCOPY      CYST REMOVAL Right     hip     Family History   Problem Relation Age of Onset    Prostate cancer Father      Social History     Tobacco Use    Smoking status: Never     Passive exposure: Never    Smokeless tobacco: Current     Types: Snuff   Vaping Use    Vaping status: Never Used   Substance Use Topics    Alcohol use: Never    Drug use: Never     Medications Prior to Admission   Medication Sig Dispense Refill Last Dose    albuterol sulfate  (90 Base) MCG/ACT inhaler Inhale 2 puffs Every 4 (Four) Hours As  Needed for Shortness of Air.       Aspirin Low Dose 81 MG EC tablet Take 1 tablet by mouth Daily.       budesonide-formoterol (SYMBICORT) 160-4.5 MCG/ACT inhaler Inhale 2 puffs 2 (Two) Times a Day. Rinse mouth after each use       carvedilol (COREG) 3.125 MG tablet Take 1 tablet by mouth 2 (Two) Times a Day With Meals.       glimepiride (AMARYL) 1 MG tablet 1 tablet.       HYDROcodone-acetaminophen (NORCO)  MG per tablet Take 1 tablet by mouth Every 6 (Six) Hours As Needed for Moderate Pain (Pain). 20 tablet 0     Januvia 100 MG tablet 1 tablet.       lactulose (CHRONULAC) 10 GM/15ML solution Take 30 mL by mouth 2 (Two) Times a Day As Needed (for constipation). 237 mL 1     latanoprost (XALATAN) 0.005 % ophthalmic solution INSTILL 1 DROP IN BOTH EYES EVERY DAY IN THE EVENING       levocetirizine (XYZAL) 5 MG tablet Take 1 tablet by mouth every night at bedtime.       linaclotide (LINZESS) 145 MCG capsule capsule Take 1 capsule by mouth Every Morning Before Breakfast. 30 capsule 2     lisinopril (PRINIVIL,ZESTRIL) 20 MG tablet Take 1 tablet by mouth every night at bedtime.       meloxicam (MOBIC) 15 MG tablet        metFORMIN (GLUCOPHAGE) 500 MG tablet take 1 tablet by mouth twice daily with the morning and evening meal       metoprolol tartrate (LOPRESSOR) 25 MG tablet Take  by mouth.       Mirabegron ER (MYRBETRIQ) 25 MG tablet sustained-release 24 hour 24 hr tablet Take 1 tablet by mouth Daily. 30 tablet 2     montelukast (SINGULAIR) 10 MG tablet        omeprazole (priLOSEC) 20 MG capsule TAKE 1 CAPSULE BY MOUTH EVERY DAY 30 MINUTES TO 1 HOUR BEFORE A MEAL       rosuvastatin (CRESTOR) 10 MG tablet Take 1 tablet by mouth Daily.       tamsulosin (FLOMAX) 0.4 MG capsule 24 hr capsule Take 1 capsule by mouth Daily. 90 capsule 3      Allergies:  Patient has no known allergies.    Objective    Vital Signs  Temp:  [102.9 °F (39.4 °C)] 102.9 °F (39.4 °C)  Heart Rate:  [106-116] 116  Resp:  [14] 14  BP:  (140-199)/(62-82) 164/77  Body mass index is 25.51 kg/m².    Physical Exam:  Physical Exam  Constitutional:       Comments: Awake but confused, acutely ill in appearance   HENT:      Head: Normocephalic and atraumatic.      Right Ear: External ear normal.      Left Ear: External ear normal.      Nose: Nose normal.      Mouth/Throat:      Mouth: Mucous membranes are moist.      Pharynx: Oropharynx is clear.   Eyes:      Extraocular Movements: Extraocular movements intact.      Conjunctiva/sclera: Conjunctivae normal.      Pupils: Pupils are equal, round, and reactive to light.   Cardiovascular:      Rate and Rhythm: Regular rhythm. Tachycardia present.      Pulses: Normal pulses.      Heart sounds: Normal heart sounds. No murmur heard.  Pulmonary:      Effort: Pulmonary effort is normal. No respiratory distress.      Breath sounds: Normal breath sounds. No wheezing or rales.   Abdominal:      General: Abdomen is flat. Bowel sounds are normal. There is no distension.      Palpations: Abdomen is soft.      Tenderness: There is no abdominal tenderness. There is left CVA tenderness.   Musculoskeletal:         General: Normal range of motion.      Cervical back: Neck supple. No tenderness.      Right lower leg: No edema.      Left lower leg: No edema.   Lymphadenopathy:      Cervical: No cervical adenopathy.   Skin:     General: Skin is warm and dry.      Capillary Refill: Capillary refill takes less than 2 seconds.      Coloration: Skin is not jaundiced.      Findings: No bruising or lesion.   Neurological:      General: No focal deficit present.      Comments: Awake but confused, oriented to self and place but not year or context   Psychiatric:      Comments: Unable to assess           Results Review:       Lab Results:  Results from last 7 days   Lab Units 04/27/24  1617 04/24/24  1403   WBC 10*3/mm3 15.54* 8.92   HEMOGLOBIN g/dL 11.7* 11.0*   PLATELETS 10*3/mm3 146 168     Results from last 7 days   Lab Units  04/27/24  1703   CRP mg/dL 5.14*     Results from last 7 days   Lab Units 04/27/24  1617 04/24/24  1403   SODIUM mmol/L 136 141   POTASSIUM mmol/L 4.2 4.6   CHLORIDE mmol/L 100 104   CO2 mmol/L 22.1 28.1   BUN mg/dL 25* 24*   CREATININE mg/dL 1.57* 1.77*   CALCIUM mg/dL 9.3 9.7   GLUCOSE mg/dL 299* 143*     Results from last 7 days   Lab Units 04/27/24  1703   MAGNESIUM mg/dL 1.7     Hemoglobin A1C   Date Value Ref Range Status   04/27/2024 7.10 (H) 4.80 - 5.60 % Final     Results from last 7 days   Lab Units 04/27/24  1617   BILIRUBIN mg/dL 0.3   ALK PHOS U/L 85   AST (SGOT) U/L 22   ALT (SGPT) U/L 23     Results from last 7 days   Lab Units 04/27/24  1955 04/27/24  1703 04/27/24  1617   HSTROP T ng/L 24* 26* 25*         Results from last 7 days   Lab Units 04/27/24  1617   INR  1.00           I have reviewed the patient's laboratory results.    Imaging:  Imaging Results (Last 72 Hours)       Procedure Component Value Units Date/Time    CT Chest Without Contrast Diagnostic [[id number]] Collected: 04/27/24 1758     Updated: 04/27/24 1813    Narrative:      CLINICAL HISTORY: COPD suspected, abdominal pain.     COMPARISON: CT of the abdomen and pelvis on 4/26/2024.     TECHNIQUE: Noncontrast CT of the chest, abdomen and pelvis was obtained.   Multiplanar reformats were generated Limited exposure control,  adjustment of the mA and/or KV according to patient size or use of  iterative reconstruction technique was utilized.     FINDINGS:     CT CHEST:     Pulmonary arteries: normal.      Heart and pericardium: Normal heart size.  Pericardial effusion.   Extensive coronary calcifications..     Aorta: normal.     Esophagus: normal.     Lungs and airways: Upper lobe predominant nodules and areas of  consolidation along the bronchovascular bundles with mild volume loss  and scarring.  Subsegmental dependent bibasilar atelectasis is noted.   Central airways are patent.     Pleura: normal.     Lymph nodes: Mediastinal and hilar  lymph nodes are calcified.     Musculoskeletal and chest wall: no acute abnormality.     Other: n/a        CT ABDOMEN AND PELVIS:     Hepatobiliary: Gallbladder is moderately distended and contains numerous  calculi.  The liver is normal.     Pancreas: normal.     Spleen: Calcified granulomata are scattered to the otherwise normal  spleen.     Adrenals: normal.      Kidneys, ureters, bladder: Since the prior study, the patient has  undergone placement of a double-J ureteral stent in the left renal  collecting system, with the proximal loop in the upper pole calyx and  the distal loop in the urinary bladder.  The stone seen on the prior  study are not as well-seen on this exam and may have been removed or not  visible due to the contrast media in the renal collecting system.   Filling defects in the renal pelvis may be due to small clot from a  recent intervention.  Correlate with the intra-procedural retrograde  urogram.  The right kidney and ureter are normal.  Urinary bladder is  mildly trabeculated.     Reproductive: Ag gland is mildly enlarged at 5.6 cm in diameter and  is partially calcified.     GI tract/Bowel: Sigmoid diverticulosis without findings for acute  diverticulitis.  No bowel obstruction.  No pneumatosis or portal venous  gas..     Appendix: normal.     Peritoneum: normal, no free air or fluid.     Vascular: Aortoiliac atherosclerosis.     Lymph nodes: normal.     Musculoskeletal and abdominal wall: Multilevel degenerative changes in  the lumbar spine       Impression:         CT CHEST:  CONSTELLATION OF FINDINGS COMPATIBLE WITH SARCOIDOSIS.     SMALL PERICARDIAL EFFUSION.     CT ABDOMEN AND PELVIS:  MILD TO MODERATE LEFT HYDRONEPHROSIS WITH DOUBLE-J URETERAL STENT IN  PLACE.     DEFECT IN THE LEFT RENAL PELVIS IS LIKELY DUE TO A SMALL CLOT FROM THE  RECENT INTERVENTION RATHER THAN A NEOPLASM.  CORRELATE WITH THE PREVIOUS  INTRAOPERATIVE UROGRAM.     MILDLY DISTENDED GALLBLADDER WITH NUMEROUS  CALCULI.  NO ADDITIONAL  FINDINGS TO SUGGEST ACUTE CHOLECYSTITIS.     This report was finalized on 4/27/2024 6:10 PM by Trish Chang MD.       CT Abdomen Pelvis Without Contrast [[id number]] Collected: 04/27/24 1758     Updated: 04/27/24 1813    Narrative:      CLINICAL HISTORY: COPD suspected, abdominal pain.     COMPARISON: CT of the abdomen and pelvis on 4/26/2024.     TECHNIQUE: Noncontrast CT of the chest, abdomen and pelvis was obtained.   Multiplanar reformats were generated Limited exposure control,  adjustment of the mA and/or KV according to patient size or use of  iterative reconstruction technique was utilized.     FINDINGS:     CT CHEST:     Pulmonary arteries: normal.      Heart and pericardium: Normal heart size.  Pericardial effusion.   Extensive coronary calcifications..     Aorta: normal.     Esophagus: normal.     Lungs and airways: Upper lobe predominant nodules and areas of  consolidation along the bronchovascular bundles with mild volume loss  and scarring.  Subsegmental dependent bibasilar atelectasis is noted.   Central airways are patent.     Pleura: normal.     Lymph nodes: Mediastinal and hilar lymph nodes are calcified.     Musculoskeletal and chest wall: no acute abnormality.     Other: n/a        CT ABDOMEN AND PELVIS:     Hepatobiliary: Gallbladder is moderately distended and contains numerous  calculi.  The liver is normal.     Pancreas: normal.     Spleen: Calcified granulomata are scattered to the otherwise normal  spleen.     Adrenals: normal.      Kidneys, ureters, bladder: Since the prior study, the patient has  undergone placement of a double-J ureteral stent in the left renal  collecting system, with the proximal loop in the upper pole calyx and  the distal loop in the urinary bladder.  The stone seen on the prior  study are not as well-seen on this exam and may have been removed or not  visible due to the contrast media in the renal collecting system.   Filling defects in the  renal pelvis may be due to small clot from a  recent intervention.  Correlate with the intra-procedural retrograde  urogram.  The right kidney and ureter are normal.  Urinary bladder is  mildly trabeculated.     Reproductive: Ag gland is mildly enlarged at 5.6 cm in diameter and  is partially calcified.     GI tract/Bowel: Sigmoid diverticulosis without findings for acute  diverticulitis.  No bowel obstruction.  No pneumatosis or portal venous  gas..     Appendix: normal.     Peritoneum: normal, no free air or fluid.     Vascular: Aortoiliac atherosclerosis.     Lymph nodes: normal.     Musculoskeletal and abdominal wall: Multilevel degenerative changes in  the lumbar spine       Impression:         CT CHEST:  CONSTELLATION OF FINDINGS COMPATIBLE WITH SARCOIDOSIS.     SMALL PERICARDIAL EFFUSION.     CT ABDOMEN AND PELVIS:  MILD TO MODERATE LEFT HYDRONEPHROSIS WITH DOUBLE-J URETERAL STENT IN  PLACE.     DEFECT IN THE LEFT RENAL PELVIS IS LIKELY DUE TO A SMALL CLOT FROM THE  RECENT INTERVENTION RATHER THAN A NEOPLASM.  CORRELATE WITH THE PREVIOUS  INTRAOPERATIVE UROGRAM.     MILDLY DISTENDED GALLBLADDER WITH NUMEROUS CALCULI.  NO ADDITIONAL  FINDINGS TO SUGGEST ACUTE CHOLECYSTITIS.     This report was finalized on 4/27/2024 6:10 PM by Trish Chang MD.       XR Chest 1 View [[id number]] Collected: 04/27/24 1756     Updated: 04/27/24 1759    Narrative:      CLINICAL HISTORY: Altered mental status.     COMPARISON: CT chest 04/27/2024.     TECHNIQUE: Single portable upright AP view of the chest was obtained.     FINDINGS: Small nodular opacities are scattered the lungs.  No pleural  effusion or pneumothorax is seen.  Heart size is mildly enlarged.  No  acute osseous or upper abdominal abnormality is seen.       Impression:         SMALL NODULAR OPACITIES SCATTERED THROUGH THE LUNGS. CT HAS ALREADY BEEN  PERFORMED.     CARDIOMEGALY.           This report was finalized on 4/27/2024 5:57 PM by Trish Chang MD.       CT  CEREBRAL PERFUSION WITH & WITHOUT CONTRAST [[id number]] Collected: 04/27/24 1646     Updated: 04/27/24 1648    Narrative:      EXAMINATION: CT CEREBRAL PERFUSION W WO CONTRAST-      CLINICAL INDICATION:Neuro deficit, acute, stroke suspected     COMPARISON: CT, CTA same day     TECHNIQUE: Axial computed tomography images of the brain without and  with intravenous contrast using cerebral perfusion protocol.   Post-processing parametric maps were created using [email or web address] software and  reviewed.  Sagittal and coronal reformatted images were created and  reviewed.  This CT exam was performed using one or more of the following  dose reduction techniques:  automated exposure control, adjustment of  the mA and/or kV according to patient size, and/or use of iterative  reconstruction technique.     FINDINGS:     Arterial input function is optimal.      PERFUSION PARAMETERS:  Ischemic tissue volume (Tmax > 6 sec.): 4 mL.  Infarct core volume (CBF < 30%): 0  mL.  Mismatch (penumbra) volume: 0  mL.  Mismatch ratio: Not applicable.  Location/territory: Left frontal lobe.     COLLATERAL CIRCULATION PARAMETERS:  Volume poor collateral perfusion (Tmax > 10 sec.): 0  mL.  Hypoperfusion index (Tmax >10 sec./Tmax > 6 sec.): 0   CBV Index (rCBV in Tmax > 6 sec. region): 0.6     OTHER:  No additional remarkable findings.       Impression:      Artifact versus ischemia parameters in the left frontal lobe. No core  infarct identified.        This report was finalized on 4/27/2024 4:46 PM by Dr. Eric Stokes MD.       CT Angiogram Head w AI Analysis of LVO [[id number]] Collected: 04/27/24 1641     Updated: 04/27/24 1645    Narrative:      EXAM:    CT Angiography Head With Intravenous Contrast     EXAM DATE:    4/27/2024 4:08 PM     CLINICAL HISTORY:    Stroke, follow up     TECHNIQUE:    Axial computed tomographic angiography images of the head with  intravenous contrast. LVO analysis was performed with [email or web address] software.   This CT exam was  performed using one or more of the following dose  reduction techniques:  automated exposure control, adjustment of the mA  and/or kV according to patient size, and/or use of iterative  reconstruction technique.    MIP reconstructed images were created and reviewed.     COMPARISON:    No relevant prior studies available.     FINDINGS:    Right internal carotid artery:  Moderate calcifications intracranial  right ICA segments without significant stenosis or occlusion.  No  aneurysm.    Right anterior cerebral artery:  Unremarkable as visualized.  No  occlusion or significant stenosis.  No aneurysm.    Right middle cerebral artery:  Unremarkable as visualized.  No  occlusion or significant stenosis.  No aneurysm.    Right posterior cerebral artery:  Unremarkable as visualized.  No  occlusion or significant stenosis.  No aneurysm.    Right vertebral artery:  Right vertebral artery dominance incidentally  noted.       Left internal carotid artery:  Moderate calcifications of the left  intracranial ICA segments without significant stenosis or occlusion.  No  aneurysm.    Left anterior cerebral artery:  Unremarkable as visualized.  No  occlusion or significant stenosis.  No aneurysm.    Left middle cerebral artery:  Unremarkable as visualized.  No  occlusion or significant stenosis.  No aneurysm.    Left posterior cerebral artery:  Unremarkable as visualized.  No  occlusion or significant stenosis.  No aneurysm.    Left vertebral artery:  Unremarkable as visualized.       Basilar artery:  Unremarkable as visualized.  No occlusion or  significant stenosis.  No aneurysm.       Impression:        Essentially unremarkable CTA of the brain with no large vessel  occlusion.        This report was finalized on 4/27/2024 4:42 PM by Dr. Eric Stokes MD.       CT Angiogram Neck [[id number]] Collected: 04/27/24 1639     Updated: 04/27/24 1643    Narrative:      EXAM:    CT Angiography Neck With Intravenous Contrast     EXAM DATE:     4/27/2024 4:08 PM     CLINICAL HISTORY:    Stroke, follow up     TECHNIQUE:    Axial computed tomographic angiography images of the neck with  intravenous contrast.  This CT exam was performed using one or more of  the following dose reduction techniques:  automated exposure control,  adjustment of the mA and/or kV according to patient size, and/or use of  iterative reconstruction technique.    MIP reconstructed images were created and reviewed.     COMPARISON:    None.     FINDINGS:      VASCULATURE:    Right common carotid artery:  Unremarkable as visualized.  No  occlusion or significant stenosis.  No dissection.    Right internal carotid artery:  Tortuosity right ICA. No significant  stenosis or occlusion.  Mild plaque proximal right ICA without  significant stenosis or occlusion.    Right external carotid artery:  Unremarkable as visualized.  No  occlusion.    Right vertebral artery:  Right vertebral artery dominant system.  No  occlusion or significant stenosis.  No dissection.       Left common carotid artery:  Unremarkable as visualized.  No occlusion  or significant stenosis.  No dissection.    Left internal carotid artery:  Mild calcified plaque proximal left ICA  without significant stenosis or occlusion.    Left external carotid artery:  Unremarkable as visualized.  No  occlusion.    Left vertebral artery:  Unremarkable as visualized.  No occlusion or  significant stenosis.  No dissection.    Aorta:  Four-vessel arch configuration incidentally noted.      NECK:    Bones/joints:  Degenerative changes cervical spine with multilevel  stenosis.    Soft tissues:  Unremarkable as visualized.    Lung apices:  Bilateral upper lobe airspace disease most consistent  with pneumonia with nodular type opacities also present.      CAROTID STENOSIS REFERENCE USING NASCET CRITERIA:    % ICA stenosis = (1 - narrowest ICA diameter/diameter of distal  cervical ICA) x 100.    Mild - <50% stenosis.    Moderate - 50-69%  stenosis.    Severe - 70-94% stenosis.    Near occlusion - 95-99% stenosis.    Occluded - 100% stenosis.       Impression:         1. Minimal plaque both carotid systems. No significant stenosis or  occlusion.  2. Vertebral arteries appear within normal limits with right vertebral  artery dominance noted.  3. Bilateral upper lobe airspace disease most consistent with pneumonia  with nodular type opacities also present.  4. Other nonacute findings above.        This report was finalized on 4/27/2024 4:41 PM by Dr. Eric Stokes MD.       CT Head Without Contrast Stroke Protocol [[id number]] Collected: 04/27/24 1621     Updated: 04/27/24 1624    Narrative:      EXAM:    CT Head Without Intravenous Contrast     EXAM DATE:    4/27/2024 4:08 PM     CLINICAL HISTORY:    Neuro deficit, acute stroke suspected     TECHNIQUE:    Axial computed tomography images of the head/brain without intravenous  contrast.  Sagittal and coronal reformatted images were created and  reviewed.  This CT exam was performed using one or more of the following  dose reduction techniques:  automated exposure control, adjustment of  the mA and/or kV according to patient size, and/or use of iterative  reconstruction technique.     COMPARISON:    No relevant prior studies available.     FINDINGS:    Brain and extra-axial spaces:  Mild cerebral atrophy.  Moderate  chronic small vessel ischemic disease.  No hemorrhage.    Bones/joints:  Unremarkable as visualized.  No acute fracture.    Soft tissues:  Unremarkable as visualized.    Sinuses:  Mild chronic paranasal sinus disease.    Mastoid air cells:  Unremarkable as visualized.  No mastoid effusion.       Impression:      1.  No CT evidence of acute intracranial abnormality.  2.  Moderate chronic small vessel ischemic disease and mild age-related  cerebral atrophy noted.  3.  Mild chronic paranasal sinus disease.        This report was finalized on 4/27/2024 4:21 PM by Dr. Eric Stokes MD.                I have personally reviewed the patient's radiologic imaging.    EKG:   Sinus tachycardia with premature atrial complexes, , QTc 463  Otherwise normal ECG  When compared with ECG of 24-APR-2024 13:05,  premature atrial complexes are now present  Vent. rate has increased BY  42 BPM  Criteria for Septal infarct are no longer present  Nonspecific T wave abnormality, improved in Lateral leads    I have personally reviewed the patient's EKG. No overt ST changes appreciated.     Assessment & Plan    - Severe sepsis, present on admission with fever, tachycardia, leukocytosis, lactic acidosis, acute encephalopathy and RUI, secondary to pyelonephritis. Infection could have already been developing secondary to obstructing ureteral stones that were removed yesterday. Also could have been introduced via instrumentation from cystoscopy/lithotripsy/ureteral stent placement. Therefore will treat broadly with IV vancomycin and zosyn while awaiting urine and blood culture results. Continue to trend fever curve and inflammatory markers. Continue IV fluid hydration.   - RUI, secondary to obstructing left ureteral stones: creatinine actually slightly better since lithotripsy and ureteral stent placement. Hydronephrosis less pronounced on CT imaging as well. Continue IV fluid hydration and continue to monitor creatinine closely.  - Small pericardial effusion: BNP normal. Evaluate further with ECHO in the morning.   - Incidentally seen gallbladder distention with cholelithiasis but no other signs of acute cholecystitis: LFTs normal. No RUQ tenderness on exam. No further workup planned at this time.   - Asthma/black lung: no respiratory complaints at this time. O2 sat adequate on RA. Lungs fairly clear on exam. CT imaging comments on changes consistent with sarcoid, though black lung could also be contributing to said findings. Continue to monitor respiratory status closely.   - Type II DM: monitor accuchecks, cover with SSI.      DVT Prophylaxis: SCDs    Estimated Length of Stay >2 midnights    I discussed the patient's findings, assessment and plan with the patient, his family at bedside, and ED provider Dr Mccallum.    Patient is high risk due to severe sepsis, left pyelonephritis, acute encephalopathy    Jamin Soto MD  04/27/24  22:12 EDT      Electronically signed by Jamin Soto MD at 04/27/24 2233       Lines, Drains & Airways       Active LDAs       Name Placement date Placement time Site Days    Midline Catheter - Single Lumen 04/30/24 Right Basilic 04/30/24  1135  -- less than 1    Peripheral IV 04/28/24 2140 Left;Posterior Forearm 04/28/24 2140  Forearm  2    Ureteral Drain/Stent Left ureter 5 Fr. 04/26/24  1132  Left ureter  4                  Current Facility-Administered Medications   Medication Dose Route Frequency Provider Last Rate Last Admin    acetaminophen (TYLENOL) tablet 650 mg  650 mg Oral Q6H PRN Amparo Arroyo PA-C        Or    acetaminophen (TYLENOL) suppository 650 mg  650 mg Rectal Q4H PRN Amparo Arroyo PA-C   650 mg at 04/28/24 0436    acetaminophen (TYLENOL) tablet 650 mg  650 mg Oral Q6H PRN Amparo Arroyo PA-C   650 mg at 04/29/24 0255    albuterol (PROVENTIL) nebulizer solution 0.083% 2.5 mg/3mL  2.5 mg Nebulization Q4H PRN Justin Aguilar DO        aspirin EC tablet 81 mg  81 mg Oral Daily Justin Aguilar DO   81 mg at 05/01/24 0840    sennosides-docusate (PERICOLACE) 8.6-50 MG per tablet 2 tablet  2 tablet Oral BID PRN Jamin Soto MD        And    polyethylene glycol (MIRALAX) packet 17 g  17 g Oral Daily PRN Jamin Soto MD        And    bisacodyl (DULCOLAX) EC tablet 5 mg  5 mg Oral Daily PRN Jamin Soto MD        And    bisacodyl (DULCOLAX) suppository 10 mg  10 mg Rectal Daily PRN Jamin Soto MD        budesonide-formoterol (SYMBICORT) 160-4.5 MCG/ACT inhaler 2 puff  2 puff Inhalation BID Justin Aguilar, DO   2 puff at 04/30/24  1917    carvedilol (COREG) tablet 3.125 mg  3.125 mg Oral BID With Meals Justin Aguilar DO   3.125 mg at 05/01/24 0840    cetirizine (zyrTEC) tablet 10 mg  10 mg Oral Daily Justin Aguilar DO   10 mg at 05/01/24 0840    cholecalciferol (VITAMIN D3) tablet 1,000 Units  1,000 Units Oral Daily Justin Aguilar DO   1,000 Units at 05/01/24 0841    dextrose (D50W) (25 g/50 mL) IV injection 25 g  25 g Intravenous Q15 Min PRN Jamin Soto MD        dextrose (GLUTOSE) oral gel 15 g  15 g Oral Q15 Min PRN Jamin Soto MD        Enoxaparin Sodium (LOVENOX) syringe 40 mg  40 mg Subcutaneous Nightly Justin Aguilar DO   40 mg at 04/30/24 2022    ertapenem (INVanz) 1,000 mg in sodium chloride 0.9 % 100 mL IVPB-VTB  1,000 mg Intravenous Q24H Teresita Alcaraz APRN 200 mL/hr at 05/01/24 0324 1,000 mg at 05/01/24 0324    finasteride (PROSCAR) tablet 5 mg  5 mg Oral Daily Justin Aguilar DO   5 mg at 05/01/24 0841    glucagon HCl (Diagnostic) injection 1 mg  1 mg Intramuscular Q15 Min PRN Jamin Soto MD        insulin glargine (LANTUS, SEMGLEE) injection 5 Units  5 Units Subcutaneous Nightly Justin Aguilar DO   5 Units at 04/30/24 2022    Insulin Lispro (humaLOG) injection 2-9 Units  2-9 Units Subcutaneous 4x Daily AC & at Bedtime Jamin Soto MD   6 Units at 04/30/24 2022    latanoprost (XALATAN) 0.005 % ophthalmic solution 1 drop  1 drop Both Eyes Daily Justin Aguilar DO   1 drop at 05/01/24 0845    lisinopril (PRINIVIL,ZESTRIL) tablet 20 mg  20 mg Oral Q24H Justin Aguilar DO   20 mg at 05/01/24 0840    lubiprostone (AMITIZA) capsule 8 mcg  8 mcg Oral BID Justin Aguilar DO   8 mcg at 05/01/24 0840    montelukast (SINGULAIR) tablet 10 mg  10 mg Oral Nightly Justin Aguilar DO   10 mg at 04/30/24 2021    mupirocin (BACTROBAN) 2 % nasal ointment 1 Application  1 application  Each Nare BID Justin Aguilar DO   1 Application at 05/01/24 0845    oxybutynin XL (DITROPAN-XL) 24 hr tablet 5 mg  5  mg Oral Daily Justin Aguilar DO   5 mg at 05/01/24 0841    pantoprazole (PROTONIX) EC tablet 40 mg  40 mg Oral Q AM Justin Aguilar DO   40 mg at 05/01/24 0545    rosuvastatin (CRESTOR) tablet 10 mg  10 mg Oral Daily Justin Aguilar DO   10 mg at 05/01/24 0841    sodium chloride 0.9 % flush 10 mL  10 mL Intravenous PRN Jamin Soto MD        sodium chloride 0.9 % flush 10 mL  10 mL Intravenous PRN Jamin Soto MD        sodium chloride 0.9 % flush 10 mL  10 mL Intravenous Q12H Jamin Soto MD   10 mL at 05/01/24 0845    sodium chloride 0.9 % flush 10 mL  10 mL Intravenous PRN Jamin Soto MD        sodium chloride 0.9 % flush 10 mL  10 mL Intravenous Q12H Justin Aguilar DO   10 mL at 05/01/24 0845    sodium chloride 0.9 % flush 10 mL  10 mL Intravenous PRN Justin Aguilar DO        sodium chloride 0.9 % infusion 40 mL  40 mL Intravenous PRN Jamin Soto MD        sodium chloride 0.9 % infusion 40 mL  40 mL Intravenous PRN Justin Aguilar DO        tamsulosin (FLOMAX) 24 hr capsule 0.4 mg  0.4 mg Oral Daily Justin Aguilar DO   0.4 mg at 05/01/24 0841     Lab Results (most recent)       Procedure Component Value Units Date/Time    POC Glucose Once [[id number]]  (Abnormal) Collected: 05/01/24 0631    Specimen: Blood Updated: 05/01/24 0647     Glucose 141 mg/dL     CBC (No Diff) [[id number]]  (Abnormal) Collected: 05/01/24 0227    Specimen: Blood Updated: 05/01/24 0237     WBC 7.64 10*3/mm3      RBC 3.51 10*6/mm3      Hemoglobin 9.9 g/dL      Hematocrit 32.0 %      MCV 91.2 fL      MCH 28.2 pg      MCHC 30.9 g/dL      RDW 13.8 %      RDW-SD 46.3 fl      MPV 11.2 fL      Platelets 151 10*3/mm3     POC Glucose Once [[id number]]  (Abnormal) Collected: 04/30/24 1951    Specimen: Blood Updated: 04/30/24 1957     Glucose 261 mg/dL     Blood Culture - Blood, Arm, Right [[id number]]  (Normal) Collected: 04/28/24 1547    Specimen: Blood from Arm, Right Updated: 04/30/24 5583      Blood Culture No growth at 2 days    Blood Culture - Blood, Arm, Left [[id number]]  (Normal) Collected: 04/28/24 1546    Specimen: Blood from Arm, Left Updated: 04/30/24 1600     Blood Culture No growth at 2 days    Urine Culture - Urine, Urine, Clean Catch [[id number]]  (Abnormal)  (Susceptibility) Collected: 04/27/24 2027    Specimen: Urine, Clean Catch Updated: 04/30/24 1049     Urine Culture 25,000 CFU/mL Escherichia coli ESBL     Comment:   Consider infectious disease consult.  Susceptibility results may not correlate to clinical outcomes.       Narrative:      Colonization of the urinary tract without infection is common. Treatment is discouraged unless the patient is symptomatic, pregnant, or undergoing an invasive urologic procedure.  Recent outcomes data supports the use of pip/tazo in the treatment of susceptible ESBL infections for uncomplicated UTI. Consider use of pip/tazo as a carbapenem-sparing regimen in applicable patients.    Susceptibility        Escherichia coli ESBL      DEBBIE      Amikacin Susceptible      Ertapenem Susceptible      Gentamicin Resistant      Levofloxacin Intermediate      Meropenem Susceptible      Nitrofurantoin Susceptible      Piperacillin + Tazobactam Susceptible      Tobramycin Resistant      Trimethoprim + Sulfamethoxazole Resistant                           Basic Metabolic Panel [[id number]]  (Abnormal) Collected: 04/30/24 0024    Specimen: Blood Updated: 04/30/24 0108     Glucose 157 mg/dL      BUN 18 mg/dL      Creatinine 1.24 mg/dL      Sodium 140 mmol/L      Potassium 3.6 mmol/L      Chloride 105 mmol/L      CO2 23.4 mmol/L      Calcium 8.7 mg/dL      BUN/Creatinine Ratio 14.5     Anion Gap 11.6 mmol/L      eGFR 61.0 mL/min/1.73     Narrative:      GFR Normal >60  Chronic Kidney Disease <60  Kidney Failure <15    The GFR formula is only valid for adults with stable renal function between ages 18 and 70.    CBC (No Diff) [[id number]]  (Abnormal) Collected: 04/30/24 0024     Specimen: Blood Updated: 04/30/24 0105     WBC 13.79 10*3/mm3      RBC 3.57 10*6/mm3      Hemoglobin 10.1 g/dL      Hematocrit 31.7 %      MCV 88.8 fL      MCH 28.3 pg      MCHC 31.9 g/dL      RDW 13.8 %      RDW-SD 45.1 fl      MPV 11.6 fL      Platelets 141 10*3/mm3     POC Creatinine [[id number]]  (Abnormal) Collected: 04/27/24 1618    Specimen: Blood Updated: 04/29/24 1630     Creatinine 1.60 mg/dL      Comment: Serial Number: [id number]:  [id number]       Basic Metabolic Panel [[id number]]  (Abnormal) Collected: 04/29/24 0258    Specimen: Blood Updated: 04/29/24 0405     Glucose 153 mg/dL      BUN 17 mg/dL      Creatinine 1.27 mg/dL      Sodium 140 mmol/L      Potassium 3.7 mmol/L      Chloride 106 mmol/L      CO2 22.2 mmol/L      Calcium 8.5 mg/dL      BUN/Creatinine Ratio 13.4     Anion Gap 11.8 mmol/L      eGFR 59.3 mL/min/1.73     Narrative:      GFR Normal >60  Chronic Kidney Disease <60  Kidney Failure <15    The GFR formula is only valid for adults with stable renal function between ages 18 and 70.    Blood Culture ID, PCR - Blood, Arm, Right [[id number]]  (Abnormal) Collected: 04/27/24 1703    Specimen: Blood from Arm, Right Updated: 04/28/24 1140     BCID, PCR Escherichia coli. Identification by BCID2 PCR.     BCID, PCR 2 CTX-M (ESBL) detected. Identification by BCID2 PCR     BOTTLE TYPE Aerobic Bottle    Comprehensive Metabolic Panel [[id number]]  (Abnormal) Collected: 04/28/24 0203    Specimen: Blood Updated: 04/28/24 0242     Glucose 127 mg/dL      BUN 19 mg/dL      Creatinine 1.62 mg/dL      Sodium 143 mmol/L      Potassium 4.1 mmol/L      Chloride 105 mmol/L      CO2 25.7 mmol/L      Calcium 9.0 mg/dL      Total Protein 6.9 g/dL      Albumin 3.9 g/dL      ALT (SGPT) 20 U/L      AST (SGOT) 19 U/L      Alkaline Phosphatase 82 U/L      Total Bilirubin 0.5 mg/dL      Globulin 3.0 gm/dL      A/G Ratio 1.3 g/dL      BUN/Creatinine Ratio 11.7     Anion Gap 12.3 mmol/L      eGFR 44.3 mL/min/1.73      Narrative:      GFR Normal >60  Chronic Kidney Disease <60  Kidney Failure <15    The GFR formula is only valid for adults with stable renal function between ages 18 and 70.    C-reactive Protein [[id number]]  (Abnormal) Collected: 04/28/24 0203    Specimen: Blood Updated: 04/28/24 0242     C-Reactive Protein 15.00 mg/dL     CBC Auto Differential [[id number]]  (Abnormal) Collected: 04/28/24 0203    Specimen: Blood Updated: 04/28/24 0219     WBC 15.87 10*3/mm3      RBC 3.95 10*6/mm3      Hemoglobin 11.0 g/dL      Hematocrit 34.7 %      MCV 87.8 fL      MCH 27.8 pg      MCHC 31.7 g/dL      RDW 14.0 %      RDW-SD 44.8 fl      MPV 11.5 fL      Platelets 143 10*3/mm3      Neutrophil % 84.8 %      Lymphocyte % 5.9 %      Monocyte % 8.5 %      Eosinophil % 0.2 %      Basophil % 0.1 %      Immature Grans % 0.5 %      Neutrophils, Absolute 13.46 10*3/mm3      Lymphocytes, Absolute 0.93 10*3/mm3      Monocytes, Absolute 1.35 10*3/mm3      Eosinophils, Absolute 0.03 10*3/mm3      Basophils, Absolute 0.02 10*3/mm3      Immature Grans, Absolute 0.08 10*3/mm3      nRBC 0.0 /100 WBC     Hemoglobin A1c [[id number]]  (Abnormal) Collected: 04/27/24 1703    Specimen: Blood from Arm, Right Updated: 04/27/24 2140     Hemoglobin A1C 7.10 %     Narrative:      Hemoglobin A1C Ranges:    Increased Risk for Diabetes  5.7% to 6.4%  Diabetes                     >= 6.5%  Diabetic Goal                < 7.0%    Urinalysis, Microscopic Only - Urine, Clean Catch [[id number]]  (Abnormal) Collected: 04/27/24 2027    Specimen: Urine, Clean Catch Updated: 04/27/24 2038     RBC, UA Too Numerous to Count /HPF      WBC, UA Too Numerous to Count /HPF      Bacteria, UA 1+ /HPF      Squamous Epithelial Cells, UA None Seen /HPF      Hyaline Casts, UA None Seen /LPF      Methodology Automated Microscopy    Urinalysis With Culture If Indicated - Urine, Clean Catch [[id number]]  (Abnormal) Collected: 04/27/24 2027    Specimen: Urine, Clean Catch Updated: 04/27/24  2038     Color, UA Yellow     Appearance, UA Cloudy     pH, UA 6.5     Specific Gravity, UA >1.030     Glucose,  mg/dL (2+)     Ketones, UA Negative     Bilirubin, UA Negative     Blood, UA Large (3+)     Protein,  mg/dL (2+)     Leuk Esterase, UA Moderate (2+)     Nitrite, UA Positive     Urobilinogen, UA 1.0 E.U./dL    Narrative:      In absence of clinical symptoms, the presence of pyuria, bacteria, and/or nitrites on the urinalysis result does not correlate with infection.    High Sensitivity Troponin T 2Hr [[id number]]  (Abnormal) Collected: 04/27/24 1955    Specimen: Blood from Arm, Right Updated: 04/27/24 2018     HS Troponin T 24 ng/L      Troponin T Delta -2 ng/L     Narrative:      High Sensitive Troponin T Reference Range:  <14.0 ng/L- Negative Female for AMI  <22.0 ng/L- Negative Male for AMI  >=14 - Abnormal Female indicating possible myocardial injury.  >=22 - Abnormal Male indicating possible myocardial injury.   Clinicians would have to utilize clinical acumen, EKG, Troponin, and serial changes to determine if it is an Acute Myocardial Infarction or myocardial injury due to an underlying chronic condition.         STAT Lactic Acid, Reflex [[id number]]  (Normal) Collected: 04/27/24 1955    Specimen: Blood from Arm, Right Updated: 04/27/24 2016     Lactate 1.4 mmol/L     COVID-19 and FLU A/B PCR, 1 HR TAT - Swab, Nasopharynx [[id number]]  (Normal) Collected: 04/27/24 1842    Specimen: Swab from Nasopharynx Updated: 04/27/24 1904     COVID19 Not Detected     Influenza A PCR Not Detected     Influenza B PCR Not Detected    Narrative:      Fact sheet for providers: https://www.fda.gov/media/514780/download    Fact sheet for patients: https://www.fda.gov/media/492934/download    Test performed by PCR.    T4, Free [[id number]]  (Normal) Collected: 04/27/24 1703    Specimen: Blood from Arm, Right Updated: 04/27/24 1752     Free T4 1.37 ng/dL     Narrative:      Results may be falsely increased if  patient taking Biotin.      TSH [[id number]]  (Normal) Collected: 04/27/24 1703    Specimen: Blood from Arm, Right Updated: 04/27/24 1752     TSH 0.918 uIU/mL     C-reactive Protein [[id number]]  (Abnormal) Collected: 04/27/24 1703    Specimen: Blood from Arm, Right Updated: 04/27/24 1750     C-Reactive Protein 5.14 mg/dL     Lactic Acid, Plasma [[id number]]  (Abnormal) Collected: 04/27/24 1703    Specimen: Blood from Arm, Right Updated: 04/27/24 1750     Lactate 2.8 mmol/L     BNP [[id number]]  (Normal) Collected: 04/27/24 1703    Specimen: Blood from Arm, Right Updated: 04/27/24 1748     proBNP 291.2 pg/mL     Narrative:      This assay is used as an aid in the diagnosis of individuals suspected of having heart failure. It can be used as an aid in the diagnosis of acute decompensated heart failure (ADHF) in patients presenting with signs and symptoms of ADHF to the emergency department (ED). In addition, NT-proBNP of <300 pg/mL indicates ADHF is not likely.    Age Range Result Interpretation  NT-proBNP Concentration (pg/mL:      <50             Positive            >450                   Gray                 300-450                    Negative             <300    50-75           Positive            >900                  Gray                300-900                  Negative            <300      >75             Positive            >1800                  Gray                300-1800                  Negative            <300    High Sensitivity Troponin T [[id number]]  (Abnormal) Collected: 04/27/24 1703    Specimen: Blood from Arm, Right Updated: 04/27/24 1748     HS Troponin T 26 ng/L     Narrative:      High Sensitive Troponin T Reference Range:  <14.0 ng/L- Negative Female for AMI  <22.0 ng/L- Negative Male for AMI  >=14 - Abnormal Female indicating possible myocardial injury.  >=22 - Abnormal Male indicating possible myocardial injury.   Clinicians would have to utilize clinical acumen, EKG, Troponin, and serial  "changes to determine if it is an Acute Myocardial Infarction or myocardial injury due to an underlying chronic condition.         Magnesium [[id number]]  (Normal) Collected: 04/27/24 1703    Specimen: Blood from Arm, Right Updated: 04/27/24 1748     Magnesium 1.7 mg/dL     Sedimentation Rate [[id number]]  (Abnormal) Collected: 04/27/24 1703    Specimen: Blood from Arm, Right Updated: 04/27/24 1721     Sed Rate 32 mm/hr     Procalcitonin [[id number]]  (Abnormal) Collected: 04/27/24 1617    Specimen: Blood Updated: 04/27/24 1701     Procalcitonin 0.58 ng/mL     Narrative:      As a Marker for Sepsis (Non-Neonates):    1. <0.5 ng/mL represents a low risk of severe sepsis and/or septic shock.  2. >2 ng/mL represents a high risk of severe sepsis and/or septic shock.    As a Marker for Lower Respiratory Tract Infections that require antibiotic therapy:    PCT on Admission    Antibiotic Therapy       6-12 Hrs later    >0.5                Strongly Recommended  >0.25 - <0.5        Recommended   0.1 - 0.25          Discouraged              Remeasure/reassess PCT  <0.1                Strongly Discouraged     Remeasure/reassess PCT    As 28 day mortality risk marker: \"Change in Procalcitonin Result\" (>80% or <=80%) if Day 0 (or Day 1) and Day 4 values are available. Refer to http://www.[employer]-pct-calculator.com    Change in PCT <=80%  A decrease of PCT levels below or equal to 80% defines a positive change in PCT test result representing a higher risk for 28-day all-cause mortality of patients diagnosed with severe sepsis for septic shock.    Change in PCT >80%  A decrease of PCT levels of more than 80% defines a negative change in PCT result representing a lower risk for 28-day all-cause mortality of patients diagnosed with severe sepsis or septic shock.       Comprehensive Metabolic Panel [[id number]]  (Abnormal) Collected: 04/27/24 1617    Specimen: Blood Updated: 04/27/24 1653     Glucose 299 mg/dL      BUN 25 mg/dL      " Creatinine 1.57 mg/dL      Sodium 136 mmol/L      Potassium 4.2 mmol/L      Chloride 100 mmol/L      CO2 22.1 mmol/L      Calcium 9.3 mg/dL      Total Protein 7.3 g/dL      Albumin 4.0 g/dL      ALT (SGPT) 23 U/L      AST (SGOT) 22 U/L      Alkaline Phosphatase 85 U/L      Total Bilirubin 0.3 mg/dL      Globulin 3.3 gm/dL      A/G Ratio 1.2 g/dL      BUN/Creatinine Ratio 15.9     Anion Gap 13.9 mmol/L      eGFR 46.0 mL/min/1.73     Narrative:      GFR Normal >60  Chronic Kidney Disease <60  Kidney Failure <15    The GFR formula is only valid for adults with stable renal function between ages 18 and 70.    Single High Sensitivity Troponin T [[id number]]  (Abnormal) Collected: 04/27/24 1617    Specimen: Blood Updated: 04/27/24 1652     HS Troponin T 25 ng/L     Narrative:      High Sensitive Troponin T Reference Range:  <14.0 ng/L- Negative Female for AMI  <22.0 ng/L- Negative Male for AMI  >=14 - Abnormal Female indicating possible myocardial injury.  >=22 - Abnormal Male indicating possible myocardial injury.   Clinicians would have to utilize clinical acumen, EKG, Troponin, and serial changes to determine if it is an Acute Myocardial Infarction or myocardial injury due to an underlying chronic condition.         Protime-INR [[id number]]  (Normal) Collected: 04/27/24 1617    Specimen: Blood Updated: 04/27/24 1635     Protime 13.7 Seconds      INR 1.00    Narrative:      Suggested INR therapeutic range for stable oral anticoagulant therapy:    Low Intensity therapy:   1.5-2.0  Moderate Intensity therapy:   2.0-3.0  High Intensity therapy:   2.5-4.0    aPTT [[id number]]  (Abnormal) Collected: 04/27/24 1617    Specimen: Blood Updated: 04/27/24 1635     PTT 35.2 seconds     Narrative:      PTT Heparin Therapeutic Range:  45 - 116 seconds      [place] Draw [[id number]] Collected: 04/27/24 1617    Specimen: Blood Updated: 04/27/24 1631    Narrative:      The following orders were created for panel order [place]  Draw.  Procedure                               Abnormality         Status                     ---------                               -----------         ------                     Green Top (Gel)[[id number]]                                  Final result               Lavender Top[[id number]]                                     Final result               Gold Top - SST[[id number]]                                   Final result               Light Blue Top[[id number]]                                   Final result                 Please view results for these tests on the individual orders.    Green Top (Gel) [[id number]] Collected: 04/27/24 1617    Specimen: Blood Updated: 04/27/24 1631     Extra Tube Hold for add-ons.     Comment: Auto resulted.       Lavender Top [[id number]] Collected: 04/27/24 1617    Specimen: Blood Updated: 04/27/24 1631     Extra Tube hold for add-on     Comment: Auto resulted       Gold Top - SST [[id number]] Collected: 04/27/24 1617    Specimen: Blood Updated: 04/27/24 1631     Extra Tube Hold for add-ons.     Comment: Auto resulted.       Light Blue Top [[id number]] Collected: 04/27/24 1617    Specimen: Blood Updated: 04/27/24 1631     Extra Tube Hold for add-ons.     Comment: Auto resulted       CBC & Differential [[id number]]  (Abnormal) Collected: 04/27/24 1617    Specimen: Blood Updated: 04/27/24 1624    Narrative:      The following orders were created for panel order CBC & Differential.  Procedure                               Abnormality         Status                     ---------                               -----------         ------                     CBC Auto Differential[[id number]]        Abnormal            Final result                 Please view results for these tests on the individual orders.    CBC Auto Differential [[id number]]  (Abnormal) Collected: 04/27/24 1617    Specimen: Blood Updated: 04/27/24 1624     WBC 15.54 10*3/mm3      RBC 4.06 10*6/mm3      Hemoglobin 11.7 g/dL       Hematocrit 35.7 %      MCV 87.9 fL      MCH 28.8 pg      MCHC 32.8 g/dL      RDW 13.6 %      RDW-SD 43.9 fl      MPV 11.4 fL      Platelets 146 10*3/mm3      Neutrophil % 86.5 %      Lymphocyte % 3.5 %      Monocyte % 9.0 %      Eosinophil % 0.5 %      Basophil % 0.1 %      Immature Grans % 0.4 %      Neutrophils, Absolute 13.44 10*3/mm3      Lymphocytes, Absolute 0.54 10*3/mm3      Monocytes, Absolute 1.40 10*3/mm3      Eosinophils, Absolute 0.08 10*3/mm3      Basophils, Absolute 0.02 10*3/mm3      Immature Grans, Absolute 0.06 10*3/mm3      nRBC 0.0 /100 WBC              Physician Progress Notes (most recent note)        Justin Aguilar DO at 24              [facility] HOSPITALIST PROGRESS NOTE     Patient Identification:  Name:  Jamin Julio  Age:  73 y.o.  Sex:  male  :  1950  MRN:  [id number]  Visit Number:  [id number]  ROOM: [address] [place] [address]     Primary Care Provider:  Christophe Jamil PA-C    Length of stay in inpatient status:  3    Subjective     Chief Compliant:    Chief Complaint   Patient presents with    Altered Mental Status       History of Presenting Illness: Patient seen and evaluated in follow-up for ESBL bacteremia felt to be of urologic origin given recent obstructing ureteral stone status post stent placement.  Patient time evaluation resting comfortably in bed with no acute complaints.  Lab work trending appropriately and patient significantly improved with no further fever.    Objective     Current Hospital Meds:  aspirin, 81 mg, Oral, Daily  budesonide-formoterol, 2 puff, Inhalation, BID  carvedilol, 3.125 mg, Oral, BID With Meals  cetirizine, 10 mg, Oral, Daily  cholecalciferol, 1,000 Units, Oral, Daily  enoxaparin, 40 mg, Subcutaneous, Nightly  ertapenem, 1,000 mg, Intravenous, Q24H  finasteride, 5 mg, Oral, Daily  insulin glargine, 5 Units, Subcutaneous, Nightly  insulin lispro, 2-9 Units, Subcutaneous, 4x Daily AC & at Bedtime  latanoprost, 1 drop, Both Eyes,  Daily  lisinopril, 20 mg, Oral, Q24H  lubiprostone, 8 mcg, Oral, BID  montelukast, 10 mg, Oral, Nightly  mupirocin, 1 application , Each Nare, BID  oxybutynin XL, 5 mg, Oral, Daily  pantoprazole, 40 mg, Oral, Q AM  rosuvastatin, 10 mg, Oral, Daily  sodium chloride, 10 mL, Intravenous, Q12H  sodium chloride, 10 mL, Intravenous, Q12H  tamsulosin, 0.4 mg, Oral, Daily      sodium chloride, 100 mL/hr, Last Rate: 100 mL/hr (04/30/24 0318)      ----------------------------------------------------------------------------------------------------------------------  Vital Signs:  Temp:  [97.8 °F (36.6 °C)-99.4 °F (37.4 °C)] 98.2 °F (36.8 °C)  Heart Rate:  [68-84] 73  Resp:  [18-20] 18  BP: (148-172)/(64-75) 170/75  SpO2:  [98 %-99 %] 98 %  on   ;   Device (Oxygen Therapy): room air  Body mass index is 24.57 kg/m².      Intake/Output Summary (Last 24 hours) at 4/30/2024 1930  Last data filed at 4/30/2024 1200  Gross per 24 hour   Intake 720 ml   Output --   Net 720 ml      ----------------------------------------------------------------------------------------------------------------------  Physical exam:  Constitutional: Elderly adult male resting in bed in no distress.      HENT:  Head:  Normocephalic and atraumatic.  Mouth:  Moist mucous membranes.    Eyes:  Conjunctivae and EOM are normal. No scleral icterus.    Cardiovascular:  Normal rate, regular rhythm and normal heart sounds with no murmur.  Pulmonary/Chest:  No respiratory distress, no wheezes, no crackles, with normal breath sounds and good air movement.  Abdominal:  Soft.  Bowel sounds are normal.  No distension but mild tenderness to palpation in the right lower quadrant bordering on suprapubic region.   Musculoskeletal:  No tenderness, and no deformity.  No red or swollen joints anywhere.   Neurological:  Alert and oriented to person, place, and time.  No cranial nerve deficit.  No tongue deviation.  No facial droop.  No slurred speech. Intact Sensation  throughout  Skin:  Skin is warm and dry. No rash or lesion noted. No pallor.   Peripheral vascular:  Pulses in all 4 extremities with no clubbing, no cyanosis, no edema.  Psychiatric: Appropriate mood and affect, pleasant.   ----------------------------------------------------------------------------------------------------------------------  WBC/HGB/HCT/PLT   13.79/10.1/31.7/141 (04/30 0024)  BUN/CREAT/GLUC/ALT/AST/EMBER/LIP    18/1.24/157/--/--/--/-- (04/30 0024)  LYTES - Na/K/Cl/CO2: 140/3.6/105/23.4 (04/30 0024)        Urine Culture   Date Value Ref Range Status   04/27/2024 25,000 CFU/mL Gram Negative Bacilli (A)  Preliminary     Blood Culture   Date Value Ref Range Status   04/28/2024 No growth at 24 hours  Preliminary   04/28/2024 No growth at 24 hours  Preliminary   04/27/2024 Escherichia coli (C)  Preliminary   04/27/2024 Escherichia coli (C)  Preliminary       I have personally looked at the labs and they are summarized above.  ----------------------------------------------------------------------------------------------------------------------  Detailed radiology reports for the last 24 hours:  FL Video Swallow Single Contrast    Result Date: 4/29/2024      Please see the speech pathologist's report for additional information.   This report was finalized on 4/29/2024 2:37 PM by Dr. Fernando Wise MD.     Assessment & Plan      ESBL bacteremia  Acute kidney injury, resolved  Obstructing ureteral stone s/p stent    -Urine culture 25,000 colonies of units of gram-negative bacilli with blood cultures showing E. coli in 2 out of 2 bottles with evidence of ESBL detection on PCR.    -Patient initiated on ertapenem once daily 1 g x 14 days through 5/11/2024    -Repeat blood cultures thus far with no growth to date     -With successful midline placement to complete a 14-day course of-IV antibiotic therapy.    -Plan for discharge home tomorrow if IV antibiotic therapy able to be arranged    Diabetes mellitus type 2     -Continue basal and bolus insulin as needed-pain glycemic control.        Copied text in portions of the note has been reviewed and is accurate as of 24    VTE Prophylaxis:   Mechanical Order History:        Ordered        24  Place Sequential Compression Device  Once,   Status:  Canceled            24  Maintain Sequential Compression Device  Continuous,   Status:  Canceled                          Pharmalogical Order History:        Ordered     Dose Route Frequency Stop    24  Enoxaparin Sodium (LOVENOX) syringe 40 mg         40 mg SC Nightly --    24  Pharmacy to Dose enoxaparin (LOVENOX)  Status:  Discontinued        Question:  Indication of use  Answer:  Prophylaxis    -- XX Continuous PRN 24                    Disposition Home in the next 24 hours    Justin Aguilar DO  [facility]  24  19:30 EDT      Electronically signed by Justin Aguilar DO at 24 1935          Consult Notes (most recent note)        Teresita Alcaraz APRN at 24 1336        Consult Orders    1. Inpatient Infectious Diseases Consult [[id number]] ordered by Navin Carlos MD at 24 1140                         INFECTIOUS DISEASE CONSULTATION REPORT        Patient Identification:  Name:  Jamin Julio  Age:  73 y.o.  Sex:  male  :  1950  MRN:  [id number]   Visit Number:  [id number]  Primary Care Physician:  Christophe Jamil PA-C        Referring Provider: Dr. Carlos    Reason for consult: ESBL bacteremia       LOS: 1 day        Subjective       Subjective     History of present illness:      Thank you Dr. Carlos for allowing us to participate in the care of your patient.  As you well know, Mr. Jamin Julio is a 73 y.o. male with past medical history significant for asthma, black lung, arthritis, type 2 diabetes, hypertension, hyperlipidemia, GERD, JUANCARLOS and kidney stones who recently underwent cystoscopy with lithotripsy of left  obstructing ureteral stones and left ureteral stent placement., who presented to [facility] Emergency Department on 4/27/2024 for confusion, incontinence, fever.  WBC 15.87.  CRP 15.00.  Urinalysis from 4/27/2024 positive culture currently in progress.  Blood cultures from 4/27/2024 2 out of 2 sets positive for ESBL E. coli.  Lactic acid 2.8 on admission.  Chest x-ray from 4/27/2024 small nodular opacities scattered throughout the lungs.  CT of the chest reports constellation of findings compatible with sarcoidosis, small pericardial effusion, CT of the abdomen pelvis, mild to moderate left hydronephrosis with double-J ureteral stent in place, defect in the left renal pelvis is likely due to a small clot from the recent intervention rather than neoplasm, mildly distended gallbladder with numerous calculi.      Infectious Disease consultation was requested for antimicrobial management.      ---------------------------------------------------------------------------------------------------------------------     Review Of Systems:    Constitutional: Positive fever, no chills and night sweats. No appetite change or unexpected weight change. No fatigue.  Eyes: no eye drainage, itching or redness.  HEENT: no mouth sores, dysphagia or nose bleed.  Respiratory: no for shortness of breath, cough or production of sputum.  Cardiovascular: no chest pain, no palpitations, no orthopnea.  Gastrointestinal: no nausea, vomiting or diarrhea. No abdominal pain, hematemesis or rectal bleeding.  Genitourinary: no dysuria or polyuria.  Hematologic/lymphatic: no lymph node abnormalities, no easy bruising or easy bleeding.  Musculoskeletal: no muscle or joint pain.  Skin: No rash and no itching.  Neurological: no loss of consciousness, no seizure, no headache.  Behavioral/Psych: no depression or suicidal ideation.  Endocrine: no hot flashes.  Immunologic:  negative.    ---------------------------------------------------------------------------------------------------------------------     Past Medical History    Past Medical History:   Diagnosis Date    Arthritis     Asthma     Black lung disease     Diabetes mellitus     Elevated cholesterol     GERD (gastroesophageal reflux disease)     Hypertension     Kidney stone     Sleep apnea     no c-pap       Past Surgical History    Past Surgical History:   Procedure Laterality Date    CATARACT EXTRACTION WITH INTRAOCULAR LENS IMPLANT Bilateral     COLONOSCOPY      CYST REMOVAL Right     hip       Family History    Family History   Problem Relation Age of Onset    Prostate cancer Father        Social History    Social History     Tobacco Use    Smoking status: Never     Passive exposure: Never    Smokeless tobacco: Current     Types: Snuff   Vaping Use    Vaping status: Never Used   Substance Use Topics    Alcohol use: Never    Drug use: Never       Allergies    Patient has no known allergies.  ---------------------------------------------------------------------------------------------------------------------     Home Medications:    Prior to Admission Medications       Prescriptions Last Dose Informant Patient Reported? Taking?    albuterol sulfate  (90 Base) MCG/ACT inhaler   Yes No    Inhale 2 puffs Every 4 (Four) Hours As Needed for Shortness of Air.    Aspirin Low Dose 81 MG EC tablet   Yes No    Take 1 tablet by mouth Daily.    budesonide-formoterol (SYMBICORT) 160-4.5 MCG/ACT inhaler   Yes No    Inhale 2 puffs 2 (Two) Times a Day. Rinse mouth after each use    carvedilol (COREG) 3.125 MG tablet   Yes No    Take 1 tablet by mouth 2 (Two) Times a Day With Meals.    glimepiride (AMARYL) 1 MG tablet   Yes No    1 tablet.    HYDROcodone-acetaminophen (NORCO)  MG per tablet   No No    Take 1 tablet by mouth Every 6 (Six) Hours As Needed for Moderate Pain (Pain).    Januvia 100 MG tablet   Yes No    1 tablet.     lactulose (CHRONULAC) 10 GM/15ML solution   No No    Take 30 mL by mouth 2 (Two) Times a Day As Needed (for constipation).    latanoprost (XALATAN) 0.005 % ophthalmic solution   Yes No    INSTILL 1 DROP IN BOTH EYES EVERY DAY IN THE EVENING    levocetirizine (XYZAL) 5 MG tablet   Yes No    Take 1 tablet by mouth every night at bedtime.    linaclotide (LINZESS) 145 MCG capsule capsule   No No    Take 1 capsule by mouth Every Morning Before Breakfast.    lisinopril (PRINIVIL,ZESTRIL) 20 MG tablet   Yes No    Take 1 tablet by mouth every night at bedtime.    meloxicam (MOBIC) 15 MG tablet   Yes No    metFORMIN (GLUCOPHAGE) 500 MG tablet   Yes No    take 1 tablet by mouth twice daily with the morning and evening meal    metoprolol tartrate (LOPRESSOR) 25 MG tablet   Yes No    Take  by mouth.    Mirabegron ER (MYRBETRIQ) 25 MG tablet sustained-release 24 hour 24 hr tablet   No No    Take 1 tablet by mouth Daily.    montelukast (SINGULAIR) 10 MG tablet   Yes No    omeprazole (priLOSEC) 20 MG capsule   Yes No    TAKE 1 CAPSULE BY MOUTH EVERY DAY 30 MINUTES TO 1 HOUR BEFORE A MEAL    rosuvastatin (CRESTOR) 10 MG tablet   Yes No    Take 1 tablet by mouth Daily.    tamsulosin (FLOMAX) 0.4 MG capsule 24 hr capsule   No No    Take 1 capsule by mouth Daily.          ---------------------------------------------------------------------------------------------------------------------    Objective       Objective     Hospital Scheduled Meds:  insulin lispro, 2-9 Units, Subcutaneous, 4x Daily AC & at Bedtime  meropenem, 1,000 mg, Intravenous, Q12H  sodium chloride, 10 mL, Intravenous, Q12H      sodium chloride, 100 mL/hr, Last Rate: 100 mL/hr (04/28/24 0937)      ---------------------------------------------------------------------------------------------------------------------   Vital Signs:  Temp:  [97.7 °F (36.5 °C)-102.9 °F (39.4 °C)] 100.3 °F (37.9 °C)  Heart Rate:  [] 81  Resp:  [14-18] 18  BP: (133-199)/(60-82)  133/60  Mean Arterial Pressure (Non-Invasive) for the past 24 hrs (Last 3 readings):   Noninvasive MAP (mmHg)   04/28/24 1135 80   04/28/24 0635 95   04/27/24 2032 104     SpO2 Percentage    04/27/24 2032 04/28/24 0635 04/28/24 1135   SpO2: 97% 96% 99%     SpO2:  [93 %-99 %] 99 %  on   ;   Device (Oxygen Therapy): room air    Body mass index is 24.28 kg/m².  Wt Readings from Last 3 Encounters:   04/28/24 70.3 kg (155 lb)   04/26/24 71.7 kg (158 lb)   04/19/24 72.1 kg (159 lb)     ---------------------------------------------------------------------------------------------------------------------     Physical Exam:    Constitutional: Elderly  male.  Currently on on room air with no apparent distress.    HENT:  Head: Normocephalic and atraumatic.  Mouth:  Moist mucous membranes.    Eyes:  Conjunctivae and EOM are normal.  No scleral icterus.  Neck:  Neck supple.  No JVD present.    Cardiovascular:  Normal rate, regular rhythm and normal heart sounds with no murmur. No edema.  Pulmonary/Chest:  No respiratory distress, no wheezes, no crackles, with normal breath sounds and good air movement.  Abdominal:  Soft.  Bowel sounds are normal.  No distension and no tenderness.   Musculoskeletal:  No edema, no tenderness, and no deformity.  No swelling or redness of joints.  Neurological:  Alert and oriented to person, place only.  Skin:  Skin is warm and dry.  No rash noted.  No pallor.   Psychiatric:  Normal mood and affect.  Behavior is normal.    ---------------------------------------------------------------------------------------------------------------------    Results from last 7 days   Lab Units 04/27/24 1955 04/27/24 1703 04/27/24  1617   HSTROP T ng/L 24* 26* 25*     Results from last 7 days   Lab Units 04/27/24  1703   PROBNP pg/mL 291.2         Results from last 7 days   Lab Units 04/28/24  0203 04/27/24  1955 04/27/24  1703 04/27/24  1617 04/24/24  1403   CRP mg/dL 15.00*  --  5.14*  --   --    LACTATE  "mmol/L  --  1.4 2.8*  --   --    WBC 10*3/mm3 15.87*  --   --  15.54* 8.92   HEMOGLOBIN g/dL 11.0*  --   --  11.7* 11.0*   HEMATOCRIT % 34.7*  --   --  35.7* 34.2*   MCV fL 87.8  --   --  87.9 89.5   MCHC g/dL 31.7  --   --  32.8 32.2   PLATELETS 10*3/mm3 143  --   --  146 168   INR   --   --   --  1.00  --      Results from last 7 days   Lab Units 04/28/24  0203 04/27/24  1703 04/27/24  1617 04/24/24  1403   SODIUM mmol/L 143  --  136 141   POTASSIUM mmol/L 4.1  --  4.2 4.6   MAGNESIUM mg/dL  --  1.7  --   --    CHLORIDE mmol/L 105  --  100 104   CO2 mmol/L 25.7  --  22.1 28.1   BUN mg/dL 19  --  25* 24*   CREATININE mg/dL 1.62*  --  1.57* 1.77*   CALCIUM mg/dL 9.0  --  9.3 9.7   GLUCOSE mg/dL 127*  --  299* 143*   ALBUMIN g/dL 3.9  --  4.0  --    BILIRUBIN mg/dL 0.5  --  0.3  --    ALK PHOS U/L 82  --  85  --    AST (SGOT) U/L 19  --  22  --    ALT (SGPT) U/L 20  --  23  --    Estimated Creatinine Clearance: 39.8 mL/min (A) (by C-G formula based on SCr of 1.62 mg/dL (H)).  No results found for: \"AMMONIA\"    Hemoglobin A1C   Date/Time Value Ref Range Status   04/27/2024 1703 7.10 (H) 4.80 - 5.60 % Final     Glucose   Date/Time Value Ref Range Status   04/28/2024 1205 120 70 - 130 mg/dL Final   04/28/2024 0641 190 (H) 70 - 130 mg/dL Final   04/27/2024 2327 208 (H) 70 - 130 mg/dL Final   04/27/2024 1603 284 (H) 70 - 130 mg/dL Final   04/26/2024 0934 151 (H) 70 - 130 mg/dL Final     Lab Results   Component Value Date    HGBA1C 7.10 (H) 04/27/2024     Lab Results   Component Value Date    TSH 0.918 04/27/2024    FREET4 1.37 04/27/2024       Blood Culture   Date Value Ref Range Status   04/27/2024 Abnormal Stain (C)  Preliminary   04/27/2024 Abnormal Stain (C)  Preliminary     Urine Culture   Date Value Ref Range Status   04/27/2024 Culture in progress  Preliminary     No results found for: \"WOUNDCX\"  No results found for: \"STOOLCX\"  No results found for: \"RESPCX\"  Pain Management Panel           No data to display    "           I have personally reviewed the above laboratory results.   ---------------------------------------------------------------------------------------------------------------------  Imaging Results (Last 7 Days)       Procedure Component Value Units Date/Time    CT Chest Without Contrast Diagnostic [[id number]] Collected: 04/27/24 1758     Updated: 04/27/24 1813    Narrative:      CLINICAL HISTORY: COPD suspected, abdominal pain.     COMPARISON: CT of the abdomen and pelvis on 4/26/2024.     TECHNIQUE: Noncontrast CT of the chest, abdomen and pelvis was obtained.   Multiplanar reformats were generated Limited exposure control,  adjustment of the mA and/or KV according to patient size or use of  iterative reconstruction technique was utilized.     FINDINGS:     CT CHEST:     Pulmonary arteries: normal.      Heart and pericardium: Normal heart size.  Pericardial effusion.   Extensive coronary calcifications..     Aorta: normal.     Esophagus: normal.     Lungs and airways: Upper lobe predominant nodules and areas of  consolidation along the bronchovascular bundles with mild volume loss  and scarring.  Subsegmental dependent bibasilar atelectasis is noted.   Central airways are patent.     Pleura: normal.     Lymph nodes: Mediastinal and hilar lymph nodes are calcified.     Musculoskeletal and chest wall: no acute abnormality.     Other: n/a        CT ABDOMEN AND PELVIS:     Hepatobiliary: Gallbladder is moderately distended and contains numerous  calculi.  The liver is normal.     Pancreas: normal.     Spleen: Calcified granulomata are scattered to the otherwise normal  spleen.     Adrenals: normal.      Kidneys, ureters, bladder: Since the prior study, the patient has  undergone placement of a double-J ureteral stent in the left renal  collecting system, with the proximal loop in the upper pole calyx and  the distal loop in the urinary bladder.  The stone seen on the prior  study are not as well-seen on this  exam and may have been removed or not  visible due to the contrast media in the renal collecting system.   Filling defects in the renal pelvis may be due to small clot from a  recent intervention.  Correlate with the intra-procedural retrograde  urogram.  The right kidney and ureter are normal.  Urinary bladder is  mildly trabeculated.     Reproductive: Ag gland is mildly enlarged at 5.6 cm in diameter and  is partially calcified.     GI tract/Bowel: Sigmoid diverticulosis without findings for acute  diverticulitis.  No bowel obstruction.  No pneumatosis or portal venous  gas..     Appendix: normal.     Peritoneum: normal, no free air or fluid.     Vascular: Aortoiliac atherosclerosis.     Lymph nodes: normal.     Musculoskeletal and abdominal wall: Multilevel degenerative changes in  the lumbar spine       Impression:         CT CHEST:  CONSTELLATION OF FINDINGS COMPATIBLE WITH SARCOIDOSIS.     SMALL PERICARDIAL EFFUSION.     CT ABDOMEN AND PELVIS:  MILD TO MODERATE LEFT HYDRONEPHROSIS WITH DOUBLE-J URETERAL STENT IN  PLACE.     DEFECT IN THE LEFT RENAL PELVIS IS LIKELY DUE TO A SMALL CLOT FROM THE  RECENT INTERVENTION RATHER THAN A NEOPLASM.  CORRELATE WITH THE PREVIOUS  INTRAOPERATIVE UROGRAM.     MILDLY DISTENDED GALLBLADDER WITH NUMEROUS CALCULI.  NO ADDITIONAL  FINDINGS TO SUGGEST ACUTE CHOLECYSTITIS.     This report was finalized on 4/27/2024 6:10 PM by Trish Chang MD.       CT Abdomen Pelvis Without Contrast [[id number]] Collected: 04/27/24 1758     Updated: 04/27/24 1813    Narrative:      CLINICAL HISTORY: COPD suspected, abdominal pain.     COMPARISON: CT of the abdomen and pelvis on 4/26/2024.     TECHNIQUE: Noncontrast CT of the chest, abdomen and pelvis was obtained.   Multiplanar reformats were generated Limited exposure control,  adjustment of the mA and/or KV according to patient size or use of  iterative reconstruction technique was utilized.     FINDINGS:     CT CHEST:     Pulmonary arteries:  normal.      Heart and pericardium: Normal heart size.  Pericardial effusion.   Extensive coronary calcifications..     Aorta: normal.     Esophagus: normal.     Lungs and airways: Upper lobe predominant nodules and areas of  consolidation along the bronchovascular bundles with mild volume loss  and scarring.  Subsegmental dependent bibasilar atelectasis is noted.   Central airways are patent.     Pleura: normal.     Lymph nodes: Mediastinal and hilar lymph nodes are calcified.     Musculoskeletal and chest wall: no acute abnormality.     Other: n/a        CT ABDOMEN AND PELVIS:     Hepatobiliary: Gallbladder is moderately distended and contains numerous  calculi.  The liver is normal.     Pancreas: normal.     Spleen: Calcified granulomata are scattered to the otherwise normal  spleen.     Adrenals: normal.      Kidneys, ureters, bladder: Since the prior study, the patient has  undergone placement of a double-J ureteral stent in the left renal  collecting system, with the proximal loop in the upper pole calyx and  the distal loop in the urinary bladder.  The stone seen on the prior  study are not as well-seen on this exam and may have been removed or not  visible due to the contrast media in the renal collecting system.   Filling defects in the renal pelvis may be due to small clot from a  recent intervention.  Correlate with the intra-procedural retrograde  urogram.  The right kidney and ureter are normal.  Urinary bladder is  mildly trabeculated.     Reproductive: Ag gland is mildly enlarged at 5.6 cm in diameter and  is partially calcified.     GI tract/Bowel: Sigmoid diverticulosis without findings for acute  diverticulitis.  No bowel obstruction.  No pneumatosis or portal venous  gas..     Appendix: normal.     Peritoneum: normal, no free air or fluid.     Vascular: Aortoiliac atherosclerosis.     Lymph nodes: normal.     Musculoskeletal and abdominal wall: Multilevel degenerative changes in  the lumbar  spine       Impression:         CT CHEST:  CONSTELLATION OF FINDINGS COMPATIBLE WITH SARCOIDOSIS.     SMALL PERICARDIAL EFFUSION.     CT ABDOMEN AND PELVIS:  MILD TO MODERATE LEFT HYDRONEPHROSIS WITH DOUBLE-J URETERAL STENT IN  PLACE.     DEFECT IN THE LEFT RENAL PELVIS IS LIKELY DUE TO A SMALL CLOT FROM THE  RECENT INTERVENTION RATHER THAN A NEOPLASM.  CORRELATE WITH THE PREVIOUS  INTRAOPERATIVE UROGRAM.     MILDLY DISTENDED GALLBLADDER WITH NUMEROUS CALCULI.  NO ADDITIONAL  FINDINGS TO SUGGEST ACUTE CHOLECYSTITIS.     This report was finalized on 4/27/2024 6:10 PM by Trish Chang MD.       XR Chest 1 View [[id number]] Collected: 04/27/24 1756     Updated: 04/27/24 1759    Narrative:      CLINICAL HISTORY: Altered mental status.     COMPARISON: CT chest 04/27/2024.     TECHNIQUE: Single portable upright AP view of the chest was obtained.     FINDINGS: Small nodular opacities are scattered the lungs.  No pleural  effusion or pneumothorax is seen.  Heart size is mildly enlarged.  No  acute osseous or upper abdominal abnormality is seen.       Impression:         SMALL NODULAR OPACITIES SCATTERED THROUGH THE LUNGS. CT HAS ALREADY BEEN  PERFORMED.     CARDIOMEGALY.           This report was finalized on 4/27/2024 5:57 PM by Trish Chang MD.       CT CEREBRAL PERFUSION WITH & WITHOUT CONTRAST [[id number]] Collected: 04/27/24 1646     Updated: 04/27/24 1648    Narrative:      EXAMINATION: CT CEREBRAL PERFUSION W WO CONTRAST-      CLINICAL INDICATION:Neuro deficit, acute, stroke suspected     COMPARISON: CT, CTA same day     TECHNIQUE: Axial computed tomography images of the brain without and  with intravenous contrast using cerebral perfusion protocol.   Post-processing parametric maps were created using Framebench software and  reviewed.  Sagittal and coronal reformatted images were created and  reviewed.  This CT exam was performed using one or more of the following  dose reduction techniques:  automated exposure control,  adjustment of  the mA and/or kV according to patient size, and/or use of iterative  reconstruction technique.     FINDINGS:     Arterial input function is optimal.      PERFUSION PARAMETERS:  Ischemic tissue volume (Tmax > 6 sec.): 4 mL.  Infarct core volume (CBF < 30%): 0  mL.  Mismatch (penumbra) volume: 0  mL.  Mismatch ratio: Not applicable.  Location/territory: Left frontal lobe.     COLLATERAL CIRCULATION PARAMETERS:  Volume poor collateral perfusion (Tmax > 10 sec.): 0  mL.  Hypoperfusion index (Tmax >10 sec./Tmax > 6 sec.): 0   CBV Index (rCBV in Tmax > 6 sec. region): 0.6     OTHER:  No additional remarkable findings.       Impression:      Artifact versus ischemia parameters in the left frontal lobe. No core  infarct identified.        This report was finalized on 4/27/2024 4:46 PM by Dr. Eric Stokes MD.       CT Angiogram Head w AI Analysis of LVO [[id number]] Collected: 04/27/24 1641     Updated: 04/27/24 1645    Narrative:      EXAM:    CT Angiography Head With Intravenous Contrast     EXAM DATE:    4/27/2024 4:08 PM     CLINICAL HISTORY:    Stroke, follow up     TECHNIQUE:    Axial computed tomographic angiography images of the head with  intravenous contrast. LVO analysis was performed with RAPID.AI software.   This CT exam was performed using one or more of the following dose  reduction techniques:  automated exposure control, adjustment of the mA  and/or kV according to patient size, and/or use of iterative  reconstruction technique.    MIP reconstructed images were created and reviewed.     COMPARISON:    No relevant prior studies available.     FINDINGS:    Right internal carotid artery:  Moderate calcifications intracranial  right ICA segments without significant stenosis or occlusion.  No  aneurysm.    Right anterior cerebral artery:  Unremarkable as visualized.  No  occlusion or significant stenosis.  No aneurysm.    Right middle cerebral artery:  Unremarkable as visualized.  No  occlusion or  significant stenosis.  No aneurysm.    Right posterior cerebral artery:  Unremarkable as visualized.  No  occlusion or significant stenosis.  No aneurysm.    Right vertebral artery:  Right vertebral artery dominance incidentally  noted.       Left internal carotid artery:  Moderate calcifications of the left  intracranial ICA segments without significant stenosis or occlusion.  No  aneurysm.    Left anterior cerebral artery:  Unremarkable as visualized.  No  occlusion or significant stenosis.  No aneurysm.    Left middle cerebral artery:  Unremarkable as visualized.  No  occlusion or significant stenosis.  No aneurysm.    Left posterior cerebral artery:  Unremarkable as visualized.  No  occlusion or significant stenosis.  No aneurysm.    Left vertebral artery:  Unremarkable as visualized.       Basilar artery:  Unremarkable as visualized.  No occlusion or  significant stenosis.  No aneurysm.       Impression:        Essentially unremarkable CTA of the brain with no large vessel  occlusion.        This report was finalized on 4/27/2024 4:42 PM by Dr. Eric Stokes MD.       CT Angiogram Neck [[id number]] Collected: 04/27/24 1639     Updated: 04/27/24 1643    Narrative:      EXAM:    CT Angiography Neck With Intravenous Contrast     EXAM DATE:    4/27/2024 4:08 PM     CLINICAL HISTORY:    Stroke, follow up     TECHNIQUE:    Axial computed tomographic angiography images of the neck with  intravenous contrast.  This CT exam was performed using one or more of  the following dose reduction techniques:  automated exposure control,  adjustment of the mA and/or kV according to patient size, and/or use of  iterative reconstruction technique.    MIP reconstructed images were created and reviewed.     COMPARISON:    None.     FINDINGS:      VASCULATURE:    Right common carotid artery:  Unremarkable as visualized.  No  occlusion or significant stenosis.  No dissection.    Right internal carotid artery:  Tortuosity right ICA. No  significant  stenosis or occlusion.  Mild plaque proximal right ICA without  significant stenosis or occlusion.    Right external carotid artery:  Unremarkable as visualized.  No  occlusion.    Right vertebral artery:  Right vertebral artery dominant system.  No  occlusion or significant stenosis.  No dissection.       Left common carotid artery:  Unremarkable as visualized.  No occlusion  or significant stenosis.  No dissection.    Left internal carotid artery:  Mild calcified plaque proximal left ICA  without significant stenosis or occlusion.    Left external carotid artery:  Unremarkable as visualized.  No  occlusion.    Left vertebral artery:  Unremarkable as visualized.  No occlusion or  significant stenosis.  No dissection.    Aorta:  Four-vessel arch configuration incidentally noted.      NECK:    Bones/joints:  Degenerative changes cervical spine with multilevel  stenosis.    Soft tissues:  Unremarkable as visualized.    Lung apices:  Bilateral upper lobe airspace disease most consistent  with pneumonia with nodular type opacities also present.      CAROTID STENOSIS REFERENCE USING NASCET CRITERIA:    % ICA stenosis = (1 - narrowest ICA diameter/diameter of distal  cervical ICA) x 100.    Mild - <50% stenosis.    Moderate - 50-69% stenosis.    Severe - 70-94% stenosis.    Near occlusion - 95-99% stenosis.    Occluded - 100% stenosis.       Impression:         1. Minimal plaque both carotid systems. No significant stenosis or  occlusion.  2. Vertebral arteries appear within normal limits with right vertebral  artery dominance noted.  3. Bilateral upper lobe airspace disease most consistent with pneumonia  with nodular type opacities also present.  4. Other nonacute findings above.        This report was finalized on 4/27/2024 4:41 PM by Dr. Eric Stokes MD.       CT Head Without Contrast Stroke Protocol [[id number]] Collected: 04/27/24 1621     Updated: 04/27/24 1624    Narrative:      EXAM:    CT Head  Without Intravenous Contrast     EXAM DATE:    4/27/2024 4:08 PM     CLINICAL HISTORY:    Neuro deficit, acute stroke suspected     TECHNIQUE:    Axial computed tomography images of the head/brain without intravenous  contrast.  Sagittal and coronal reformatted images were created and  reviewed.  This CT exam was performed using one or more of the following  dose reduction techniques:  automated exposure control, adjustment of  the mA and/or kV according to patient size, and/or use of iterative  reconstruction technique.     COMPARISON:    No relevant prior studies available.     FINDINGS:    Brain and extra-axial spaces:  Mild cerebral atrophy.  Moderate  chronic small vessel ischemic disease.  No hemorrhage.    Bones/joints:  Unremarkable as visualized.  No acute fracture.    Soft tissues:  Unremarkable as visualized.    Sinuses:  Mild chronic paranasal sinus disease.    Mastoid air cells:  Unremarkable as visualized.  No mastoid effusion.       Impression:      1.  No CT evidence of acute intracranial abnormality.  2.  Moderate chronic small vessel ischemic disease and mild age-related  cerebral atrophy noted.  3.  Mild chronic paranasal sinus disease.        This report was finalized on 4/27/2024 4:21 PM by Dr. Eric Stokes MD.             I have personally reviewed the above radiology results.   ---------------------------------------------------------------------------------------------------------------------      Pertinent Infectious Disease Results          Assessment & Plan      Assessment        ESBL bacteremia  Complicated UTI      Plan      Patient presented to [facility] Department on 4/27/2024 for confusion, incontinence, fever.  WBC 15.87.  CRP 15.00.  Urinalysis from 4/27/2024 positive culture currently in progress.  Blood cultures from 4/27/2024 2 out of 2 sets positive for ESBL E. coli.  Lactic acid 2.8 on admission.  Chest x-ray from 4/27/2024 small nodular opacities  scattered throughout the lungs.  CT of the chest reports constellation of findings compatible with sarcoidosis, small pericardial effusion, CT of the abdomen pelvis, mild to moderate left hydronephrosis with double-J ureteral stent in place, defect in the left renal pelvis is likely due to a small clot from the recent intervention rather than neoplasm, mildly distended gallbladder with numerous calculi.    Based on culture preliminary results we will continue meropenem pharmacy to dose monotherapy for treatment of ESBL bacteremia secondary to urinary source.  Patient will likely require prolonged antibiotic therapy in the setting of stent placement.  We will continue to follow closely and adjust antibiotic therapy as needed.      ANTIMICROBIAL THERAPY    meropenem (MERREM) 1000 mg IVPB in 100 mL NS (VTB)       Again, thank you Dr. Carlos for allowing us to participate in the care of your patient and please feel free to call for any questions you may have.        Code Status:     Code Status and Medical Interventions:   Ordered at: 04/27/24 1984     Code Status (Patient has no pulse and is not breathing):    CPR (Attempt to Resuscitate)     Medical Interventions (Patient has pulse or is breathing):    Full Support         KENDRA Cantu  04/28/24  13:36 EDT    Electronically signed by Teresita Alcaraz APRN at 04/28/24 2096

## 2024-05-01 NOTE — PROGRESS NOTES
PROGRESS NOTE         Patient Identification:  Name:  Jamin Julio  Age:  73 y.o.  Sex:  male  :  1950  MRN:  [id number]  Visit Number:  [id number]  Primary Care Provider:  Christophe Jamil PA-C         LOS: 4 days       ----------------------------------------------------------------------------------------------------------------------  Subjective       Chief Complaints:    Altered Mental Status        Interval History:      Patient sitting up in recliner this morning.  Overall feels better today.  Family at bedside.  Currently on room air with no apparent distress.  Fever is resolved, denies diarrhea.  Lungs clear to auscultation bilaterally.  Abdomen soft, nontender.  WBC improved, now normalized at 7.64.  Urine culture from 2024 finalized this 25,000 colonies of ESBL E. coli.  Blood cultures from 2024 show no growth thus far.    Review of Systems:    Constitutional: no fever, chills and night sweats.  Generalized fatigue.  Eyes: no eye drainage, itching or redness.  HEENT: no mouth sores, dysphagia or nose bleed.  Respiratory: no for shortness of breath, cough or production of sputum.  Cardiovascular: no chest pain, no palpitations, no orthopnea.  Gastrointestinal: no nausea, vomiting or diarrhea. No abdominal pain, hematemesis or rectal bleeding.  Genitourinary: no dysuria or polyuria.  Hematologic/lymphatic: no lymph node abnormalities, no easy bruising or easy bleeding.  Musculoskeletal: no muscle or joint pain.  Skin: No rash and no itching.  Neurological: no loss of consciousness, no seizure, no headache.  Behavioral/Psych: no depression or suicidal ideation.  Endocrine: no hot flashes.  Immunologic: negative.    ----------------------------------------------------------------------------------------------------------------------      Objective       Current [facility] Meds:  aspirin, 81 mg, Oral, Daily  budesonide-formoterol, 2 puff, Inhalation, BID  carvedilol, 3.125 mg,  Oral, BID With Meals  cetirizine, 10 mg, Oral, Daily  cholecalciferol, 1,000 Units, Oral, Daily  enoxaparin, 40 mg, Subcutaneous, Nightly  ertapenem, 1,000 mg, Intravenous, Q24H  finasteride, 5 mg, Oral, Daily  insulin glargine, 5 Units, Subcutaneous, Nightly  insulin lispro, 2-9 Units, Subcutaneous, 4x Daily AC & at Bedtime  latanoprost, 1 drop, Both Eyes, Daily  lisinopril, 20 mg, Oral, Q24H  lubiprostone, 8 mcg, Oral, BID  montelukast, 10 mg, Oral, Nightly  mupirocin, 1 application , Each Nare, BID  oxybutynin XL, 5 mg, Oral, Daily  pantoprazole, 40 mg, Oral, Q AM  rosuvastatin, 10 mg, Oral, Daily  sodium chloride, 10 mL, Intravenous, Q12H  sodium chloride, 10 mL, Intravenous, Q12H  tamsulosin, 0.4 mg, Oral, Daily           ----------------------------------------------------------------------------------------------------------------------    Vital Signs:  Temp:  [97.7 °F (36.5 °C)-98.7 °F (37.1 °C)] 97.7 °F (36.5 °C)  Heart Rate:  [63-77] 64  Resp:  [18] 18  BP: (155-180)/(67-83) 180/77  No data found.    SpO2 Percentage    04/30/24 1917 04/30/24 2255 05/01/24 0300   SpO2: 97% 98% 99%     SpO2:  [97 %-99 %] 99 %  on  Flow (L/min):  [2] 2;   Device (Oxygen Therapy): room air    Body mass index is 24.42 kg/m².  Wt Readings from Last 3 Encounters:   05/01/24 70.7 kg (155 lb 14.4 oz)   04/26/24 71.7 kg (158 lb)   04/19/24 72.1 kg (159 lb)        Intake/Output Summary (Last 24 hours) at 5/1/2024 1102  Last data filed at 5/1/2024 0900  Gross per 24 hour   Intake 720 ml   Output --   Net 720 ml     Diet: Regular/House; Fluid Consistency: Thin (IDDSI 0)  ----------------------------------------------------------------------------------------------------------------------      Physical Exam:    Constitutional:  Well-developed and well-nourished.  No respiratory distress.  Sitting up in recliner this morning.  Family at bedside.  HENT:  Head: Normocephalic and atraumatic.  Mouth:  Moist mucous membranes.    Eyes:   Conjunctivae and EOM are normal.  No scleral icterus.  Neck:  Neck supple.  No JVD present.    Cardiovascular:  Normal rate, regular rhythm and normal heart sounds with no murmur. No edema.  Pulmonary/Chest:  No respiratory distress, no wheezes, no crackles, with normal breath sounds and good air movement.  Abdominal:  Soft.  Bowel sounds are normal.  No distension and no tenderness.   Musculoskeletal:  No edema, no tenderness, and no deformity.  No swelling or redness of joints.  Neurological:  Alert and oriented to person, place, and time.  No facial droop.  No slurred speech.   Skin:  Skin is warm and dry.  No rash noted.  No pallor.   Psychiatric:  Normal mood and affect.  Behavior is normal.        ----------------------------------------------------------------------------------------------------------------------  Results from last 7 days   Lab Units 04/27/24 1955 04/27/24 1703 04/27/24  1617   HSTROP T ng/L 24* 26* 25*     Results from last 7 days   Lab Units 04/27/24  1703   PROBNP pg/mL 291.2         Results from last 7 days   Lab Units 05/01/24  0227 04/30/24  0024 04/29/24  0258 04/28/24  0203 04/27/24 1955 04/27/24 1703 04/27/24  1617   CRP mg/dL  --   --   --  15.00*  --  5.14*  --    LACTATE mmol/L  --   --   --   --  1.4 2.8*  --    WBC 10*3/mm3 7.64 13.79* 15.01* 15.87*  --   --  15.54*   HEMOGLOBIN g/dL 9.9* 10.1* 10.7* 11.0*  --   --  11.7*   HEMATOCRIT % 32.0* 31.7* 34.1* 34.7*  --   --  35.7*   MCV fL 91.2 88.8 90.2 87.8  --   --  87.9   MCHC g/dL 30.9* 31.9 31.4* 31.7  --   --  32.8   PLATELETS 10*3/mm3 151 141 128* 143  --   --  146   INR   --   --   --   --   --   --  1.00     Results from last 7 days   Lab Units 04/30/24  0024 04/29/24  0258 04/28/24  0203 04/27/24  1703 04/27/24  1618 04/27/24  1617   SODIUM mmol/L 140 140 143  --   --  136   POTASSIUM mmol/L 3.6 3.7 4.1  --   --  4.2   MAGNESIUM mg/dL  --   --   --  1.7  --   --    CHLORIDE mmol/L 105 106 105  --   --  100   CO2 mmol/L  "23.4 22.2 25.7  --   --  22.1   BUN mg/dL 18 17 19  --   --  25*   CREATININE mg/dL 1.24 1.27 1.62*  --    < > 1.57*   CALCIUM mg/dL 8.7 8.5* 9.0  --   --  9.3   GLUCOSE mg/dL 157* 153* 127*  --   --  299*   ALBUMIN g/dL  --   --  3.9  --   --  4.0   BILIRUBIN mg/dL  --   --  0.5  --   --  0.3   ALK PHOS U/L  --   --  82  --   --  85   AST (SGOT) U/L  --   --  19  --   --  22   ALT (SGPT) U/L  --   --  20  --   --  23    < > = values in this interval not displayed.   Estimated Creatinine Clearance: 52.3 mL/min (by C-G formula based on SCr of 1.24 mg/dL).  No results found for: \"AMMONIA\"    Glucose   Date/Time Value Ref Range Status   05/01/2024 1009 225 (H) 70 - 130 mg/dL Final   05/01/2024 0631 141 (H) 70 - 130 mg/dL Final   04/30/2024 1951 261 (H) 70 - 130 mg/dL Final   04/30/2024 1718 174 (H) 70 - 130 mg/dL Final   04/30/2024 1207 157 (H) 70 - 130 mg/dL Final   04/30/2024 0642 132 (H) 70 - 130 mg/dL Final   04/29/2024 2134 166 (H) 70 - 130 mg/dL Final   04/29/2024 1625 203 (H) 70 - 130 mg/dL Final     Lab Results   Component Value Date    HGBA1C 7.10 (H) 04/27/2024     Lab Results   Component Value Date    TSH 0.918 04/27/2024    FREET4 1.37 04/27/2024       Blood Culture   Date Value Ref Range Status   04/27/2024 Escherichia coli (C)  Preliminary   04/27/2024 Escherichia coli (C)  Preliminary     Urine Culture   Date Value Ref Range Status   04/27/2024 Culture in progress  Preliminary     No results found for: \"WOUNDCX\"  No results found for: \"STOOLCX\"  No results found for: \"RESPCX\"  Pain Management Panel           No data to display                  ----------------------------------------------------------------------------------------------------------------------  Imaging Results (Last 24 Hours)       ** No results found for the last 24 hours. **            ----------------------------------------------------------------------------------------------------------------------    Pertinent Infectious Disease " Results                Assessment/Plan       Assessment       ESBL bacteremia  Complicated UTI      Plan        Patient sitting up in recliner this morning.  Overall feels better today.  Family at bedside.  Currently on room air with no apparent distress.  Fever is resolved, denies diarrhea.  Lungs clear to auscultation bilaterally.  Abdomen soft, nontender.  WBC improved, now normalized at 7.64.  Urine culture from 4/27/2024 finalized this 25,000 colonies of ESBL E. coli.  Blood cultures from 4/28/2024 show no growth thus far.    Recommend to continue ertapenem 1 g IV every 24 hours monotherapy for treatment of ESBL bacteremia secondary to urinary source through 5/11/2024 in the setting of recent stent placement.  As there plans to have patient's stent removed on Thursday, 5-24 and patient will still be receiving IV antibiotic therapy there will be no need for oral suppressive therapy upon completion of IV antibiotic therapy.  Patient require outpatient infectious disease follow-up.  We will continue to follow closely and adjust antibiotic therapy as needed.      ANTIMICROBIAL THERAPY    ertapenem (INVanz) 1000 mg in 100 mL NS (VTB)  ERTAPENEM 1000 MG IVPB  ML NS VTB (CD)     Code Status:   Code Status and Medical Interventions:   Ordered at: 04/27/24 2127     Code Status (Patient has no pulse and is not breathing):    CPR (Attempt to Resuscitate)     Medical Interventions (Patient has pulse or is breathing):    Full Support       KENDRA Cantu  05/01/24  11:02 EDT

## 2024-05-01 NOTE — DISCHARGE INSTR - APPOINTMENTS
YOU HAVE A FOLLOW-UP SCHEDULED WITH RADHA BHANDARI ON 5-7-24 AT 10:00. OFFICE CAN BE REACHED [phone]

## 2024-05-01 NOTE — CASE MANAGEMENT/SOCIAL WORK
Continued Stay Note   [place]     Patient Name: Jamin Julio  MRN: [id number]  Today's Date: 5/1/2024    Admit Date: 4/27/2024    Plan: SINDY received order for Home IV antibiotics.  SINDY spoke with patient's wife Alberta who states that her son Chan is going to be getting teaching on assisting with IV admiinistration and he will give her instructions so that she can assist also.  SINDY phoned Zee with [employer] who states that she is in a meeting and will head this way after her meeting is over.  CM faxed and phoned [employer] and spoke with [employer] who states they will run cost and contact patient.  MD notified to place Home Health order for weekly CPK & CMP.  No other issues or concerns at this time.  Patient is being discharged on this date 5/1/24.   Discharge Plan       Row Name 05/01/24 1001       Plan    Plan CM received order for Home IV antibiotics.  SINDY spoke with patient's wife Alberta who states that her son Chan is going to be getting teaching on assisting with IV admiinistration and he will give her instructions so that she can assist also.  SINDY phoned Zee with [employer] who states that she is in a meeting and will head this way after her meeting is over.  CM faxed and phoned [employer] and spoke with [employer] who states they will run cost and contact patient.  MD notified to place Home Health order for weekly CPK & CMP.  No other issues or concerns at this time.  Patient is being discharged on this date 5/1/24.    Patient/Family in Agreement with Plan yes                   Discharge Codes    No documentation.                 Expected Discharge Date and Time       Expected Discharge Date Expected Discharge Time    May 1, 2024               Beth Gold, LORENA

## 2024-05-01 NOTE — CASE MANAGEMENT/SOCIAL WORK
Continued Stay Note   [place]     Patient Name: Jamin Julio  MRN: [id number]  Today's Date: 5/1/2024    Admit Date: 4/27/2024    Plan: CM received secure message from [employer] with [employer] that patient and on have been education on IV infusion and patient is good with cost of $100.00 per week.   Discharge Plan       Row Name 05/01/24 1354       Plan    Plan CM received secure message from [employer] with [employer] that patient and on have been education on IV infusion and patient is good with cost of $100.00 per week.                  Discharge Codes    No documentation.                 Expected Discharge Date and Time       Expected Discharge Date Expected Discharge Time    May 1, 2024               Beth Gold RN

## 2024-05-01 NOTE — DISCHARGE SUMMARY
[facility] HOSPITALISTS DISCHARGE SUMMARY    Patient Identification:  Name:  Jamin Julio  Age:  73 y.o.  Sex:  male  :  1950  MRN:  [id number]  Visit Number:  [id number]    Date of Admission: 2024  Date of Discharge:      PCP: Christophe Jamil PA-C    DISCHARGE DIAGNOSIS  #ESBL E. coli bacteremia  #Suspect ESBL E. coli UTI (complicated UTI in setting of ureteral stent)  #Acute kidney injury due to obstructing ureteral stone status post stent  #Acute infectious encephalopathy due to 1 and resolved  #Sepsis (severe per CMS) present upon admission and due to #1    CONSULTS   Infectious Disease  Tele-Neurology    PROCEDURES PERFORMED  Echocardiogram:  Interpretation Summary    Left ventricular systolic function is normal. Calculated left ventricular EF = 60.9%    Left ventricular diastolic function is consistent with (grade I) impaired relaxation.    Estimated right ventricular systolic pressure from tricuspid regurgitation is normal (<35 mmHg).    There is a small (<1cm) circumferential pericardial effusion. There is no evidence of cardiac tamponade.    HOSPITAL COURSE  Patient is a 73 y.o. male presented to [facility] complaining of confusion, fever.  Please see the admitting history and physical for further details.      While in the emergency department, the patient was seen by teleneurology given increased confusion.  It was felt that patient's presenting symptoms were more suggestive of acute encephalopathy due to an infectious cause.  Brain imaging was unremarkable for acute intracranial abnormality.    Patient was initially started on empiric antibiotic therapy with vancomycin and Zosyn. 2/ Blood cultures revealing ESBL E. coli and antibiotics changed initially to Merrem per infectious disease recommendations.  Repeat blood cultures were obtained on 2024 and remain with no growth to date.  The patient's meropenem was converted to once daily Invanz for ease of at  home administration.  The patient underwent a midline placement on the day prior to discharge.  Patient's renal function was much improved and his acute kidney injury resolved by the time of discharge.  Plan was for patient to complete a 14-day course of IV antibiotic therapy with antibiotics through 5/11/2024.  Case management was consulted and assisted with in-home intravenous antibiotic therapy.  Patient voiced no other complaints or acute complications during his hospital stay.  On the day of discharge, patient's son present at bedside and states that he or his brother will assist with antibiotic administration at home.  Patient to have follow-up urology appointment on 5/2/2024 for left ureteral stent removal.  Patient will have weekly CK and CMP while on antibiotics and will follow-up with his primary care provider and the infectious disease clinic in approximately 1 week postdischarge. Patient was discharged home in a hemodynamically stable condition.     VITAL SIGNS:  Temp:  [97.7 °F (36.5 °C)-98.7 °F (37.1 °C)] 97.7 °F (36.5 °C)  Heart Rate:  [63-77] 64  Resp:  [18] 18  BP: (155-180)/(67-83) 180/77  SpO2:  [97 %-99 %] 99 %  on  Flow (L/min):  [2] 2;   Device (Oxygen Therapy): room air    Body mass index is 24.42 kg/m².  Wt Readings from Last 3 Encounters:   05/01/24 70.7 kg (155 lb 14.4 oz)   04/26/24 71.7 kg (158 lb)   04/19/24 72.1 kg (159 lb)       PHYSICAL EXAM:  Physical Exam  Constitutional:       General: He is not in acute distress.     Appearance: He is well-developed.   HENT:      Head: Normocephalic and atraumatic.   Eyes:      Conjunctiva/sclera: Conjunctivae normal.   Neck:      Trachea: No tracheal deviation.   Cardiovascular:      Rate and Rhythm: Normal rate and regular rhythm.      Heart sounds: No murmur heard.     No friction rub. No gallop.   Pulmonary:      Effort: No respiratory distress.      Breath sounds: Normal breath sounds. No wheezing or rales.   Abdominal:      General: Bowel  sounds are normal. There is no distension.      Palpations: Abdomen is soft.      Tenderness: There is no abdominal tenderness. There is no guarding.   Skin:     General: Skin is warm and dry.      Findings: No erythema or rash.   Neurological:      Mental Status: He is alert and oriented to person, place, and time.      Cranial Nerves: No cranial nerve deficit.     Present during visit: LORENA Corbett and patient's son     DISCHARGE MEDICATIONS:     Your medication list        START taking these medications        Instructions Last Dose Given Next Dose Due   ertapenem 1,000 mg in sodium chloride 0.9 % 100 mL IVPB  Start taking on: May 2, 2024      Infuse 1,000 mg into a venous catheter Daily for 10 doses. Indications: Bacteria in the Blood, Urinary Tract Infection              CHANGE how you take these medications        Instructions Last Dose Given Next Dose Due   meloxicam 15 MG tablet  Commonly known as: MOBIC  What changed:   when to take this  reasons to take this      Take 1 tablet by mouth Daily As Needed for Moderate Pain.              CONTINUE taking these medications        Instructions Last Dose Given Next Dose Due   albuterol sulfate  (90 Base) MCG/ACT inhaler  Commonly known as: PROVENTIL HFA;VENTOLIN HFA;PROAIR HFA      Inhale 2 puffs Every 4 (Four) Hours As Needed for Shortness of Air.       Apoaequorin 10 MG capsule      Take 1 capsule by mouth Daily.       Aspirin Low Dose 81 MG EC tablet  Generic drug: aspirin      Take 1 tablet by mouth Daily.       budesonide-formoterol 160-4.5 MCG/ACT inhaler  Commonly known as: SYMBICORT      Inhale 2 puffs 2 (Two) Times a Day. Rinse mouth after each use       carvedilol 3.125 MG tablet  Commonly known as: COREG      Take 1 tablet by mouth 2 (Two) Times a Day With Meals.       cholecalciferol 25 MCG (1000 UT) tablet  Commonly known as: VITAMIN D3      Take 1 tablet by mouth Daily.       finasteride 5 MG tablet  Commonly known as: PROSCAR      Take 1 tablet  by mouth Daily.       HYDROcodone-acetaminophen  MG per tablet  Commonly known as: NORCO      Take 1 tablet by mouth Every 6 (Six) Hours As Needed for Moderate Pain (Pain).       Januvia 100 MG tablet  Generic drug: SITagliptin      Take 1 tablet by mouth Daily.       latanoprost 0.005 % ophthalmic solution  Commonly known as: XALATAN      Administer 1 drop to both eyes Daily.       levocetirizine 5 MG tablet  Commonly known as: XYZAL      Take 1 tablet by mouth every night at bedtime.       linaclotide 145 MCG capsule capsule  Commonly known as: LINZESS      Take 1 capsule by mouth Every Morning Before Breakfast.       lisinopril 20 MG tablet  Commonly known as: PRINIVIL,ZESTRIL      Take 1 tablet by mouth every night at bedtime.       Mirabegron ER 25 MG tablet sustained-release 24 hour 24 hr tablet  Commonly known as: MYRBETRIQ      Take 1 tablet by mouth Daily.       montelukast 10 MG tablet  Commonly known as: SINGULAIR      Take 1 tablet by mouth Every Night.       omeprazole 20 MG capsule  Commonly known as: priLOSEC      Take 1 capsule by mouth Daily.       rosuvastatin 10 MG tablet  Commonly known as: CRESTOR      Take 1 tablet by mouth Daily.       tamsulosin 0.4 MG capsule 24 hr capsule  Commonly known as: FLOMAX      Take 1 capsule by mouth Daily.              STOP taking these medications      glimepiride 1 MG tablet  Commonly known as: AMARYL        metFORMIN 500 MG tablet  Commonly known as: GLUCOPHAGE                  Where to Get Your Medications        Information about where to get these medications is not yet available    Ask your nurse or doctor about these medications  ertapenem 1,000 mg in sodium chloride 0.9 % 100 mL IVPB  meloxicam 15 MG tablet       DISCHARGE DISPOSITION   Stable    Diet Instructions       Diet: Regular/House Diet; Thin (IDDSI 0)      Discharge Diet: Regular/House Diet    Fluid Consistency: Thin (IDDSI 0)          Activity Instructions       Up WIth Assist             Future Appointments   Date Time Provider Department Center   5/2/2024  9:40 AM Clayton Gauthier MD [facility]     Your Scheduled Appointments      May 02, 2024  9:40 AM  Post-Op with Clayton Gauthier MD  [facility] GASTROENTEROLOGY & UROLOGY ([facility]) [address]  [place] [address] [phone]             Additional Instructions for the Follow-ups that You Need to Schedule       Ambulatory Referral to Home Health   As directed      Please remove Midline/PICC after last IV antibiotic infusion    Order Comments: Please remove Midline/PICC after last IV antibiotic infusion    Face to Face Visit Date: 5/1/2024   Follow-up provider for Plan of Care?: I treated the patient in an acute care facility and will not continue treatment after discharge.   Follow-up provider: MARIANN JAMIL [[id number]]   Reason/Clinical Findings: bacteremia   Describe mobility limitations that make leaving home difficult: IV antibiotics   Nursing/Therapeutic Services Requested: Skilled Nursing   Skilled nursing orders: PICC line care/instruction   Frequency: 1 Week 1        Discharge Follow-up with PCP   As directed       Currently Documented PCP:    Mariann Jamil PA-C    PCP Phone Number:    [phone]     Follow Up Details: 1 week with  BMP and CBC; hospital follow up for pyelonephritis, bacteremia        Discharge Follow-up with Specified Provider: [facility]; 1 Week   As directed      To: [facility]   Follow Up: 1 Week   Follow Up Details: Hospital follow up bacteremia               Follow-up Information       Megan Townsend MD. Schedule an appointment as soon as possible for a visit in 1 week(s).    Specialty: Infectious Diseases  Contact information:  [address]  [address]  [place] [address]  [phone]               Mariann Jamil PA-C .    Specialties: Family Medicine, Pediatrics  Why: 1 week with  BMP and CBC; hospital follow up for pyelonephritis,  bacteremia  Contact information:  [address] [place] [id number] [phone]                              TEST  RESULTS PENDING AT DISCHARGE  Pending Labs       Order Current Status    Blood Culture - Blood, Arm, Left Preliminary result    Blood Culture - Blood, Arm, Right Preliminary result             CODE STATUS  Code Status and Medical Interventions:   Ordered at: 04/27/24 2127     Code Status (Patient has no pulse and is not breathing):    CPR (Attempt to Resuscitate)     Medical Interventions (Patient has pulse or is breathing):    Full Support       Erika Paredes DO  05/01/24  09:41 EDT    Please note that this discharge summary required more than 30 minutes to complete.    Please send a copy of this dictation to the following providers:  Christophe Jamil PA-C

## 2024-05-01 NOTE — CASE MANAGEMENT/SOCIAL WORK
Discharge Planning Assessment   [place]     Patient Name: Jamin Julio  MRN: [id number]  Today's Date: 5/1/2024    Admit Date: 4/27/2024            Discharge Plan       Row Name 05/01/24 1020       Plan    Patient/Family in Agreement with Plan yes    Provided Post Acute Provider Quality & Resource List? Refused  no preference of HH agency    Final Discharge Disposition Code 06 - home with home health care    Final Note Pt is being discharged home with Physician ordered home health services. Pt and spouse are aware and agreeable to discharge. Pt and spouse do not have preference of HH agency. [phone]. SS to provide Lead RN with report number once [facility] has confirmed acceptance. Pt's family to provide transportation home.    13:17pm: [facility]  [phone] report number provided to Lead RN.   15:09pm: [facility] per [facility]io states they are not in Pt's service area that Pt lives in [facility] not [facility]. SS contacted [facility]  and faxed referral to fax [phone] for review.     16:21pm: [facility] per [facility]us states Pt has been accepted. SS provided Lead RN report number [facility]  [phone]                  JACK Kitchen

## 2024-05-01 NOTE — PLAN OF CARE
Goal Outcome Evaluation:      Pt ambulated to chair from bed and back to bed. Pt received 6 units of coverage B last night, pt stated the increase in sugar was due to having pudding and ice cream after dinner. Pt educated on importance of adhering to diet regimen. Pt is resting in bed at this time with son at bed side, no complaints at this time. Will continue with plan of care.

## 2024-05-02 ENCOUNTER — NURSE TRIAGE (OUTPATIENT)
Dept: CALL CENTER | Facility: HOSPITAL | Age: 74
End: 2024-05-02
Payer: MEDICARE

## 2024-05-02 ENCOUNTER — OFFICE VISIT (OUTPATIENT)
Dept: UROLOGY | Facility: CLINIC | Age: 74
End: 2024-05-02
Payer: MEDICARE

## 2024-05-02 ENCOUNTER — HOSPITAL ENCOUNTER (OUTPATIENT)
Dept: GENERAL RADIOLOGY | Facility: HOSPITAL | Age: 74
Discharge: HOME | End: 2024-05-02
Admitting: UROLOGY
Payer: MEDICARE

## 2024-05-02 VITALS
WEIGHT: 158 LBS | DIASTOLIC BLOOD PRESSURE: 76 MMHG | HEART RATE: 55 BPM | BODY MASS INDEX: 24.8 KG/M2 | SYSTOLIC BLOOD PRESSURE: 174 MMHG | HEIGHT: 67 IN

## 2024-05-02 DIAGNOSIS — N12 PYELONEPHRITIS: ICD-10-CM

## 2024-05-02 DIAGNOSIS — N20.0 NEPHROLITHIASIS: ICD-10-CM

## 2024-05-02 DIAGNOSIS — N20.0 NEPHROLITHIASIS: Primary | ICD-10-CM

## 2024-05-02 PROCEDURE — 74018 RADEX ABDOMEN 1 VIEW: CPT | Performed by: RADIOLOGY

## 2024-05-02 PROCEDURE — 74018 RADEX ABDOMEN 1 VIEW: CPT

## 2024-05-02 NOTE — PROGRESS NOTES
Chief Complaint:      Chief Complaint   Patient presents with    Nephrolithiasis     Post op       HPI:   73 y.o. male turns today status post a left-sided lithotripsy ureteroscopy and stent placement he postop had fever and was admitted for a UTI.  Currently he is doing dramatically better KUB shows a lot of difficulty seeing the stones because of overlying ileus.  I would leave the stent in 1 additional week and plan on removal next week.    Past Medical History:     Past Medical History:   Diagnosis Date    Arthritis     Asthma     Black lung disease     Diabetes mellitus     Elevated cholesterol     GERD (gastroesophageal reflux disease)     Hypertension     Kidney stone     Sleep apnea     no c-pap       Current Meds:     Current Outpatient Medications   Medication Sig Dispense Refill    albuterol sulfate  (90 Base) MCG/ACT inhaler Inhale 2 puffs Every 4 (Four) Hours As Needed for Shortness of Air.      Apoaequorin 10 MG capsule Take 1 capsule by mouth Daily.      Aspirin Low Dose 81 MG EC tablet Take 1 tablet by mouth Daily.      budesonide-formoterol (SYMBICORT) 160-4.5 MCG/ACT inhaler Inhale 2 puffs 2 (Two) Times a Day. Rinse mouth after each use      carvedilol (COREG) 3.125 MG tablet Take 1 tablet by mouth 2 (Two) Times a Day With Meals.      Cholecalciferol 25 MCG (1000 UT) tablet Take 1 tablet by mouth Daily.      ertapenem 1,000 mg in sodium chloride 0.9 % 100 mL IVPB Infuse 1,000 mg into a venous catheter Daily for 10 doses. Indications: Bacteria in the Blood, Urinary Tract Infection      finasteride (PROSCAR) 5 MG tablet Take 1 tablet by mouth Daily.      HYDROcodone-acetaminophen (NORCO)  MG per tablet Take 1 tablet by mouth Every 6 (Six) Hours As Needed for Moderate Pain (Pain). 20 tablet 0    Januvia 100 MG tablet Take 1 tablet by mouth Daily.      latanoprost (XALATAN) 0.005 % ophthalmic solution Administer 1 drop to both eyes Daily.      levocetirizine (XYZAL) 5 MG tablet Take 1  Myself tablet by mouth every night at bedtime.      linaclotide (LINZESS) 145 MCG capsule capsule Take 1 capsule by mouth Every Morning Before Breakfast. 30 capsule 2    lisinopril (PRINIVIL,ZESTRIL) 20 MG tablet Take 1 tablet by mouth every night at bedtime.      meloxicam (MOBIC) 15 MG tablet Take 1 tablet by mouth Daily As Needed for Moderate Pain.      Mirabegron ER (MYRBETRIQ) 25 MG tablet sustained-release 24 hour 24 hr tablet Take 1 tablet by mouth Daily. 30 tablet 2    montelukast (SINGULAIR) 10 MG tablet Take 1 tablet by mouth Every Night.      omeprazole (priLOSEC) 20 MG capsule Take 1 capsule by mouth Daily.      rosuvastatin (CRESTOR) 10 MG tablet Take 1 tablet by mouth Daily.      tamsulosin (FLOMAX) 0.4 MG capsule 24 hr capsule Take 1 capsule by mouth Daily. 90 capsule 3     No current facility-administered medications for this visit.        Allergies:      No Known Allergies     Past Surgical History:     Past Surgical History:   Procedure Laterality Date    CATARACT EXTRACTION WITH INTRAOCULAR LENS IMPLANT Bilateral     COLONOSCOPY      CYST REMOVAL Right     hip    CYSTOSCOPY LITHOLAPAXY BLADDER STONE EXTRACTION N/A 4/26/2024    Procedure: CYSTOSCOPY LITHOLAPAXY BLADDER STONE EXTRACTION;  Surgeon: Clayton Gauthier MD;  Location: [facility];  Service: Urology;  Laterality: N/A;    CYSTOSCOPY URETEROSCOPY LASER LITHOTRIPSY Left 4/26/2024    Procedure: LEFT URETEROSCOPY LASER LITHOTRIPSY;  Surgeon: Clayton Gauthier MD;  Location: [facility];  Service: Urology;  Laterality: Left;    EXTRACORPOREAL SHOCK WAVE LITHOTRIPSY (ESWL) Left 4/26/2024    Procedure: LEFT ESWL;  Surgeon: Clayton Gauthier MD;  Location: [facility];  Service: Urology;  Laterality: Left;       Social History:     Social History     Socioeconomic History    Marital status:    Tobacco Use    Smoking status: Never     Passive exposure: Never    Smokeless tobacco: Current     Types: Snuff   Vaping Use    Vaping status:  Never Used   Substance and Sexual Activity    Alcohol use: Never    Drug use: Never    Sexual activity: Defer       Family History:     Family History   Problem Relation Age of Onset    Prostate cancer Father        Review of Systems:     Review of Systems   Constitutional: Negative.    HENT: Negative.     Eyes: Negative.    Respiratory: Negative.     Cardiovascular: Negative.    Gastrointestinal: Negative.    Endocrine: Negative.    Musculoskeletal: Negative.    Allergic/Immunologic: Negative.    Neurological: Negative.    Hematological: Negative.    Psychiatric/Behavioral: Negative.         Physical Exam:     Physical Exam  Vitals and nursing note reviewed.   Constitutional:       Appearance: He is well-developed.   HENT:      Head: Normocephalic and atraumatic.   Eyes:      Conjunctiva/sclera: Conjunctivae normal.      Pupils: Pupils are equal, round, and reactive to light.   Cardiovascular:      Rate and Rhythm: Normal rate and regular rhythm.      Heart sounds: Normal heart sounds.   Pulmonary:      Effort: Pulmonary effort is normal.      Breath sounds: Normal breath sounds.   Abdominal:      General: Bowel sounds are normal.      Palpations: Abdomen is soft.   Musculoskeletal:         General: Normal range of motion.      Cervical back: Normal range of motion.   Skin:     General: Skin is warm and dry.   Neurological:      Mental Status: He is alert and oriented to person, place, and time.      Deep Tendon Reflexes: Reflexes are normal and symmetric.   Psychiatric:         Behavior: Behavior normal.         Thought Content: Thought content normal.         Judgment: Judgment normal.         I have reviewed the following portions of the patient's history: Allergies, current medications, past family history, past medical history, past social history, past surgical history, problem list, and ROS and confirm it is accurate.    Recent Image (CT and/or KUB):      CT Abdomen and Pelvis: No results found for this or  any previous visit.       CT Stone Protocol: Results for orders placed during the hospital encounter of 04/26/24    CT Abdomen Pelvis Stone Protocol    Narrative  EXAM:  CT Abdomen and Pelvis Without Intravenous Contrast    EXAM DATE:  4/26/2024 8:44 AM    CLINICAL HISTORY:  Nephrolithiasis    TECHNIQUE:  Axial computed tomography images of the abdomen and pelvis without  intravenous contrast.  Sagittal and coronal reformatted images were  created and reviewed.  This CT exam was performed using one or more of  the following dose reduction techniques:  automated exposure control,  adjustment of the mA and/or kV according to patient size, and/or use of  iterative reconstruction technique.    COMPARISON:  2/21/2023    FINDINGS:  Lung bases:  Lung bases are clear.  No consolidation.  Heart:  Small pericardial effusion.    ABDOMEN:  Liver:  Unremarkable as visualized.  Gallbladder and bile ducts:  Markedly distended gallbladder with  multiple layering gallstones. No wall thickening or pericholecystic  fluid.  No ductal dilation.  Pancreas:  Unremarkable as visualized.  No ductal dilation.  Spleen:  Unremarkable as visualized.  No splenomegaly.  Adrenals:  Unremarkable as visualized.  No mass.  Kidneys and ureters:  Marked left hydronephrosis is noted to the level  of the distal left ureter UVJ region where there are multiple layering  stones, largest of which is 8.4 mm. Large left UVJ stone that is almost  within the urinary bladder is approximately 6.6 mm.  Nonobstructing  bilateral kidney stones are noted.  Stomach and bowel:  Sigmoid diverticulosis without diverticulitis.  No  obstruction.    PELVIS:  Appendix:  No findings to suggest acute appendicitis.  Bladder:  Incomplete distention of urinary bladder with mild wall  thickening.  Layering stone in the dependent portion of the urinary  bladder is approximately 10.7 mm.  Reproductive:  Prostate large with central calcifications.    ABDOMEN and  PELVIS:  Intraperitoneal space:  Unremarkable as visualized.  No free air.  No  significant fluid collection.  Bones/joints:  No acute fracture.  No dislocation.  Soft tissues:  Small fat only containing umbilical hernia.  Vasculature:  Unremarkable as visualized.  No abdominal aortic  aneurysm.  Lymph nodes:  Unremarkable as visualized.  No enlarged lymph nodes.    Impression  1.  Marked left-sided hydronephrosis and ureteral dilatation secondary  to multiple obstructing distal left ureteral stones and UVJ stone with  stone measurements detailed above.  2.  Urinary bladder stone.  3.  Bilateral nonobstructing kidney stones.  4.  Distended gallbladder with multiple layering gallstones. No wall  thickening identified.  5.  Prostate enlargement and central calcifications.  6.  Diverticulosis without diverticulitis.  7.  Other incidental/nonacute findings above.      This report was finalized on 4/26/2024 9:06 AM by Dr. Eric Stokes MD.       KUB: Results for orders placed during the hospital encounter of 04/19/24    XR Abdomen KUB    Narrative  EXAM:  XR Abdomen, 1 View    EXAM DATE:  4/19/2024 11:31 AM    CLINICAL HISTORY:  KIDNEY STONE; N20.0-Calculus of kidney    TECHNIQUE:  Frontal supine view of the abdomen/pelvis.    COMPARISON:  3/9/2023    FINDINGS:  INTRAPERITONEAL SPACE:  Again calcifications are noted in the pelvis.  GASTROINTESTINAL TRACT:  Moderate colonic stool.  No dilation.  ORGANS:  Bilateral renal calcifications are again noted.  BONES/JOINTS:  Unremarkable as visualized.  No acute fracture.    Impression  1.  Moderate colonic stool.  2.  Bilateral renal calcifications are again noted.      This report was finalized on 4/19/2024 12:21 PM by Dr. Fernando Wise MD.       Labs (past 3 months):      No results displayed because visit has over 200 results.      Admission on 04/26/2024, Discharged on 04/26/2024   Component Date Value Ref Range Status    Glucose 04/26/2024 151 (H)  70 - 130 mg/dL Final    Pre-Admission Testing on 04/24/2024   Component Date Value Ref Range Status    QT Interval 04/24/2024 396  ms Final    QTC Interval 04/24/2024 418  ms Final   Results Encounter on 04/19/2024   Component Date Value Ref Range Status    Glucose 04/24/2024 143 (H)  65 - 99 mg/dL Final    BUN 04/24/2024 24 (H)  8 - 23 mg/dL Final    Creatinine 04/24/2024 1.77 (H)  0.76 - 1.27 mg/dL Final    Sodium 04/24/2024 141  136 - 145 mmol/L Final    Potassium 04/24/2024 4.6  3.5 - 5.2 mmol/L Final    Chloride 04/24/2024 104  98 - 107 mmol/L Final    CO2 04/24/2024 28.1  22.0 - 29.0 mmol/L Final    Calcium 04/24/2024 9.7  8.6 - 10.5 mg/dL Final    BUN/Creatinine Ratio 04/24/2024 13.6  7.0 - 25.0 Final    Anion Gap 04/24/2024 8.9  5.0 - 15.0 mmol/L Final    eGFR 04/24/2024 39.8 (L)  >60.0 mL/min/1.73 Final    WBC 04/24/2024 8.92  3.40 - 10.80 10*3/mm3 Final    RBC 04/24/2024 3.82 (L)  4.14 - 5.80 10*6/mm3 Final    Hemoglobin 04/24/2024 11.0 (L)  13.0 - 17.7 g/dL Final    Hematocrit 04/24/2024 34.2 (L)  37.5 - 51.0 % Final    MCV 04/24/2024 89.5  79.0 - 97.0 fL Final    MCH 04/24/2024 28.8  26.6 - 33.0 pg Final    MCHC 04/24/2024 32.2  31.5 - 35.7 g/dL Final    RDW 04/24/2024 13.8  12.3 - 15.4 % Final    RDW-SD 04/24/2024 45.1  37.0 - 54.0 fl Final    MPV 04/24/2024 12.1 (H)  6.0 - 12.0 fL Final    Platelets 04/24/2024 168  140 - 450 10*3/mm3 Final        Procedure:       Assessment/Plan:   Renal calculus-we discussed the presence of the stone. We discussed the various therapeutic options available including percutaneous nephrostolithotomy, ureteroscopy and extracorporeal shockwave  lithotripsy.  We discussed the risks of lithotripsy including the passage of stones, the development of a large string of stones in the distal ureter known as Steinstrasse.  In the 3% incidence of that we will need to proceed with a ureteroscopy for obstructing fragments.  Extremely rare incidence of renal hematoma and the significance of this.  We  discussed percutaneous nephrostolithotomy and its use as well as the risks and benefits such as the need for postoperative hospitalization, and the risk of damage to the kidney and the remote risk of a nephrectomy.  We also discussed the use of ureteroscopy in the upper tracts.  That this is as a decreased success rate to completely remove the stones on the first option and that very likely a stent will be required requiring an additional procedure for removal.  We discussed the absolute relative indicators for intervention including the presence of sepsis and pain we cannot control ais the primary need for urgent intervention.  We discussed placement of a stent if indicated and the management of the stent as well.  As postop urosepsis can leave the stent in 1 additional week          This document has been electronically signed by PORSHA CABAN MD May 2, 2024 10:31 EDT    Dictated Utilizing Dragon Dictation: Part of this note may be an electronic transcription/translation of spoken language to printed text using the Dragon Dictation System.

## 2024-05-02 NOTE — TELEPHONE ENCOUNTER
Caller/Son requesting info about when HH agency would be in contact or arrive to adm. IV antibiotic to Pt. [facility] and per [facility]us they will contact caller regarding visit. Alexus also stated that they had spoken with patient directly this AM. Per caller Pt. Has issues with hearing and understanding information. Caller contacted and notified of plan and verbalizes understanding. Guidelines and protocols reviewed.       Reason for Disposition   Caller has medicine question only, adult not sick, AND triager answers question    Additional Information   Negative: [1] Intentional drug overdose AND [2] suicidal thoughts or ideas   Negative: Drug overdose and triager unable to answer question   Negative: Caller requesting a renewal or refill of a medicine patient is currently taking   Negative: Caller requesting information unrelated to medicine   Negative: Caller requesting information about COVID-19 Vaccine   Negative: Caller requesting information about Emergency Contraception   Negative: Caller requesting information about Combined Birth Control Pills   Negative: Caller requesting information about Progestin Birth Control Pills   Negative: Caller requesting information about Post-Op pain or medicines   Negative: Caller requesting a prescription antibiotic (such as Penicillin) for Strep throat and has a positive culture result   Negative: Caller requesting a prescription anti-viral med (such as Tamiflu) and has influenza (flu) symptoms   Negative: Immunization reaction suspected   Negative: Rash while taking a medicine or within 3 days of stopping it   Negative: [1] Asthma and [2] having symptoms of asthma (cough, wheezing, etc.)   Negative: [1] Symptom of illness (e.g., headache, abdominal pain, earache, vomiting) AND [2] more than mild   Negative: Breastfeeding questions about mother's medicines and diet   Negative: MORE THAN A DOUBLE DOSE of a prescription or over-the-counter (OTC) drug    Negative: [1] DOUBLE DOSE (an extra dose or lesser amount) of prescription drug AND [2] any symptoms (e.g., dizziness, nausea, pain, sleepiness)   Negative: [1] DOUBLE DOSE (an extra dose or lesser amount) of over-the-counter (OTC) drug AND [2] any symptoms (e.g., dizziness, nausea, pain, sleepiness)   Negative: Took another person's prescription drug   Negative: [1] DOUBLE DOSE (an extra dose or lesser amount) of prescription drug AND [2] NO symptoms  (Exception: A double dose of antibiotics.)   Negative: Diabetes drug error or overdose (e.g., took wrong type of insulin or took extra dose)   Negative: [1] Prescription not at pharmacy AND [2] was prescribed by PCP recently (Exception: Triager has access to EMR and prescription is recorded there. Go to [facility] and confirm for pharmacy.)   Negative: [1] Pharmacy calling with prescription question AND [2] triager unable to answer question   Negative: [1] Caller has URGENT medicine question about med that PCP or specialist prescribed AND [2] triager unable to answer question   Negative: Medicine patch causing local rash or itching   Negative: [1] Caller has medicine question about med NOT prescribed by PCP AND [2] triager unable to answer question (e.g., compatibility with other med, storage)   Negative: Prescription request for new medicine (not a refill)   Negative: [1] Caller has NON-URGENT medicine question about med that PCP prescribed AND [2] triager unable to answer question   Negative: Caller wants to use a complementary or alternative medicine   Negative: [1] Prescription prescribed recently is not at pharmacy AND [2] triager has access to patient's EMR AND [3] prescription is recorded in the EMR   Negative: [1] DOUBLE DOSE (an extra dose or lesser amount) of over-the-counter (OTC) drug AND [2] NO symptoms   Negative: [1] DOUBLE DOSE (an extra dose or lesser amount) of antibiotic drug AND [2] NO symptoms    Protocols used: Medication Question  Call-ADULT-AH

## 2024-05-02 NOTE — PAYOR COMM NOTE
"CONTACT:  KELLY WASHINGTON RN  UTILIZATION MANAGEMENT DEPT.   [facility]   [address] [facility], [address]   PHONE:  [phone]   FAX: [phone]         [facility] 5/1/2024        REF # [id number]        Jamin Schafer (73 y.o. Male)       Date of Birth   1950    Social Security Number       Address   [address] [place] [id number]    Home Phone   [phone]    MRN   [id number]       [facility]    Marital Status                               Admission Date   4/27/24    Admission Type   Emergency    Admitting Provider   Jamin Soto MD    Attending Provider       Department, Room/Bed   [address], 3349/1S       Discharge Date   5/1/2024    Discharge Disposition   Home-Health Care c    Discharge Destination   Other                              Attending Provider: (none)   Allergies: No Known Allergies    Isolation: None   Infection: ESBL (04/28/24), ESBL E coli (04/30/24)   Code Status: Prior    Ht: 170.2 cm (67\")   Wt: 70.7 kg (155 lb 14.4 oz)    Admission Cmt: None   Principal Problem: Pyelonephritis [N12]                   Active Insurance as of 4/27/2024       Primary Coverage       Payor Plan Insurance Group Employer/Plan Group    HUMANA MEDICARE REPLACEMENT HUMANA MED ADV PPO [id number]       Payor Plan Address Payor Plan Phone Number Payor Plan Fax Number Effective Dates    [address] [address] [phone]  1/1/2023 - None Entered    Formerly [facility] [id number]         Subscriber Name Subscriber Birth Date Member ID       JAMIN SCHAFER 1950 [id number]                     Emergency Contacts        (Rel.) Home Phone Work Phone Mobile Phone    Latasha Schafer (Spouse) [phone] -- [phone]    Chan Schafer (Son) -- -- [phone]    Viky Carpenter (Daughter) -- -- [phone]    Shekhar Schafer (Son) [phone] -- [phone]                 Discharge Summary        Erika Paredes DO at 05/01/24 0941        "       [facility] HOSPITALISTS DISCHARGE SUMMARY    Patient Identification:  Name:  Jamin Julio  Age:  73 y.o.  Sex:  male  :  1950  MRN:  [id number]  Visit Number:  [id number]    Date of Admission: 2024  Date of Discharge:      PCP: Christophe Jamil PA-C    DISCHARGE DIAGNOSIS  #ESBL E. coli bacteremia  #Suspect ESBL E. coli UTI (complicated UTI in setting of ureteral stent)  #Acute kidney injury due to obstructing ureteral stone status post stent  #Acute infectious encephalopathy due to 1 and resolved  #Sepsis (severe per CMS) present upon admission and due to #1    CONSULTS   Infectious Disease  Tele-Neurology    PROCEDURES PERFORMED  Echocardiogram:  Interpretation Summary    Left ventricular systolic function is normal. Calculated left ventricular EF = 60.9%    Left ventricular diastolic function is consistent with (grade I) impaired relaxation.    Estimated right ventricular systolic pressure from tricuspid regurgitation is normal (<35 mmHg).    There is a small (<1cm) circumferential pericardial effusion. There is no evidence of cardiac tamponade.    HOSPITAL COURSE  Patient is a 73 y.o. male presented to [facility] complaining of confusion, fever.  Please see the admitting history and physical for further details.      While in the emergency department, the patient was seen by teleneurology given increased confusion.  It was felt that patient's presenting symptoms were more suggestive of acute encephalopathy due to an infectious cause.  Brain imaging was unremarkable for acute intracranial abnormality.    Patient was initially started on empiric antibiotic therapy with vancomycin and Zosyn. 2/ Blood cultures revealing ESBL E. coli and antibiotics changed initially to Merrem per infectious disease recommendations.  Repeat blood cultures were obtained on 2024 and remain with no growth to date.  The patient's meropenem was converted to once daily Invanz for ease of  at home administration.  The patient underwent a midline placement on the day prior to discharge.  Patient's renal function was much improved and his acute kidney injury resolved by the time of discharge.  Plan was for patient to complete a 14-day course of IV antibiotic therapy with antibiotics through 5/11/2024.  Case management was consulted and assisted with in-home intravenous antibiotic therapy.  Patient voiced no other complaints or acute complications during his hospital stay.  On the day of discharge, patient's son present at bedside and states that he or his brother will assist with antibiotic administration at home.  Patient to have follow-up urology appointment on 5/2/2024 for left ureteral stent removal.  Patient will have weekly CK and CMP while on antibiotics and will follow-up with his primary care provider and the infectious disease clinic in approximately 1 week postdischarge. Patient was discharged home in a hemodynamically stable condition.     VITAL SIGNS:  Temp:  [97.7 °F (36.5 °C)-98.7 °F (37.1 °C)] 97.7 °F (36.5 °C)  Heart Rate:  [63-77] 64  Resp:  [18] 18  BP: (155-180)/(67-83) 180/77  SpO2:  [97 %-99 %] 99 %  on  Flow (L/min):  [2] 2;   Device (Oxygen Therapy): room air    Body mass index is 24.42 kg/m².  Wt Readings from Last 3 Encounters:   05/01/24 70.7 kg (155 lb 14.4 oz)   04/26/24 71.7 kg (158 lb)   04/19/24 72.1 kg (159 lb)       PHYSICAL EXAM:  Physical Exam  Constitutional:       General: He is not in acute distress.     Appearance: He is well-developed.   HENT:      Head: Normocephalic and atraumatic.   Eyes:      Conjunctiva/sclera: Conjunctivae normal.   Neck:      Trachea: No tracheal deviation.   Cardiovascular:      Rate and Rhythm: Normal rate and regular rhythm.      Heart sounds: No murmur heard.     No friction rub. No gallop.   Pulmonary:      Effort: No respiratory distress.      Breath sounds: Normal breath sounds. No wheezing or rales.   Abdominal:      General: Bowel  sounds are normal. There is no distension.      Palpations: Abdomen is soft.      Tenderness: There is no abdominal tenderness. There is no guarding.   Skin:     General: Skin is warm and dry.      Findings: No erythema or rash.   Neurological:      Mental Status: He is alert and oriented to person, place, and time.      Cranial Nerves: No cranial nerve deficit.     Present during visit: LORENA Corbett and patient's son     DISCHARGE MEDICATIONS:     Your medication list        START taking these medications        Instructions Last Dose Given Next Dose Due   ertapenem 1,000 mg in sodium chloride 0.9 % 100 mL IVPB  Start taking on: May 2, 2024      Infuse 1,000 mg into a venous catheter Daily for 10 doses. Indications: Bacteria in the Blood, Urinary Tract Infection              CHANGE how you take these medications        Instructions Last Dose Given Next Dose Due   meloxicam 15 MG tablet  Commonly known as: MOBIC  What changed:   when to take this  reasons to take this      Take 1 tablet by mouth Daily As Needed for Moderate Pain.              CONTINUE taking these medications        Instructions Last Dose Given Next Dose Due   albuterol sulfate  (90 Base) MCG/ACT inhaler  Commonly known as: PROVENTIL HFA;VENTOLIN HFA;PROAIR HFA      Inhale 2 puffs Every 4 (Four) Hours As Needed for Shortness of Air.       Apoaequorin 10 MG capsule      Take 1 capsule by mouth Daily.       Aspirin Low Dose 81 MG EC tablet  Generic drug: aspirin      Take 1 tablet by mouth Daily.       budesonide-formoterol 160-4.5 MCG/ACT inhaler  Commonly known as: SYMBICORT      Inhale 2 puffs 2 (Two) Times a Day. Rinse mouth after each use       carvedilol 3.125 MG tablet  Commonly known as: COREG      Take 1 tablet by mouth 2 (Two) Times a Day With Meals.       cholecalciferol 25 MCG (1000 UT) tablet  Commonly known as: VITAMIN D3      Take 1 tablet by mouth Daily.       finasteride 5 MG tablet  Commonly known as: PROSCAR      Take 1 tablet  by mouth Daily.       HYDROcodone-acetaminophen  MG per tablet  Commonly known as: NORCO      Take 1 tablet by mouth Every 6 (Six) Hours As Needed for Moderate Pain (Pain).       Januvia 100 MG tablet  Generic drug: SITagliptin      Take 1 tablet by mouth Daily.       latanoprost 0.005 % ophthalmic solution  Commonly known as: XALATAN      Administer 1 drop to both eyes Daily.       levocetirizine 5 MG tablet  Commonly known as: XYZAL      Take 1 tablet by mouth every night at bedtime.       linaclotide 145 MCG capsule capsule  Commonly known as: LINZESS      Take 1 capsule by mouth Every Morning Before Breakfast.       lisinopril 20 MG tablet  Commonly known as: PRINIVIL,ZESTRIL      Take 1 tablet by mouth every night at bedtime.       Mirabegron ER 25 MG tablet sustained-release 24 hour 24 hr tablet  Commonly known as: MYRBETRIQ      Take 1 tablet by mouth Daily.       montelukast 10 MG tablet  Commonly known as: SINGULAIR      Take 1 tablet by mouth Every Night.       omeprazole 20 MG capsule  Commonly known as: priLOSEC      Take 1 capsule by mouth Daily.       rosuvastatin 10 MG tablet  Commonly known as: CRESTOR      Take 1 tablet by mouth Daily.       tamsulosin 0.4 MG capsule 24 hr capsule  Commonly known as: FLOMAX      Take 1 capsule by mouth Daily.              STOP taking these medications      glimepiride 1 MG tablet  Commonly known as: AMARYL        metFORMIN 500 MG tablet  Commonly known as: GLUCOPHAGE                  Where to Get Your Medications        Information about where to get these medications is not yet available    Ask your nurse or doctor about these medications  ertapenem 1,000 mg in sodium chloride 0.9 % 100 mL IVPB  meloxicam 15 MG tablet       DISCHARGE DISPOSITION   Stable    Diet Instructions       Diet: Regular/House Diet; Thin (IDDSI 0)      Discharge Diet: Regular/House Diet    Fluid Consistency: Thin (IDDSI 0)          Activity Instructions       Up WIth Assist             Future Appointments   Date Time Provider Department Center   5/2/2024  9:40 AM Clayton Gauthier MD [facility]     Your Scheduled Appointments      May 02, 2024  9:40 AM  Post-Op with Clayton Gauthire MD  [facility] GASTROENTEROLOGY & [facility]OLOGY ([facility]) [address]  [place] [address] [phone]             Additional Instructions for the Follow-ups that You Need to Schedule       Ambulatory Referral to Home Health   As directed      Please remove Midline/PICC after last IV antibiotic infusion    Order Comments: Please remove Midline/PICC after last IV antibiotic infusion    Face to Face Visit Date: 5/1/2024   Follow-up provider for Plan of Care?: I treated the patient in an acute care facility and will not continue treatment after discharge.   Follow-up provider: MARIANN JAMLI [[id number]]   Reason/Clinical Findings: bacteremia   Describe mobility limitations that make leaving home difficult: IV antibiotics   Nursing/Therapeutic Services Requested: Skilled Nursing   Skilled nursing orders: PICC line care/instruction   Frequency: 1 Week 1        Discharge Follow-up with PCP   As directed       Currently Documented PCP:    Mariann Jamil PA-C    PCP Phone Number:    [phone]     Follow Up Details: 1 week with  BMP and CBC; hospital follow up for pyelonephritis, bacteremia        Discharge Follow-up with Specified Provider: [facility]; 1 Week   As directed      To: [facility]   Follow Up: 1 Week   Follow Up Details: Hospital follow up bacteremia               Follow-up Information       Megan Townsend MD. Schedule an appointment as soon as possible for a visit in 1 week(s).    Specialty: Infectious Diseases  Contact information:  [address]  [address]  [place] [address]  [phone]               Mariann Jamil PA-C .    Specialties: Family Medicine, Pediatrics  Why: 1 week with  BMP and CBC; hospital follow up for pyelonephritis,  bacteremia  Contact information:  [address] [place] [id number] [phone]                              TEST  RESULTS PENDING AT DISCHARGE  Pending Labs       Order Current Status    Blood Culture - Blood, Arm, Left Preliminary result    Blood Culture - Blood, Arm, Right Preliminary result             CODE STATUS  Code Status and Medical Interventions:   Ordered at: 04/27/24 2127     Code Status (Patient has no pulse and is not breathing):    CPR (Attempt to Resuscitate)     Medical Interventions (Patient has pulse or is breathing):    Full Support       Erika Paredes DO  05/01/24  09:41 EDT    Please note that this discharge summary required more than 30 minutes to complete.    Please send a copy of this dictation to the following providers:  Christophe Jamil PA-C    Electronically signed by Erika Paredes DO at 05/01/24 1919       Discharge Order (From admission, onward)       Start     Ordered    05/01/24 0932  Discharge patient  Once        Expected Discharge Date: 05/01/24   Discharge Disposition: Home-Health Care [facility]   Physician of Record for Attribution - Please select from Treatment Team: AEB THURSTON [[id number]]   Review needed by CMO to determine Physician of Record: No      Question Answer Comment   Physician of Record for Attribution - Please select from Treatment Team ABE THURSTON    Review needed by CMO to determine Physician of Record No        05/01/24 0946

## 2024-05-02 NOTE — OUTREACH NOTE
Prep Survey      Flowsheet Row Responses   [facility] facility patient discharged from? [place]   Is LACE score < 7 ? No   Eligibility Readm Mgmt   Discharge diagnosis Pyelonephritis   Does the patient have one of the following disease processes/diagnoses(primary or secondary)? Other   Does the patient have Home health ordered? Yes   What is the Home health agency?  [facility]   Is there a DME ordered? No   Prep survey completed? Yes            Monique COOPER - Registered Nurse

## 2024-05-03 LAB
BACTERIA SPEC AEROBE CULT: NORMAL
BACTERIA SPEC AEROBE CULT: NORMAL

## 2024-05-06 LAB
CALCIUM OXALATE DIHYDRATE MFR STONE IR: 20 %
CALCIUM OXALATE DIHYDRATE MFR STONE IR: 20 %
COLOR STONE: NORMAL
COLOR STONE: NORMAL
COM MFR STONE: 80 %
COM MFR STONE: 80 %
COMPN STONE: NORMAL
COMPN STONE: NORMAL
LABORATORY COMMENT REPORT: NORMAL
Lab: NORMAL
PHOTO: NORMAL
PHOTO: NORMAL
SIZE STONE: NORMAL MM
SIZE STONE: NORMAL MM
SPEC SOURCE SUBJ: NORMAL
SPEC SOURCE SUBJ: NORMAL
WT STONE: 481 MG
WT STONE: 86 MG

## 2024-05-08 ENCOUNTER — NURSE TRIAGE (OUTPATIENT)
Dept: CALL CENTER | Facility: HOSPITAL | Age: 74
End: 2024-05-08
Payer: MEDICARE

## 2024-05-09 ENCOUNTER — OFFICE VISIT (OUTPATIENT)
Dept: INFECTIOUS DISEASES | Facility: CLINIC | Age: 74
End: 2024-05-09
Payer: MEDICARE

## 2024-05-09 ENCOUNTER — OFFICE VISIT (OUTPATIENT)
Dept: UROLOGY | Facility: CLINIC | Age: 74
End: 2024-05-09
Payer: MEDICARE

## 2024-05-09 ENCOUNTER — LAB REQUISITION (OUTPATIENT)
Dept: LAB | Facility: HOSPITAL | Age: 74
End: 2024-05-09
Payer: MEDICARE

## 2024-05-09 ENCOUNTER — HOSPITAL ENCOUNTER (OUTPATIENT)
Dept: GENERAL RADIOLOGY | Facility: HOSPITAL | Age: 74
Discharge: HOME | End: 2024-05-09
Admitting: UROLOGY
Payer: MEDICARE

## 2024-05-09 VITALS
DIASTOLIC BLOOD PRESSURE: 65 MMHG | BODY MASS INDEX: 24.75 KG/M2 | OXYGEN SATURATION: 98 % | TEMPERATURE: 96.8 F | HEART RATE: 57 BPM | WEIGHT: 154 LBS | SYSTOLIC BLOOD PRESSURE: 132 MMHG | HEIGHT: 66 IN

## 2024-05-09 VITALS
SYSTOLIC BLOOD PRESSURE: 142 MMHG | BODY MASS INDEX: 24.42 KG/M2 | HEART RATE: 80 BPM | HEIGHT: 67 IN | DIASTOLIC BLOOD PRESSURE: 58 MMHG | WEIGHT: 155.6 LBS

## 2024-05-09 DIAGNOSIS — N20.0 NEPHROLITHIASIS: ICD-10-CM

## 2024-05-09 DIAGNOSIS — N21.0 BLADDER CALCULI: ICD-10-CM

## 2024-05-09 DIAGNOSIS — N20.0 NEPHROLITHIASIS: Primary | ICD-10-CM

## 2024-05-09 DIAGNOSIS — N12 PYELONEPHRITIS: Primary | ICD-10-CM

## 2024-05-09 DIAGNOSIS — R78.81 BACTEREMIA: ICD-10-CM

## 2024-05-09 DIAGNOSIS — A49.9 INFECTION DUE TO EXTENDED SPECTRUM BETA LACTAMASE (ESBL) PRODUCING BACTERIA: ICD-10-CM

## 2024-05-09 DIAGNOSIS — Z16.12 INFECTION DUE TO EXTENDED SPECTRUM BETA LACTAMASE (ESBL) PRODUCING BACTERIA: ICD-10-CM

## 2024-05-09 LAB
ALBUMIN SERPL-MCNC: 3.5 G/DL (ref 3.5–5.2)
ALBUMIN/GLOB SERPL: 1 G/DL
ALP SERPL-CCNC: 90 U/L (ref 39–117)
ALT SERPL W P-5'-P-CCNC: 45 U/L (ref 1–41)
ANION GAP SERPL CALCULATED.3IONS-SCNC: 10.1 MMOL/L (ref 5–15)
AST SERPL-CCNC: 60 U/L (ref 1–40)
BASOPHILS # BLD AUTO: 0.04 10*3/MM3 (ref 0–0.2)
BASOPHILS NFR BLD AUTO: 0.4 % (ref 0–1.5)
BILIRUB SERPL-MCNC: 0.3 MG/DL (ref 0–1.2)
BUN SERPL-MCNC: 19 MG/DL (ref 8–23)
BUN/CREAT SERPL: 16.5 (ref 7–25)
CALCIUM SPEC-SCNC: 9.2 MG/DL (ref 8.6–10.5)
CHLORIDE SERPL-SCNC: 102 MMOL/L (ref 98–107)
CK SERPL-CCNC: 83 U/L (ref 20–200)
CO2 SERPL-SCNC: 25.9 MMOL/L (ref 22–29)
CREAT SERPL-MCNC: 1.15 MG/DL (ref 0.76–1.27)
DEPRECATED RDW RBC AUTO: 43.4 FL (ref 37–54)
EGFRCR SERPLBLD CKD-EPI 2021: 66.8 ML/MIN/1.73
EOSINOPHIL # BLD AUTO: 0.3 10*3/MM3 (ref 0–0.4)
EOSINOPHIL NFR BLD AUTO: 2.7 % (ref 0.3–6.2)
ERYTHROCYTE [DISTWIDTH] IN BLOOD BY AUTOMATED COUNT: 13.8 % (ref 12.3–15.4)
GLOBULIN UR ELPH-MCNC: 3.4 GM/DL
GLUCOSE SERPL-MCNC: 115 MG/DL (ref 65–99)
HCT VFR BLD AUTO: 30.4 % (ref 37.5–51)
HGB BLD-MCNC: 9.6 G/DL (ref 13–17.7)
IMM GRANULOCYTES # BLD AUTO: 0.11 10*3/MM3 (ref 0–0.05)
IMM GRANULOCYTES NFR BLD AUTO: 1 % (ref 0–0.5)
LYMPHOCYTES # BLD AUTO: 1.91 10*3/MM3 (ref 0.7–3.1)
LYMPHOCYTES NFR BLD AUTO: 17.1 % (ref 19.6–45.3)
MCH RBC QN AUTO: 27.7 PG (ref 26.6–33)
MCHC RBC AUTO-ENTMCNC: 31.6 G/DL (ref 31.5–35.7)
MCV RBC AUTO: 87.9 FL (ref 79–97)
MONOCYTES # BLD AUTO: 0.88 10*3/MM3 (ref 0.1–0.9)
MONOCYTES NFR BLD AUTO: 7.9 % (ref 5–12)
NEUTROPHILS NFR BLD AUTO: 7.91 10*3/MM3 (ref 1.7–7)
NEUTROPHILS NFR BLD AUTO: 70.9 % (ref 42.7–76)
NRBC BLD AUTO-RTO: 0 /100 WBC (ref 0–0.2)
PLATELET # BLD AUTO: 219 10*3/MM3 (ref 140–450)
PMV BLD AUTO: 10.9 FL (ref 6–12)
POTASSIUM SERPL-SCNC: 5.4 MMOL/L (ref 3.5–5.2)
PROT SERPL-MCNC: 6.9 G/DL (ref 6–8.5)
RBC # BLD AUTO: 3.46 10*6/MM3 (ref 4.14–5.8)
SODIUM SERPL-SCNC: 138 MMOL/L (ref 136–145)
WBC NRBC COR # BLD AUTO: 11.15 10*3/MM3 (ref 3.4–10.8)

## 2024-05-09 PROCEDURE — 74018 RADEX ABDOMEN 1 VIEW: CPT | Performed by: RADIOLOGY

## 2024-05-09 PROCEDURE — 85025 COMPLETE CBC W/AUTO DIFF WBC: CPT

## 2024-05-09 PROCEDURE — 74018 RADEX ABDOMEN 1 VIEW: CPT

## 2024-05-09 PROCEDURE — 82550 ASSAY OF CK (CPK): CPT

## 2024-05-09 PROCEDURE — 80053 COMPREHEN METABOLIC PANEL: CPT

## 2024-05-12 ENCOUNTER — NURSE TRIAGE (OUTPATIENT)
Dept: CALL CENTER | Facility: HOSPITAL | Age: 74
End: 2024-05-12
Payer: MEDICARE

## 2024-05-14 ENCOUNTER — READMISSION MANAGEMENT (OUTPATIENT)
Dept: CALL CENTER | Facility: HOSPITAL | Age: 74
End: 2024-05-14
Payer: MEDICARE

## 2024-05-14 NOTE — OUTREACH NOTE
Medical Week 2 Survey      Flowsheet Row Responses   [facility] facility patient discharged from? [place]   Does the patient have one of the following disease processes/diagnoses(primary or secondary)? Other   Week 2 attempt successful? No   Unsuccessful attempts Attempt 1            Shekhar MAC - Registered Nurse

## 2024-05-21 ENCOUNTER — READMISSION MANAGEMENT (OUTPATIENT)
Dept: CALL CENTER | Facility: HOSPITAL | Age: 74
End: 2024-05-21
Payer: MEDICARE

## 2024-05-21 NOTE — OUTREACH NOTE
Medical Week 3 Survey      Flowsheet Row Responses   [facility] patient discharged from? [place]   Does the patient have one of the following disease processes/diagnoses(primary or secondary)? Other   Week 3 attempt successful? Yes   Call start time 1600   Call end time 1602   Discharge diagnosis Pyelonephritis   Meds reviewed with patient/caregiver? Yes   Is the patient taking all medications as directed (includes completed medication regime)? Yes   Does the patient have a primary care provider?  Yes   Has the patient kept scheduled appointments due by today? Yes   What is the Home health agency?  [facility]   Has home health visited the patient within 72 hours of discharge? Yes   Psychosocial issues? No   Did the patient receive a copy of their discharge instructions? Yes   Nursing interventions Reviewed instructions with patient   What is the patient's perception of their health status since discharge? Improving   Is the patient/caregiver able to teach back signs and symptoms related to disease process for when to call PCP? Yes   Is the patient/caregiver able to teach back signs and symptoms related to disease process for when to call 911? Yes   Is the patient/caregiver able to teach back the hierarchy of who to call/visit for symptoms/problems? PCP, Specialist, Home health nurse, Urgent Care, ED, 911 Yes   Week 3 Call Completed? Yes   Graduated Yes   Is the patient interested in additional calls from an ambulatory ? No   Would this patient benefit from a Referral to Amb Social Work? No   Call end time 1602            Irina Osullivan

## 2024-07-02 ENCOUNTER — TELEPHONE (OUTPATIENT)
Dept: UROLOGY | Facility: CLINIC | Age: 74
End: 2024-07-02
Payer: MEDICARE

## 2024-07-02 DIAGNOSIS — R35.1 BENIGN PROSTATIC HYPERPLASIA WITH NOCTURIA: ICD-10-CM

## 2024-07-02 DIAGNOSIS — N40.1 BENIGN PROSTATIC HYPERPLASIA WITH NOCTURIA: ICD-10-CM

## 2024-07-02 RX ORDER — MIRABEGRON 25 MG/1
25 TABLET, FILM COATED, EXTENDED RELEASE ORAL DAILY
Qty: 30 TABLET | Refills: 2 | Status: SHIPPED | OUTPATIENT
Start: 2024-07-02

## 2024-07-31 DIAGNOSIS — N20.0 NEPHROLITHIASIS: ICD-10-CM

## 2024-07-31 RX ORDER — TAMSULOSIN HYDROCHLORIDE 0.4 MG/1
1 CAPSULE ORAL DAILY
Qty: 90 CAPSULE | Refills: 3 | Status: SHIPPED | OUTPATIENT
Start: 2024-07-31

## 2024-08-08 ENCOUNTER — OFFICE VISIT (OUTPATIENT)
Dept: UROLOGY | Facility: CLINIC | Age: 74
End: 2024-08-08
Payer: MEDICARE

## 2024-08-08 ENCOUNTER — HOSPITAL ENCOUNTER (OUTPATIENT)
Dept: GENERAL RADIOLOGY | Facility: HOSPITAL | Age: 74
Discharge: HOME | End: 2024-08-08
Admitting: UROLOGY
Payer: MEDICARE

## 2024-08-08 VITALS
BODY MASS INDEX: 25.43 KG/M2 | WEIGHT: 158.2 LBS | SYSTOLIC BLOOD PRESSURE: 145 MMHG | HEART RATE: 80 BPM | HEIGHT: 66 IN | DIASTOLIC BLOOD PRESSURE: 70 MMHG

## 2024-08-08 DIAGNOSIS — N20.0 NEPHROLITHIASIS: Primary | ICD-10-CM

## 2024-08-08 DIAGNOSIS — N21.0 BLADDER CALCULI: ICD-10-CM

## 2024-08-08 PROCEDURE — 1159F MED LIST DOCD IN RCRD: CPT | Performed by: UROLOGY

## 2024-08-08 PROCEDURE — 99213 OFFICE O/P EST LOW 20 MIN: CPT | Performed by: UROLOGY

## 2024-08-08 PROCEDURE — 74018 RADEX ABDOMEN 1 VIEW: CPT

## 2024-08-08 PROCEDURE — 1160F RVW MEDS BY RX/DR IN RCRD: CPT | Performed by: UROLOGY

## 2024-08-08 NOTE — PROGRESS NOTES
Chief Complaint:      Chief Complaint   Patient presents with    Nephrolithiasis       HPI:   73 y.o. male returns today.  He had an extensive stone burden on the left with renal ureteral or bladder stone he is having no pain he had a history of some slight bleeding postop KUB is negative for urolithiasis on the left side.    Past Medical History:     Past Medical History:   Diagnosis Date    Arthritis     Asthma     Black lung disease     Diabetes mellitus     Elevated cholesterol     GERD (gastroesophageal reflux disease)     Hypertension     Kidney stone     Sleep apnea     no c-pap       Current Meds:     Current Outpatient Medications   Medication Sig Dispense Refill    albuterol sulfate  (90 Base) MCG/ACT inhaler Inhale 2 puffs Every 4 (Four) Hours As Needed for Shortness of Air.      Apoaequorin 10 MG capsule Take 1 capsule by mouth Daily.      Aspirin Low Dose 81 MG EC tablet Take 1 tablet by mouth Daily.      budesonide-formoterol (SYMBICORT) 160-4.5 MCG/ACT inhaler Inhale 2 puffs 2 (Two) Times a Day. Rinse mouth after each use      carvedilol (COREG) 3.125 MG tablet Take 1 tablet by mouth 2 (Two) Times a Day With Meals.      Cholecalciferol 25 MCG (1000 UT) tablet Take 1 tablet by mouth Daily.      finasteride (PROSCAR) 5 MG tablet Take 1 tablet by mouth Daily.      HYDROcodone-acetaminophen (NORCO)  MG per tablet Take 1 tablet by mouth Every 6 (Six) Hours As Needed for Moderate Pain (Pain). 20 tablet 0    Januvia 100 MG tablet Take 1 tablet by mouth Daily.      latanoprost (XALATAN) 0.005 % ophthalmic solution Administer 1 drop to both eyes Daily.      levocetirizine (XYZAL) 5 MG tablet Take 1 tablet by mouth every night at bedtime.      linaclotide (LINZESS) 145 MCG capsule capsule Take 1 capsule by mouth Every Morning Before Breakfast. 30 capsule 2    lisinopril (PRINIVIL,ZESTRIL) 20 MG tablet Take 1 tablet by mouth every night at bedtime.      meloxicam (MOBIC) 15 MG tablet Take 1 tablet  by mouth Daily As Needed for Moderate Pain.      Mirabegron ER (MYRBETRIQ) 25 MG tablet sustained-release 24 hour 24 hr tablet Take 1 tablet by mouth Daily. 30 tablet 2    montelukast (SINGULAIR) 10 MG tablet Take 1 tablet by mouth Every Night.      omeprazole (priLOSEC) 20 MG capsule Take 1 capsule by mouth Daily.      rosuvastatin (CRESTOR) 10 MG tablet Take 1 tablet by mouth Daily.      tamsulosin (FLOMAX) 0.4 MG capsule 24 hr capsule TAKE 1 CAPSULE BY MOUTH DAILY 90 capsule 3     No current facility-administered medications for this visit.        Allergies:      No Known Allergies     Past Surgical History:     Past Surgical History:   Procedure Laterality Date    CATARACT EXTRACTION WITH INTRAOCULAR LENS IMPLANT Bilateral     COLONOSCOPY      CYST REMOVAL Right     hip    CYSTOSCOPY LITHOLAPAXY BLADDER STONE EXTRACTION N/A 4/26/2024    Procedure: CYSTOSCOPY LITHOLAPAXY BLADDER STONE EXTRACTION;  Surgeon: Clayton Gauthier MD;  Location: [facility];  Service: Urology;  Laterality: N/A;    CYSTOSCOPY URETEROSCOPY LASER LITHOTRIPSY Left 4/26/2024    Procedure: LEFT URETEROSCOPY LASER LITHOTRIPSY;  Surgeon: Clayton Gauthier MD;  Location: [facility];  Service: Urology;  Laterality: Left;    EXTRACORPOREAL SHOCK WAVE LITHOTRIPSY (ESWL) Left 4/26/2024    Procedure: LEFT ESWL;  Surgeon: Clayton Gauthier MD;  Location: [facility];  Service: Urology;  Laterality: Left;       Social History:     Social History     Socioeconomic History    Marital status:    Tobacco Use    Smoking status: Never     Passive exposure: Never    Smokeless tobacco: Current     Types: Snuff   Vaping Use    Vaping status: Never Used   Substance and Sexual Activity    Alcohol use: Never    Drug use: Never    Sexual activity: Defer       Family History:     Family History   Problem Relation Age of Onset    Prostate cancer Father        Review of Systems:     Review of Systems   Constitutional: Negative.    HENT: Negative.      Eyes: Negative.    Respiratory: Negative.     Cardiovascular: Negative.    Gastrointestinal: Negative.    Endocrine: Negative.    Musculoskeletal: Negative.    Allergic/Immunologic: Negative.    Neurological: Negative.    Hematological: Negative.    Psychiatric/Behavioral: Negative.         Physical Exam:     Physical Exam  Vitals and nursing note reviewed.   Constitutional:       Appearance: He is well-developed.   HENT:      Head: Normocephalic and atraumatic.   Eyes:      Conjunctiva/sclera: Conjunctivae normal.      Pupils: Pupils are equal, round, and reactive to light.   Cardiovascular:      Rate and Rhythm: Normal rate and regular rhythm.      Heart sounds: Normal heart sounds.   Pulmonary:      Effort: Pulmonary effort is normal.      Breath sounds: Normal breath sounds.   Abdominal:      General: Bowel sounds are normal.      Palpations: Abdomen is soft.   Musculoskeletal:         General: Normal range of motion.      Cervical back: Normal range of motion.   Skin:     General: Skin is warm and dry.   Neurological:      Mental Status: He is alert and oriented to person, place, and time.      Deep Tendon Reflexes: Reflexes are normal and symmetric.   Psychiatric:         Behavior: Behavior normal.         Thought Content: Thought content normal.         Judgment: Judgment normal.         I have reviewed the following portions of the patient's history: Allergies, current medications, past family history, past medical history, past social history, past surgical history, problem list, and ROS and confirm it is accurate.    Recent Image (CT and/or KUB):      CT Abdomen and Pelvis: No results found for this or any previous visit.       CT Stone Protocol: Results for orders placed during the hospital encounter of 04/26/24    CT Abdomen Pelvis Stone Protocol    Narrative  EXAM:  CT Abdomen and Pelvis Without Intravenous Contrast    EXAM DATE:  4/26/2024 8:44 AM    CLINICAL  HISTORY:  Nephrolithiasis    TECHNIQUE:  Axial computed tomography images of the abdomen and pelvis without  intravenous contrast.  Sagittal and coronal reformatted images were  created and reviewed.  This CT exam was performed using one or more of  the following dose reduction techniques:  automated exposure control,  adjustment of the mA and/or kV according to patient size, and/or use of  iterative reconstruction technique.    COMPARISON:  2/21/2023    FINDINGS:  Lung bases:  Lung bases are clear.  No consolidation.  Heart:  Small pericardial effusion.    ABDOMEN:  Liver:  Unremarkable as visualized.  Gallbladder and bile ducts:  Markedly distended gallbladder with  multiple layering gallstones. No wall thickening or pericholecystic  fluid.  No ductal dilation.  Pancreas:  Unremarkable as visualized.  No ductal dilation.  Spleen:  Unremarkable as visualized.  No splenomegaly.  Adrenals:  Unremarkable as visualized.  No mass.  Kidneys and ureters:  Marked left hydronephrosis is noted to the level  of the distal left ureter UVJ region where there are multiple layering  stones, largest of which is 8.4 mm. Large left UVJ stone that is almost  within the urinary bladder is approximately 6.6 mm.  Nonobstructing  bilateral kidney stones are noted.  Stomach and bowel:  Sigmoid diverticulosis without diverticulitis.  No  obstruction.    PELVIS:  Appendix:  No findings to suggest acute appendicitis.  Bladder:  Incomplete distention of urinary bladder with mild wall  thickening.  Layering stone in the dependent portion of the urinary  bladder is approximately 10.7 mm.  Reproductive:  Prostate large with central calcifications.    ABDOMEN and PELVIS:  Intraperitoneal space:  Unremarkable as visualized.  No free air.  No  significant fluid collection.  Bones/joints:  No acute fracture.  No dislocation.  Soft tissues:  Small fat only containing umbilical hernia.  Vasculature:  Unremarkable as visualized.  No abdominal  aortic  aneurysm.  Lymph nodes:  Unremarkable as visualized.  No enlarged lymph nodes.    Impression  1.  Marked left-sided hydronephrosis and ureteral dilatation secondary  to multiple obstructing distal left ureteral stones and UVJ stone with  stone measurements detailed above.  2.  Urinary bladder stone.  3.  Bilateral nonobstructing kidney stones.  4.  Distended gallbladder with multiple layering gallstones. No wall  thickening identified.  5.  Prostate enlargement and central calcifications.  6.  Diverticulosis without diverticulitis.  7.  Other incidental/nonacute findings above.      This report was finalized on 4/26/2024 9:06 AM by Dr. Eric Stokes MD.       KUB: Results for orders placed in visit on 08/08/24    XR Abdomen KUB    Narrative  EXAMINATION: XR ABDOMEN KUB-    CLINICAL INDICATION: flank pain; N20.0-Calculus of kidney      COMPARISON: 5/9/2024    FINDINGS:  1. View of the abdomen    Large stool burden    Calcification over the right kidney    No evidence of bowel obstruction.      This report was finalized on 8/8/2024 10:51 AM by Dr. Brent Almanzar MD.       Labs (past 3 months):      Lab Requisition on 05/09/2024   Component Date Value Ref Range Status    Creatine Kinase 05/09/2024 83  20 - 200 U/L Final    Specimen hemolyzed.  Results may be affected.    Glucose 05/09/2024 115 (H)  65 - 99 mg/dL Final    BUN 05/09/2024 19  8 - 23 mg/dL Final    Creatinine 05/09/2024 1.15  0.76 - 1.27 mg/dL Final    Sodium 05/09/2024 138  136 - 145 mmol/L Final    Potassium 05/09/2024 5.4 (H)  3.5 - 5.2 mmol/L Final    Specimen hemolyzed.  Result may be falsely elevated. 1+ Hemolysis      Chloride 05/09/2024 102  98 - 107 mmol/L Final    CO2 05/09/2024 25.9  22.0 - 29.0 mmol/L Final    Calcium 05/09/2024 9.2  8.6 - 10.5 mg/dL Final    Total Protein 05/09/2024 6.9  6.0 - 8.5 g/dL Final    Albumin 05/09/2024 3.5  3.5 - 5.2 g/dL Final    ALT (SGPT) 05/09/2024 45 (H)  1 - 41 U/L Final    Specimen hemolyzed.  Result  may  be falsely elevated.    AST (SGOT) 05/09/2024 60 (H)  1 - 40 U/L Final    Alkaline Phosphatase 05/09/2024 90  39 - 117 U/L Final    Total Bilirubin 05/09/2024 0.3  0.0 - 1.2 mg/dL Final    Globulin 05/09/2024 3.4  gm/dL Final    A/G Ratio 05/09/2024 1.0  g/dL Final    BUN/Creatinine Ratio 05/09/2024 16.5  7.0 - 25.0 Final    Anion Gap 05/09/2024 10.1  5.0 - 15.0 mmol/L Final    eGFR 05/09/2024 66.8  >60.0 mL/min/1.73 Final    WBC 05/09/2024 11.15 (H)  3.40 - 10.80 10*3/mm3 Final    RBC 05/09/2024 3.46 (L)  4.14 - 5.80 10*6/mm3 Final    Hemoglobin 05/09/2024 9.6 (L)  13.0 - 17.7 g/dL Final    Hematocrit 05/09/2024 30.4 (L)  37.5 - 51.0 % Final    MCV 05/09/2024 87.9  79.0 - 97.0 fL Final    MCH 05/09/2024 27.7  26.6 - 33.0 pg Final    MCHC 05/09/2024 31.6  31.5 - 35.7 g/dL Final    RDW 05/09/2024 13.8  12.3 - 15.4 % Final    RDW-SD 05/09/2024 43.4  37.0 - 54.0 fl Final    MPV 05/09/2024 10.9  6.0 - 12.0 fL Final    Platelets 05/09/2024 219  140 - 450 10*3/mm3 Final    Neutrophil % 05/09/2024 70.9  42.7 - 76.0 % Final    Lymphocyte % 05/09/2024 17.1 (L)  19.6 - 45.3 % Final    Monocyte % 05/09/2024 7.9  5.0 - 12.0 % Final    Eosinophil % 05/09/2024 2.7  0.3 - 6.2 % Final    Basophil % 05/09/2024 0.4  0.0 - 1.5 % Final    Immature Grans % 05/09/2024 1.0 (H)  0.0 - 0.5 % Final    Neutrophils, Absolute 05/09/2024 7.91 (H)  1.70 - 7.00 10*3/mm3 Final    Lymphocytes, Absolute 05/09/2024 1.91  0.70 - 3.10 10*3/mm3 Final    Monocytes, Absolute 05/09/2024 0.88  0.10 - 0.90 10*3/mm3 Final    Eosinophils, Absolute 05/09/2024 0.30  0.00 - 0.40 10*3/mm3 Final    Basophils, Absolute 05/09/2024 0.04  0.00 - 0.20 10*3/mm3 Final    Immature Grans, Absolute 05/09/2024 0.11 (H)  0.00 - 0.05 10*3/mm3 Final    nRBC 05/09/2024 0.0  0.0 - 0.2 /100 WBC Final        Procedure:       Assessment/Plan:   Renal calculus-we discussed the presence of the stone. We discussed the various therapeutic options available including percutaneous  nephrostolithotomy, ureteroscopy and extracorporeal shockwave  lithotripsy.  We discussed the risks of lithotripsy including the passage of stones, the development of a large string of stones in the distal ureter known as Steinstrasse.  In the 3% incidence of that we will need to proceed with a ureteroscopy for obstructing fragments.  Extremely rare incidence of renal hematoma and the significance of this.  We discussed percutaneous nephrostolithotomy and its use as well as the risks and benefits such as the need for postoperative hospitalization, and the risk of damage to the kidney and the remote risk of a nephrectomy.  We also discussed the use of ureteroscopy in the upper tracts.  That this is as a decreased success rate to completely remove the stones on the first option and that very likely a stent will be required requiring an additional procedure for removal.  We discussed the absolute relative indicators for intervention including the presence of sepsis and pain we cannot control ais the primary need for urgent intervention.  We discussed placement of a stent if indicated and the management of the stent as well.  Status post a very successful lithotripsy, ureteroscopy and cystolitholopaxy today's KUB is negative for urolithiasis and he is pain-free I will see him back on an as needed basis          This document has been electronically signed by PORSHA CABAN MD August 8, 2024 11:00 EDT    Dictated Utilizing Dragon Dictation: Part of this note may be an electronic transcription/translation of spoken language to printed text using the Dragon Dictation System.

## 2024-08-13 ENCOUNTER — TELEPHONE (OUTPATIENT)
Dept: UROLOGY | Facility: CLINIC | Age: 74
End: 2024-08-13
Payer: MEDICARE

## 2024-08-13 NOTE — TELEPHONE ENCOUNTER
I called the pt and left a vm that his labs were abnormal and margarito wants him back in 6 mths instead of a year. I mailed the pt an appt reminder.

## 2024-09-19 ENCOUNTER — OFFICE VISIT (OUTPATIENT)
Dept: UROLOGY | Facility: CLINIC | Age: 74
End: 2024-09-19
Payer: MEDICARE

## 2024-09-19 VITALS
HEART RATE: 67 BPM | WEIGHT: 158 LBS | HEIGHT: 66 IN | SYSTOLIC BLOOD PRESSURE: 174 MMHG | BODY MASS INDEX: 25.39 KG/M2 | DIASTOLIC BLOOD PRESSURE: 92 MMHG

## 2024-09-19 DIAGNOSIS — K80.00 CALCULUS OF GALLBLADDER WITH ACUTE CHOLECYSTITIS WITHOUT OBSTRUCTION: Primary | ICD-10-CM

## 2024-09-19 DIAGNOSIS — N21.0 BLADDER CALCULI: ICD-10-CM

## 2024-09-19 DIAGNOSIS — N20.0 NEPHROLITHIASIS: ICD-10-CM

## 2024-09-19 DIAGNOSIS — N40.0 BENIGN PROSTATIC HYPERPLASIA WITHOUT LOWER URINARY TRACT SYMPTOMS: ICD-10-CM

## 2024-09-19 PROCEDURE — 1160F RVW MEDS BY RX/DR IN RCRD: CPT | Performed by: UROLOGY

## 2024-09-19 PROCEDURE — 99214 OFFICE O/P EST MOD 30 MIN: CPT | Performed by: UROLOGY

## 2024-09-19 PROCEDURE — 1159F MED LIST DOCD IN RCRD: CPT | Performed by: UROLOGY

## 2024-11-18 ENCOUNTER — TELEPHONE (OUTPATIENT)
Dept: UROLOGY | Facility: CLINIC | Age: 74
End: 2024-11-18

## 2024-11-18 NOTE — TELEPHONE ENCOUNTER
Caller: Jamin Julio    Relationship to patient: Self    Best call back number: [phone]    Type of visit: FOLLOW UP    Requested date:      If rescheduling, when is the original appointment: 12/16/24     Additional notes:PT RETURNING OFFICE CALL TO RESCHEDULE APPT.  UNABLE TO WARM PENALOZA.  PLEASE CALL PT

## 2025-01-07 ENCOUNTER — HOSPITAL ENCOUNTER (OUTPATIENT)
Dept: GENERAL RADIOLOGY | Facility: HOSPITAL | Age: 75
Discharge: HOME | End: 2025-01-07
Admitting: UROLOGY
Payer: MEDICARE

## 2025-01-07 ENCOUNTER — OFFICE VISIT (OUTPATIENT)
Dept: UROLOGY | Facility: CLINIC | Age: 75
End: 2025-01-07
Payer: MEDICARE

## 2025-01-07 VITALS
BODY MASS INDEX: 25.68 KG/M2 | DIASTOLIC BLOOD PRESSURE: 71 MMHG | HEART RATE: 67 BPM | SYSTOLIC BLOOD PRESSURE: 172 MMHG | HEIGHT: 66 IN | WEIGHT: 159.8 LBS

## 2025-01-07 DIAGNOSIS — N20.0 NEPHROLITHIASIS: Primary | ICD-10-CM

## 2025-01-07 DIAGNOSIS — N20.0 NEPHROLITHIASIS: ICD-10-CM

## 2025-01-07 PROCEDURE — 1159F MED LIST DOCD IN RCRD: CPT | Performed by: UROLOGY

## 2025-01-07 PROCEDURE — 99213 OFFICE O/P EST LOW 20 MIN: CPT | Performed by: UROLOGY

## 2025-01-07 PROCEDURE — 74018 RADEX ABDOMEN 1 VIEW: CPT | Performed by: RADIOLOGY

## 2025-01-07 PROCEDURE — 74018 RADEX ABDOMEN 1 VIEW: CPT

## 2025-01-07 PROCEDURE — 1160F RVW MEDS BY RX/DR IN RCRD: CPT | Performed by: UROLOGY

## 2025-01-07 NOTE — PROGRESS NOTES
Chief Complaint:      Chief Complaint   Patient presents with    Nephrolithiasis     3 month follow up         HPI:   74 y.o. male status post stone treatment.  Doing well pain-free KUB was reviewed we will see him back in 6 months with KUB    Past Medical History:     Past Medical History:   Diagnosis Date    Arthritis     Asthma     Black lung disease     Diabetes mellitus     Elevated cholesterol     GERD (gastroesophageal reflux disease)     Hypertension     Kidney stone     Sleep apnea     no c-pap       Current Meds:     Current Outpatient Medications   Medication Sig Dispense Refill    albuterol sulfate  (90 Base) MCG/ACT inhaler Inhale 2 puffs Every 4 (Four) Hours As Needed for Shortness of Air.      Apoaequorin 10 MG capsule Take 1 capsule by mouth Daily.      Aspirin Low Dose 81 MG EC tablet Take 1 tablet by mouth Daily.      budesonide-formoterol (SYMBICORT) 160-4.5 MCG/ACT inhaler Inhale 2 puffs 2 (Two) Times a Day. Rinse mouth after each use      carvedilol (COREG) 3.125 MG tablet Take 1 tablet by mouth 2 (Two) Times a Day With Meals.      Cholecalciferol 25 MCG (1000 UT) tablet Take 1 tablet by mouth Daily.      finasteride (PROSCAR) 5 MG tablet Take 1 tablet by mouth Daily.      HYDROcodone-acetaminophen (NORCO)  MG per tablet Take 1 tablet by mouth Every 6 (Six) Hours As Needed for Moderate Pain (Pain). 20 tablet 0    Januvia 100 MG tablet Take 1 tablet by mouth Daily.      latanoprost (XALATAN) 0.005 % ophthalmic solution Administer 1 drop to both eyes Daily.      levocetirizine (XYZAL) 5 MG tablet Take 1 tablet by mouth every night at bedtime.      linaclotide (LINZESS) 145 MCG capsule capsule Take 1 capsule by mouth Every Morning Before Breakfast. 30 capsule 2    lisinopril (PRINIVIL,ZESTRIL) 20 MG tablet Take 1 tablet by mouth every night at bedtime.      meloxicam (MOBIC) 15 MG tablet Take 1 tablet by mouth Daily As Needed for Moderate Pain.      Mirabegron ER (MYRBETRIQ) 25 MG  tablet sustained-release 24 hour 24 hr tablet Take 1 tablet by mouth Daily. 30 tablet 2    montelukast (SINGULAIR) 10 MG tablet Take 1 tablet by mouth Every Night.      omeprazole (priLOSEC) 20 MG capsule Take 1 capsule by mouth Daily.      rosuvastatin (CRESTOR) 10 MG tablet Take 1 tablet by mouth Daily.      tamsulosin (FLOMAX) 0.4 MG capsule 24 hr capsule TAKE 1 CAPSULE BY MOUTH DAILY 90 capsule 3     No current facility-administered medications for this visit.        Allergies:      No Known Allergies     Past Surgical History:     Past Surgical History:   Procedure Laterality Date    CATARACT EXTRACTION WITH INTRAOCULAR LENS IMPLANT Bilateral     COLONOSCOPY      CYST REMOVAL Right     hip    CYSTOSCOPY LITHOLAPAXY BLADDER STONE EXTRACTION N/A 4/26/2024    Procedure: CYSTOSCOPY LITHOLAPAXY BLADDER STONE EXTRACTION;  Surgeon: Clayton Gauthier MD;  Location: [facility];  Service: Urology;  Laterality: N/A;    CYSTOSCOPY URETEROSCOPY LASER LITHOTRIPSY Left 4/26/2024    Procedure: LEFT URETEROSCOPY LASER LITHOTRIPSY;  Surgeon: Clayton Gauthier MD;  Location: [facility];  Service: Urology;  Laterality: Left;    EXTRACORPOREAL SHOCK WAVE LITHOTRIPSY (ESWL) Left 4/26/2024    Procedure: LEFT ESWL;  Surgeon: Clayton Gauthier MD;  Location: [facility];  Service: Urology;  Laterality: Left;       Social History:     Social History     Socioeconomic History    Marital status:    Tobacco Use    Smoking status: Never     Passive exposure: Never    Smokeless tobacco: Current     Types: Snuff   Vaping Use    Vaping status: Never Used   Substance and Sexual Activity    Alcohol use: Never    Drug use: Never    Sexual activity: Defer       Family History:     Family History   Problem Relation Age of Onset    Prostate cancer Father        Review of Systems:     Review of Systems   Constitutional: Negative.    HENT: Negative.     Eyes: Negative.    Respiratory: Negative.     Cardiovascular: Negative.     Gastrointestinal: Negative.    Endocrine: Negative.    Musculoskeletal: Negative.    Allergic/Immunologic: Negative.    Neurological: Negative.    Hematological: Negative.    Psychiatric/Behavioral: Negative.         Physical Exam:     Physical Exam  Vitals and nursing note reviewed.   Constitutional:       Appearance: He is well-developed.   HENT:      Head: Normocephalic and atraumatic.   Eyes:      Conjunctiva/sclera: Conjunctivae normal.      Pupils: Pupils are equal, round, and reactive to light.   Cardiovascular:      Rate and Rhythm: Normal rate and regular rhythm.      Heart sounds: Normal heart sounds.   Pulmonary:      Effort: Pulmonary effort is normal.      Breath sounds: Normal breath sounds.   Abdominal:      General: Bowel sounds are normal.      Palpations: Abdomen is soft.   Musculoskeletal:         General: Normal range of motion.      Cervical back: Normal range of motion.   Skin:     General: Skin is warm and dry.   Neurological:      Mental Status: He is alert and oriented to person, place, and time.      Deep Tendon Reflexes: Reflexes are normal and symmetric.   Psychiatric:         Behavior: Behavior normal.         Thought Content: Thought content normal.         Judgment: Judgment normal.         I have reviewed the following portions of the patient's history: Allergies, current medications, past family history, past medical history, past social history, past surgical history, problem list, and ROS and confirm it is accurate.    Recent Image (CT and/or KUB):      CT Abdomen and Pelvis: No results found for this or any previous visit.       CT Stone Protocol: Results for orders placed during the hospital encounter of 04/26/24    CT Abdomen Pelvis Stone Protocol    Narrative  EXAM:  CT Abdomen and Pelvis Without Intravenous Contrast    EXAM DATE:  4/26/2024 8:44 AM    CLINICAL HISTORY:  Nephrolithiasis    TECHNIQUE:  Axial computed tomography images of the abdomen and pelvis  without  intravenous contrast.  Sagittal and coronal reformatted images were  created and reviewed.  This CT exam was performed using one or more of  the following dose reduction techniques:  automated exposure control,  adjustment of the mA and/or kV according to patient size, and/or use of  iterative reconstruction technique.    COMPARISON:  2/21/2023    FINDINGS:  Lung bases:  Lung bases are clear.  No consolidation.  Heart:  Small pericardial effusion.    ABDOMEN:  Liver:  Unremarkable as visualized.  Gallbladder and bile ducts:  Markedly distended gallbladder with  multiple layering gallstones. No wall thickening or pericholecystic  fluid.  No ductal dilation.  Pancreas:  Unremarkable as visualized.  No ductal dilation.  Spleen:  Unremarkable as visualized.  No splenomegaly.  Adrenals:  Unremarkable as visualized.  No mass.  Kidneys and ureters:  Marked left hydronephrosis is noted to the level  of the distal left ureter UVJ region where there are multiple layering  stones, largest of which is 8.4 mm. Large left UVJ stone that is almost  within the urinary bladder is approximately 6.6 mm.  Nonobstructing  bilateral kidney stones are noted.  Stomach and bowel:  Sigmoid diverticulosis without diverticulitis.  No  obstruction.    PELVIS:  Appendix:  No findings to suggest acute appendicitis.  Bladder:  Incomplete distention of urinary bladder with mild wall  thickening.  Layering stone in the dependent portion of the urinary  bladder is approximately 10.7 mm.  Reproductive:  Prostate large with central calcifications.    ABDOMEN and PELVIS:  Intraperitoneal space:  Unremarkable as visualized.  No free air.  No  significant fluid collection.  Bones/joints:  No acute fracture.  No dislocation.  Soft tissues:  Small fat only containing umbilical hernia.  Vasculature:  Unremarkable as visualized.  No abdominal aortic  aneurysm.  Lymph nodes:  Unremarkable as visualized.  No enlarged lymph nodes.    Impression  1.   Marked left-sided hydronephrosis and ureteral dilatation secondary  to multiple obstructing distal left ureteral stones and UVJ stone with  stone measurements detailed above.  2.  Urinary bladder stone.  3.  Bilateral nonobstructing kidney stones.  4.  Distended gallbladder with multiple layering gallstones. No wall  thickening identified.  5.  Prostate enlargement and central calcifications.  6.  Diverticulosis without diverticulitis.  7.  Other incidental/nonacute findings above.      This report was finalized on 4/26/2024 9:06 AM by Dr. Eric Stokes MD.       KUB: Results for orders placed in visit on 08/08/24    XR Abdomen KUB    Narrative  EXAMINATION: XR ABDOMEN KUB-    CLINICAL INDICATION: flank pain; N20.0-Calculus of kidney      COMPARISON: 5/9/2024    FINDINGS:  1. View of the abdomen    Large stool burden    Calcification over the right kidney    No evidence of bowel obstruction.      This report was finalized on 8/8/2024 10:51 AM by Dr. Brent Almanzar MD.       Labs (past 3 months):      No visits with results within 3 Month(s) from this visit.   Latest known visit with results is:   Lab Requisition on 05/09/2024   Component Date Value Ref Range Status    Creatine Kinase 05/09/2024 83  20 - 200 U/L Final    Specimen hemolyzed.  Results may be affected.    Glucose 05/09/2024 115 (H)  65 - 99 mg/dL Final    BUN 05/09/2024 19  8 - 23 mg/dL Final    Creatinine 05/09/2024 1.15  0.76 - 1.27 mg/dL Final    Sodium 05/09/2024 138  136 - 145 mmol/L Final    Potassium 05/09/2024 5.4 (H)  3.5 - 5.2 mmol/L Final    Specimen hemolyzed.  Result may be falsely elevated. 1+ Hemolysis      Chloride 05/09/2024 102  98 - 107 mmol/L Final    CO2 05/09/2024 25.9  22.0 - 29.0 mmol/L Final    Calcium 05/09/2024 9.2  8.6 - 10.5 mg/dL Final    Total Protein 05/09/2024 6.9  6.0 - 8.5 g/dL Final    Albumin 05/09/2024 3.5  3.5 - 5.2 g/dL Final    ALT (SGPT) 05/09/2024 45 (H)  1 - 41 U/L Final    Specimen hemolyzed.  Result may  be  falsely elevated.    AST (SGOT) 05/09/2024 60 (H)  1 - 40 U/L Final    Alkaline Phosphatase 05/09/2024 90  39 - 117 U/L Final    Total Bilirubin 05/09/2024 0.3  0.0 - 1.2 mg/dL Final    Globulin 05/09/2024 3.4  gm/dL Final    A/G Ratio 05/09/2024 1.0  g/dL Final    BUN/Creatinine Ratio 05/09/2024 16.5  7.0 - 25.0 Final    Anion Gap 05/09/2024 10.1  5.0 - 15.0 mmol/L Final    eGFR 05/09/2024 66.8  >60.0 mL/min/1.73 Final    WBC 05/09/2024 11.15 (H)  3.40 - 10.80 10*3/mm3 Final    RBC 05/09/2024 3.46 (L)  4.14 - 5.80 10*6/mm3 Final    Hemoglobin 05/09/2024 9.6 (L)  13.0 - 17.7 g/dL Final    Hematocrit 05/09/2024 30.4 (L)  37.5 - 51.0 % Final    MCV 05/09/2024 87.9  79.0 - 97.0 fL Final    MCH 05/09/2024 27.7  26.6 - 33.0 pg Final    MCHC 05/09/2024 31.6  31.5 - 35.7 g/dL Final    RDW 05/09/2024 13.8  12.3 - 15.4 % Final    RDW-SD 05/09/2024 43.4  37.0 - 54.0 fl Final    MPV 05/09/2024 10.9  6.0 - 12.0 fL Final    Platelets 05/09/2024 219  140 - 450 10*3/mm3 Final    Neutrophil % 05/09/2024 70.9  42.7 - 76.0 % Final    Lymphocyte % 05/09/2024 17.1 (L)  19.6 - 45.3 % Final    Monocyte % 05/09/2024 7.9  5.0 - 12.0 % Final    Eosinophil % 05/09/2024 2.7  0.3 - 6.2 % Final    Basophil % 05/09/2024 0.4  0.0 - 1.5 % Final    Immature Grans % 05/09/2024 1.0 (H)  0.0 - 0.5 % Final    Neutrophils, Absolute 05/09/2024 7.91 (H)  1.70 - 7.00 10*3/mm3 Final    Lymphocytes, Absolute 05/09/2024 1.91  0.70 - 3.10 10*3/mm3 Final    Monocytes, Absolute 05/09/2024 0.88  0.10 - 0.90 10*3/mm3 Final    Eosinophils, Absolute 05/09/2024 0.30  0.00 - 0.40 10*3/mm3 Final    Basophils, Absolute 05/09/2024 0.04  0.00 - 0.20 10*3/mm3 Final    Immature Grans, Absolute 05/09/2024 0.11 (H)  0.00 - 0.05 10*3/mm3 Final    nRBC 05/09/2024 0.0  0.0 - 0.2 /100 WBC Final        Procedure:       Assessment/Plan:   Renal calculus-we discussed the presence of the stone. We discussed the various therapeutic options available including percutaneous  nephrostolithotomy, ureteroscopy and extracorporeal shockwave  lithotripsy.  We discussed the risks of lithotripsy including the passage of stones, the development of a large string of stones in the distal ureter known as Steinstrasse.  In the 3% incidence of that we will need to proceed with a ureteroscopy for obstructing fragments.  Extremely rare incidence of renal hematoma and the significance of this.  We discussed percutaneous nephrostolithotomy and its use as well as the risks and benefits such as the need for postoperative hospitalization, and the risk of damage to the kidney and the remote risk of a nephrectomy.  We also discussed the use of ureteroscopy in the upper tracts.  That this is as a decreased success rate to completely remove the stones on the first option and that very likely a stent will be required requiring an additional procedure for removal.  We discussed the absolute relative indicators for intervention including the presence of sepsis and pain we cannot control ais the primary need for urgent intervention.  We discussed placement of a stent if indicated and the management of the stent as well.            This document has been electronically signed by PORSHA CABAN MD January 7, 2025 13:47 EST    Dictated Utilizing Dragon Dictation: Part of this note may be an electronic transcription/translation of spoken language to printed text using the Dragon Dictation System.

## 2025-07-07 ENCOUNTER — HOSPITAL ENCOUNTER (OUTPATIENT)
Dept: GENERAL RADIOLOGY | Facility: HOSPITAL | Age: 75
Discharge: HOME | End: 2025-07-07
Payer: MEDICARE

## 2025-07-07 DIAGNOSIS — N21.0 BLADDER CALCULI: ICD-10-CM

## 2025-07-07 DIAGNOSIS — N21.0 BLADDER CALCULI: Primary | ICD-10-CM

## 2025-07-07 PROCEDURE — 74018 RADEX ABDOMEN 1 VIEW: CPT | Performed by: RADIOLOGY

## 2025-07-07 PROCEDURE — 74018 RADEX ABDOMEN 1 VIEW: CPT

## 2025-07-25 ENCOUNTER — OFFICE VISIT (OUTPATIENT)
Dept: UROLOGY | Facility: CLINIC | Age: 75
End: 2025-07-25
Payer: MEDICARE

## 2025-07-25 VITALS — HEIGHT: 66 IN | BODY MASS INDEX: 25.43 KG/M2 | WEIGHT: 158.2 LBS

## 2025-07-25 DIAGNOSIS — R97.20 ELEVATED PROSTATE SPECIFIC ANTIGEN (PSA): Primary | ICD-10-CM

## 2025-07-25 DIAGNOSIS — N20.0 NEPHROLITHIASIS: ICD-10-CM

## 2025-07-25 NOTE — PROGRESS NOTES
"Chief Complaint:    Chief Complaint   Patient presents with    Nephrolithiasis     Follow up        Vital Signs:   Ht 167.6 cm (65.98\")   Wt 71.8 kg (158 lb 3.2 oz)   BMI 25.55 kg/m²   Body mass index is 25.55 kg/m².      HPI:  Jamin Julio is a 74 y.o. male who presents today for follow up    History of Present Illness  Mr. Julio returns to the clinic today for follow-up for nephrolithiasis and elevated PSA.  He is past medical history of bilateral kidney stones and underwent previous surgical interventions by Dr. Gauthier with last procedure in April 2024.  He has passed several small kidney stones on his own since.  He had followed along with me for an elevated PSA as high as 6.3 in April 2024.  Patient had repeat PSA in January 2024 that was still 6.2 with a 14% free percentage.  We did proceed forward with an MRI of the prostate with and without contrast that showed an 8.3 mm right transitional zone lesion rated as a PI-RADS 4 and concerning for clinically significant prostate carcinoma.  Patient did not wish to undergo any further workup or biopsy.  He returns today for 6-month follow-up for nephrolithiasis and had a KUB completed in January that showed bilateral nonobstructing stones.  There are multiple calcifications in the pelvis consistent with previous phleboliths.  Does have history of ESBL E. coli.  He denies any current lower urinary tract symptoms other than some mild urgency and weak stream.  He gets up 2 times throughout the night.  He is overall pleased with symptoms.  He is not currently taking any medications.  He did have a repeat KUB prior to office visit today that revealed stable kidney stones.      Past Medical History:  Past Medical History:   Diagnosis Date    Arthritis     Asthma     Black lung disease     Diabetes mellitus     Elevated cholesterol     GERD (gastroesophageal reflux disease)     Hypertension     Kidney stone     Sleep apnea     no c-pap       Current Meds:  Current " Outpatient Medications   Medication Sig Dispense Refill    albuterol sulfate  (90 Base) MCG/ACT inhaler Inhale 2 puffs Every 4 (Four) Hours As Needed for Shortness of Air.      Apoaequorin 10 MG capsule Take 1 capsule by mouth Daily.      Aspirin Low Dose 81 MG EC tablet Take 1 tablet by mouth Daily.      budesonide-formoterol (SYMBICORT) 160-4.5 MCG/ACT inhaler Inhale 2 puffs 2 (Two) Times a Day. Rinse mouth after each use      carvedilol (COREG) 3.125 MG tablet Take 1 tablet by mouth 2 (Two) Times a Day With Meals.      Cholecalciferol 25 MCG (1000 UT) tablet Take 1 tablet by mouth Daily.      finasteride (PROSCAR) 5 MG tablet Take 1 tablet by mouth Daily.      Januvia 100 MG tablet Take 1 tablet by mouth Daily.      latanoprost (XALATAN) 0.005 % ophthalmic solution Administer 1 drop to both eyes Daily.      levocetirizine (XYZAL) 5 MG tablet Take 1 tablet by mouth every night at bedtime.      lisinopril (PRINIVIL,ZESTRIL) 20 MG tablet Take 1 tablet by mouth every night at bedtime.      meloxicam (MOBIC) 15 MG tablet Take 1 tablet by mouth Daily As Needed for Moderate Pain.      montelukast (SINGULAIR) 10 MG tablet Take 1 tablet by mouth Every Night.      omeprazole (priLOSEC) 20 MG capsule Take 1 capsule by mouth Daily.      rosuvastatin (CRESTOR) 10 MG tablet Take 1 tablet by mouth Daily.      tamsulosin (FLOMAX) 0.4 MG capsule 24 hr capsule TAKE 1 CAPSULE BY MOUTH DAILY 90 capsule 3     No current facility-administered medications for this visit.        Allergies:   No Known Allergies     Past Surgical History:  Past Surgical History:   Procedure Laterality Date    CATARACT EXTRACTION WITH INTRAOCULAR LENS IMPLANT Bilateral     COLONOSCOPY      CYST REMOVAL Right     hip    CYSTOSCOPY LITHOLAPAXY BLADDER STONE EXTRACTION N/A 4/26/2024    Procedure: CYSTOSCOPY LITHOLAPAXY BLADDER STONE EXTRACTION;  Surgeon: Clayton Gauthier MD;  Location: [facility];  Service: Urology;  Laterality: N/A;     CYSTOSCOPY URETEROSCOPY LASER LITHOTRIPSY Left 4/26/2024    Procedure: LEFT URETEROSCOPY LASER LITHOTRIPSY;  Surgeon: Clayton Gauthier MD;  Location: [facility];  Service: Urology;  Laterality: Left;    EXTRACORPOREAL SHOCK WAVE LITHOTRIPSY (ESWL) Left 4/26/2024    Procedure: LEFT ESWL;  Surgeon: Clayton Gauthier MD;  Location: [facility] OR;  Service: Urology;  Laterality: Left;       Social History:  Social History     Socioeconomic History    Marital status:    Tobacco Use    Smoking status: Never     Passive exposure: Never    Smokeless tobacco: Current     Types: Snuff   Vaping Use    Vaping status: Never Used   Substance and Sexual Activity    Alcohol use: Never    Drug use: Never    Sexual activity: Defer       Family History:  Family History   Problem Relation Age of Onset    Prostate cancer Father        Review of Systems:  Review of Systems   Constitutional:  Positive for fatigue. Negative for chills and fever.   HENT:  Negative for congestion and sinus pressure.    Respiratory:  Positive for shortness of breath. Negative for chest tightness.    Cardiovascular:  Negative for chest pain.   Gastrointestinal:  Positive for constipation. Negative for abdominal pain, diarrhea, nausea and vomiting.   Genitourinary:  Positive for frequency. Negative for difficulty urinating, dysuria, flank pain, hematuria, scrotal swelling, testicular pain and urgency.   Musculoskeletal:  Positive for back pain. Negative for neck pain.   Skin:  Negative for rash.   Allergic/Immunologic: Negative for food allergies.   Neurological:  Negative for dizziness and headaches.   Hematological:  Does not bruise/bleed easily.   Psychiatric/Behavioral:  The patient is nervous/anxious.        Physical Exam:  Physical Exam  Constitutional:       General: He is not in acute distress.     Appearance: Normal appearance.   HENT:      Head: Normocephalic and atraumatic.      Nose: Nose normal.      Mouth/Throat:      Mouth: Mucous  membranes are moist.   Eyes:      Conjunctiva/sclera: Conjunctivae normal.   Cardiovascular:      Rate and Rhythm: Normal rate.      Pulses: Normal pulses.   Pulmonary:      Effort: Pulmonary effort is normal.   Abdominal:      Palpations: Abdomen is soft.   Musculoskeletal:         General: Normal range of motion.      Cervical back: Normal range of motion.   Skin:     General: Skin is warm.   Neurological:      General: No focal deficit present.      Mental Status: He is alert and oriented to person, place, and time.   Psychiatric:         Mood and Affect: Mood normal.         Behavior: Behavior normal.         Thought Content: Thought content normal.         Judgment: Judgment normal.         IPSS Questionnaire (AUA-7):  IPSS Questionnaire (AUA-7):                  IPSS Questionnaire (AUA-7):  Over the past month…    1)  Incomplete Emptying  How often have you had a sensation of not emptying your bladder?  0 - Not at all   2)  Frequency  How often have you had to urinate less than every two hours? 0 - Not at all   3)  Intermittency  How often have you found you stopped and started again several times when you urinated?  1 - Less than 1 time in 5   4) Urgency  How often have you found it difficult to postpone urination?  2 - Less than half the time   5) Weak Stream  How often have you had a weak urinary stream?  1 - Less than 1 time in 5   6) Straining  How often have you had to push or strain to begin urination?  0 - Not at all   7) Nocturia  How many times did you typically get up at night to urinate?  2 - 2 times   Total Score:  6   The International Prostate Symptom Score (IPSS) is used to screen, diagnose, track symptoms of benign prostatic hyperplasia (BPH).    0-7 pts (Mild Symptoms)  / 8-19 pts (Moderate) / 20-35 (Severe)    Quality of life due to urinary symptoms:  If you were to spend the rest of your life with your urinary condition the way it is now, how would you feel about that? 2-Mostly Satisfied    Urine Leakage (Incontinence) 1-Mild (A few drops a day, no pad use)       Recent Image (CT and/or KUB):   CT Abdomen and Pelvis: No results found for this or any previous visit.     CT Stone Protocol: Results for orders placed during the hospital encounter of 04/26/24    CT Abdomen Pelvis Stone Protocol    Narrative  EXAM:  CT Abdomen and Pelvis Without Intravenous Contrast    EXAM DATE:  4/26/2024 8:44 AM    CLINICAL HISTORY:  Nephrolithiasis    TECHNIQUE:  Axial computed tomography images of the abdomen and pelvis without  intravenous contrast.  Sagittal and coronal reformatted images were  created and reviewed.  This CT exam was performed using one or more of  the following dose reduction techniques:  automated exposure control,  adjustment of the mA and/or kV according to patient size, and/or use of  iterative reconstruction technique.    COMPARISON:  2/21/2023    FINDINGS:  Lung bases:  Lung bases are clear.  No consolidation.  Heart:  Small pericardial effusion.    ABDOMEN:  Liver:  Unremarkable as visualized.  Gallbladder and bile ducts:  Markedly distended gallbladder with  multiple layering gallstones. No wall thickening or pericholecystic  fluid.  No ductal dilation.  Pancreas:  Unremarkable as visualized.  No ductal dilation.  Spleen:  Unremarkable as visualized.  No splenomegaly.  Adrenals:  Unremarkable as visualized.  No mass.  Kidneys and ureters:  Marked left hydronephrosis is noted to the level  of the distal left ureter UVJ region where there are multiple layering  stones, largest of which is 8.4 mm. Large left UVJ stone that is almost  within the urinary bladder is approximately 6.6 mm.  Nonobstructing  bilateral kidney stones are noted.  Stomach and bowel:  Sigmoid diverticulosis without diverticulitis.  No  obstruction.    PELVIS:  Appendix:  No findings to suggest acute appendicitis.  Bladder:  Incomplete distention of urinary bladder with mild wall  thickening.  Layering stone in the  dependent portion of the urinary  bladder is approximately 10.7 mm.  Reproductive:  Prostate large with central calcifications.    ABDOMEN and PELVIS:  Intraperitoneal space:  Unremarkable as visualized.  No free air.  No  significant fluid collection.  Bones/joints:  No acute fracture.  No dislocation.  Soft tissues:  Small fat only containing umbilical hernia.  Vasculature:  Unremarkable as visualized.  No abdominal aortic  aneurysm.  Lymph nodes:  Unremarkable as visualized.  No enlarged lymph nodes.    Impression  1.  Marked left-sided hydronephrosis and ureteral dilatation secondary  to multiple obstructing distal left ureteral stones and UVJ stone with  stone measurements detailed above.  2.  Urinary bladder stone.  3.  Bilateral nonobstructing kidney stones.  4.  Distended gallbladder with multiple layering gallstones. No wall  thickening identified.  5.  Prostate enlargement and central calcifications.  6.  Diverticulosis without diverticulitis.  7.  Other incidental/nonacute findings above.      This report was finalized on 4/26/2024 9:06 AM by Dr. Eric Stokes MD.     KUB: Results for orders placed during the hospital encounter of 07/07/25    XR Abdomen KUB    Narrative  EXAM:  XR Abdomen, 1 View    EXAM DATE:  7/7/2025 10:57 AM    CLINICAL HISTORY:  KIDNEY STONE; N21.0-Calculus in bladder    TECHNIQUE:  Frontal supine view of the abdomen/pelvis.    COMPARISON:  No relevant prior studies available.    FINDINGS:  Gastrointestinal tract:  Fairly marked colonic stool.  No dilation.  Organs:  Calcification right kidney measuring about 6 mm. Pelvic  calcifications again noted.  Bones/joints:  Unremarkable as visualized.  No acute fracture.    Impression  1.  Fairly marked colonic stool.  2.  Calcification right kidney measuring about 6 mm. Pelvic  calcifications again noted.    This report was finalized on 7/7/2025 10:57 AM by Dr. Fernando Wise MD.       Labs:  Brief Urine Lab Results       None          No  visits with results within 3 Month(s) from this visit.   Latest known visit with results is:   Lab Requisition on 05/09/2024   Component Date Value Ref Range Status    Creatine Kinase 05/09/2024 83  20 - 200 U/L Final    Specimen hemolyzed.  Results may be affected.    Glucose 05/09/2024 115 (H)  65 - 99 mg/dL Final    BUN 05/09/2024 19  8 - 23 mg/dL Final    Creatinine 05/09/2024 1.15  0.76 - 1.27 mg/dL Final    Sodium 05/09/2024 138  136 - 145 mmol/L Final    Potassium 05/09/2024 5.4 (H)  3.5 - 5.2 mmol/L Final    Specimen hemolyzed.  Result may be falsely elevated. 1+ Hemolysis      Chloride 05/09/2024 102  98 - 107 mmol/L Final    CO2 05/09/2024 25.9  22.0 - 29.0 mmol/L Final    Calcium 05/09/2024 9.2  8.6 - 10.5 mg/dL Final    Total Protein 05/09/2024 6.9  6.0 - 8.5 g/dL Final    Albumin 05/09/2024 3.5  3.5 - 5.2 g/dL Final    ALT (SGPT) 05/09/2024 45 (H)  1 - 41 U/L Final    Specimen hemolyzed.  Result may  be falsely elevated.    AST (SGOT) 05/09/2024 60 (H)  1 - 40 U/L Final    Alkaline Phosphatase 05/09/2024 90  39 - 117 U/L Final    Total Bilirubin 05/09/2024 0.3  0.0 - 1.2 mg/dL Final    Globulin 05/09/2024 3.4  gm/dL Final    A/G Ratio 05/09/2024 1.0  g/dL Final    BUN/Creatinine Ratio 05/09/2024 16.5  7.0 - 25.0 Final    Anion Gap 05/09/2024 10.1  5.0 - 15.0 mmol/L Final    eGFR 05/09/2024 66.8  >60.0 mL/min/1.73 Final    WBC 05/09/2024 11.15 (H)  3.40 - 10.80 10*3/mm3 Final    RBC 05/09/2024 3.46 (L)  4.14 - 5.80 10*6/mm3 Final    Hemoglobin 05/09/2024 9.6 (L)  13.0 - 17.7 g/dL Final    Hematocrit 05/09/2024 30.4 (L)  37.5 - 51.0 % Final    MCV 05/09/2024 87.9  79.0 - 97.0 fL Final    MCH 05/09/2024 27.7  26.6 - 33.0 pg Final    MCHC 05/09/2024 31.6  31.5 - 35.7 g/dL Final    RDW 05/09/2024 13.8  12.3 - 15.4 % Final    RDW-SD 05/09/2024 43.4  37.0 - 54.0 fl Final    MPV 05/09/2024 10.9  6.0 - 12.0 fL Final    Platelets 05/09/2024 219  140 - 450 10*3/mm3 Final    Neutrophil % 05/09/2024 70.9  42.7 -  76.0 % Final    Lymphocyte % 05/09/2024 17.1 (L)  19.6 - 45.3 % Final    Monocyte % 05/09/2024 7.9  5.0 - 12.0 % Final    Eosinophil % 05/09/2024 2.7  0.3 - 6.2 % Final    Basophil % 05/09/2024 0.4  0.0 - 1.5 % Final    Immature Grans % 05/09/2024 1.0 (H)  0.0 - 0.5 % Final    Neutrophils, Absolute 05/09/2024 7.91 (H)  1.70 - 7.00 10*3/mm3 Final    Lymphocytes, Absolute 05/09/2024 1.91  0.70 - 3.10 10*3/mm3 Final    Monocytes, Absolute 05/09/2024 0.88  0.10 - 0.90 10*3/mm3 Final    Eosinophils, Absolute 05/09/2024 0.30  0.00 - 0.40 10*3/mm3 Final    Basophils, Absolute 05/09/2024 0.04  0.00 - 0.20 10*3/mm3 Final    Immature Grans, Absolute 05/09/2024 0.11 (H)  0.00 - 0.05 10*3/mm3 Final    nRBC 05/09/2024 0.0  0.0 - 0.2 /100 WBC Final        Procedure: None  Procedures     I have reviewed and agree with the above PMH, PSH, FMH, social history, medications, allergies, and labs.     Assessment/Plan:   Problem List Items Addressed This Visit       Nephrolithiasis    Elevated prostate specific antigen (PSA) - Primary    Relevant Orders    PSA Total+% Free (Serial)       Health Maintenance:   Health Maintenance Due   Topic Date Due    DIABETIC FOOT EXAM  Never done    URINE MICROALBUMIN-CREATININE RATIO (uACR)  Never done    Pneumococcal Vaccine 50+ (1 of 2 - PCV) Never done    COLORECTAL CANCER SCREENING  Never done    ZOSTER VACCINE (1 of 2) Never done    TDAP/TD VACCINES (2 - Tdap) 10/12/2009    HEPATITIS C SCREENING  Never done    COVID-19 Vaccine (1 - 2024-25 season) Never done    RSV Vaccine - Adults (1 - 1-dose 75+ series) Never done    HEMOGLOBIN A1C  10/27/2024    DIABETIC EYE EXAM  01/23/2025    ANNUAL WELLNESS VISIT  02/28/2025        Smoking Counseling: Never smoked.  Current user of smokeless tobacco.  Counseling given.    Urine Incontinence: Patient reports that he is not currently experiencing any symptoms of urinary incontinence.    Patient was given instructions and counseling regarding his condition or  for health maintenance advice. Please see specific information pulled into the AVS if appropriate.    Patient Education:   Elevated PSA -discussed with the patient the pathophysiology of this condition in detail.  He does have a PI-RADS 4 lesion on MRI but does not wish to seek any further treatment.  Did discuss delayed risk of diagnosis for prostate carcinoma large could lead to metastatic disease in death.  Did discuss use of further workup with biopsy however again patient declined.  Will repeat a PSA in office today to monitor any significant increase or rise.  Did discuss further workup with CT scan abdomen pelvis and nuclear medicine bone scan for metastatic disease.  Patient verbalized understanding.  Nephrolithiasis -patient's KUB is stable and he denies any worsening symptoms.  Will proceed forth observation and repeat KUB in 6 months.  Did advise him to return to the clinic or go to the nearest ER if he has any fever, chills, severe pain, nausea or vomiting uncontrolled with medications, gross hematuria, or inability urinate.  Otherwise we will place him back in 6 months.  He verbalized understanding.    Visit Diagnoses:    ICD-10-CM ICD-9-CM   1. Elevated prostate specific antigen (PSA)  R97.20 790.93   2. Nephrolithiasis  N20.0 592.0     A total of 25 minutes were spent coordinating this patient’s care in clinic today; 15 minutes of which were face-to-face with the patient, reviewing medical history and counseling on the current treatment and followup plan.  All questions were answered to patient's satisfaction.    Meds Ordered During Visit:  No orders of the defined types were placed in this encounter.      Follow Up Appointment: 6 months  No follow-ups on file.        This document has been electronically signed by Ag Bautista PA-C   July 25, 2025 18:07 EDT    Part of this note may be an electronic transcription/translation of spoken language to printed text using the Dragon Dictation System.

## 2025-07-28 ENCOUNTER — RESULTS FOLLOW-UP (OUTPATIENT)
Dept: UROLOGY | Facility: CLINIC | Age: 75
End: 2025-07-28
Payer: MEDICARE

## 2025-07-28 LAB
PSA FREE MFR SERPL: 14.1 %
PSA FREE SERPL-MCNC: 0.93 NG/ML
PSA SERPL-MCNC: 6.6 NG/ML (ref 0–4)

## 2025-07-28 NOTE — TELEPHONE ENCOUNTER
Called and advise PSA was stable and would recommend close observation or further workup.  He wishes to continue with close observation at this time and to discuss risks.  Will allow him to keep his follow-up in 6 months

## (undated) DEVICE — EVAC BLDR UROVAC W ADAPT

## (undated) DEVICE — FLEXIVA  PULSE  AND  FLEXIVA  PULSE  TRACTIP  LASER  FIBERS  ARE  HIGH  POWER  SINGLE-USE FIBER: Brand: FLEXIVA PULSE

## (undated) DEVICE — FIBR LASR FLEXIVAPULSE365 HOLMIUM FLT/TP 1P/U

## (undated) DEVICE — COR CYSTO: Brand: MEDLINE INDUSTRIES, INC.

## (undated) DEVICE — GW ZIPWIRE STD/SHFT STR TPR/3CM .035IN 150CM

## (undated) DEVICE — GLV SURG PREMIERPRO MIC LTX PF SZ8 BRN

## (undated) DEVICE — Y-TYPE TUR/BLADDER IRRIGATION SET, REGULATING CLAMP

## (undated) DEVICE — NITINOL STONE RETRIEVAL BASKET: Brand: ZERO TIP